# Patient Record
Sex: MALE | Race: BLACK OR AFRICAN AMERICAN | Employment: OTHER | URBAN - METROPOLITAN AREA
[De-identification: names, ages, dates, MRNs, and addresses within clinical notes are randomized per-mention and may not be internally consistent; named-entity substitution may affect disease eponyms.]

---

## 2020-04-27 ENCOUNTER — HOSPITAL ENCOUNTER (OUTPATIENT)
Age: 55
Setting detail: OBSERVATION
Discharge: HOME | End: 2020-05-01
Attending: EMERGENCY MEDICINE | Admitting: FAMILY MEDICINE
Payer: MEDICAID

## 2020-04-27 DIAGNOSIS — Z95.828 S/P INSERTION OF ILIAC ARTERY STENT: ICD-10-CM

## 2020-04-27 DIAGNOSIS — Z79.01 LONG TERM CURRENT USE OF ANTICOAGULANT: ICD-10-CM

## 2020-04-27 DIAGNOSIS — N18.3 ACUTE RENAL FAILURE SUPERIMPOSED ON STAGE 3 CHRONIC KIDNEY DISEASE, UNSPECIFIED ACUTE RENAL FAILURE TYPE: Primary | ICD-10-CM

## 2020-04-27 DIAGNOSIS — N17.9 ACUTE RENAL FAILURE SUPERIMPOSED ON STAGE 3 CHRONIC KIDNEY DISEASE, UNSPECIFIED ACUTE RENAL FAILURE TYPE: Primary | ICD-10-CM

## 2020-04-27 DIAGNOSIS — I10 ESSENTIAL HYPERTENSION: ICD-10-CM

## 2020-04-27 PROCEDURE — 85379 FIBRIN DEGRADATION QUANT: CPT

## 2020-04-27 PROCEDURE — 82550 ASSAY OF CK (CPK): CPT

## 2020-04-27 PROCEDURE — 93005 ELECTROCARDIOGRAM TRACING: CPT

## 2020-04-27 PROCEDURE — 80053 COMPREHEN METABOLIC PANEL: CPT

## 2020-04-27 PROCEDURE — 85025 COMPLETE CBC W/AUTO DIFF WBC: CPT

## 2020-04-27 PROCEDURE — 99284 EMERGENCY DEPT VISIT MOD MDM: CPT

## 2020-04-27 PROCEDURE — 85730 THROMBOPLASTIN TIME PARTIAL: CPT

## 2020-04-28 ENCOUNTER — APPOINTMENT (OUTPATIENT)
Dept: GENERAL RADIOLOGY | Age: 55
End: 2020-04-28
Attending: EMERGENCY MEDICINE
Payer: MEDICAID

## 2020-04-28 ENCOUNTER — APPOINTMENT (OUTPATIENT)
Dept: NUCLEAR MEDICINE | Age: 55
End: 2020-04-28
Attending: FAMILY MEDICINE
Payer: MEDICAID

## 2020-04-28 ENCOUNTER — APPOINTMENT (OUTPATIENT)
Dept: VASCULAR SURGERY | Age: 55
End: 2020-04-28
Attending: FAMILY MEDICINE
Payer: MEDICAID

## 2020-04-28 ENCOUNTER — APPOINTMENT (OUTPATIENT)
Dept: NON INVASIVE DIAGNOSTICS | Age: 55
End: 2020-04-28
Attending: FAMILY MEDICINE
Payer: MEDICAID

## 2020-04-28 PROBLEM — N18.9 ACUTE ON CHRONIC RENAL FAILURE (HCC): Status: ACTIVE | Noted: 2020-04-28

## 2020-04-28 PROBLEM — N17.9 ACUTE ON CHRONIC RENAL FAILURE (HCC): Status: ACTIVE | Noted: 2020-04-28

## 2020-04-28 LAB
ABDOMINAL PROX AORTA VEL: 77.8 CM/S
ALBUMIN SERPL-MCNC: 2.2 G/DL (ref 3.4–5)
ALBUMIN/GLOB SERPL: 0.5 {RATIO} (ref 0.8–1.7)
ALP SERPL-CCNC: 87 U/L (ref 45–117)
ALT SERPL-CCNC: 45 U/L (ref 16–61)
ANION GAP SERPL CALC-SCNC: 7 MMOL/L (ref 3–18)
ANION GAP SERPL CALC-SCNC: 8 MMOL/L (ref 3–18)
APPEARANCE UR: CLEAR
APTT PPP: 119 SEC (ref 23–36.4)
APTT PPP: 31.1 SEC (ref 23–36.4)
APTT PPP: 80.8 SEC (ref 23–36.4)
AST SERPL-CCNC: 32 U/L (ref 10–38)
ATRIAL RATE: 73 BPM
AV VELOCITY RATIO: 0.82
AV VTI RATIO: 0.9
BACTERIA URNS QL MICRO: ABNORMAL /HPF
BASOPHILS # BLD: 0 K/UL (ref 0–0.1)
BASOPHILS # BLD: 0 K/UL (ref 0–0.1)
BASOPHILS NFR BLD: 0 % (ref 0–2)
BASOPHILS NFR BLD: 1 % (ref 0–2)
BILIRUB SERPL-MCNC: 0.3 MG/DL (ref 0.2–1)
BILIRUB UR QL: NEGATIVE
BUN SERPL-MCNC: 56 MG/DL (ref 7–18)
BUN SERPL-MCNC: 56 MG/DL (ref 7–18)
BUN/CREAT SERPL: 13 (ref 12–20)
BUN/CREAT SERPL: 13 (ref 12–20)
CA-I SERPL-SCNC: 1.13 MMOL/L (ref 1.12–1.32)
CALCIUM SERPL-MCNC: 7.7 MG/DL (ref 8.5–10.1)
CALCIUM SERPL-MCNC: 8.2 MG/DL (ref 8.5–10.1)
CALCULATED P AXIS, ECG09: 68 DEGREES
CALCULATED R AXIS, ECG10: 49 DEGREES
CALCULATED T AXIS, ECG11: 104 DEGREES
CHLORIDE SERPL-SCNC: 112 MMOL/L (ref 100–111)
CHLORIDE SERPL-SCNC: 114 MMOL/L (ref 100–111)
CK MB CFR SERPL CALC: 0.7 % (ref 0–4)
CK MB CFR SERPL CALC: 0.9 % (ref 0–4)
CK MB CFR SERPL CALC: 1 % (ref 0–4)
CK MB CFR SERPL CALC: 1 % (ref 0–4)
CK MB SERPL-MCNC: 1.2 NG/ML (ref 5–25)
CK MB SERPL-MCNC: 1.3 NG/ML (ref 5–25)
CK MB SERPL-MCNC: 1.3 NG/ML (ref 5–25)
CK MB SERPL-MCNC: 1.5 NG/ML (ref 5–25)
CK SERPL-CCNC: 123 U/L (ref 39–308)
CK SERPL-CCNC: 150 U/L (ref 39–308)
CK SERPL-CCNC: 150 U/L (ref 39–308)
CK SERPL-CCNC: 177 U/L (ref 39–308)
CO2 SERPL-SCNC: 22 MMOL/L (ref 21–32)
CO2 SERPL-SCNC: 22 MMOL/L (ref 21–32)
COLOR UR: YELLOW
CREAT SERPL-MCNC: 4.16 MG/DL (ref 0.6–1.3)
CREAT SERPL-MCNC: 4.32 MG/DL (ref 0.6–1.3)
CREAT UR-MCNC: 49 MG/DL (ref 30–125)
CREAT UR-MCNC: 51 MG/DL (ref 30–125)
D DIMER PPP FEU-MCNC: 0.4 UG/ML(FEU)
DIAGNOSIS, 93000: NORMAL
DIFFERENTIAL METHOD BLD: ABNORMAL
DIFFERENTIAL METHOD BLD: ABNORMAL
ECHO AV AREA PEAK VELOCITY: 2.6 CM2
ECHO AV AREA VTI: 3 CM2
ECHO AV AREA/BSA PEAK VELOCITY: 1.4 CM2/M2
ECHO AV AREA/BSA VTI: 1.6 CM2/M2
ECHO AV MEAN GRADIENT: 2.7 MMHG
ECHO AV MEAN VELOCITY: 0.77 M/S
ECHO AV PEAK GRADIENT: 5.7 MMHG
ECHO AV PEAK VELOCITY: 119.86 CM/S
ECHO AV VTI: 24.1 CM
ECHO IVC PROX: 0.97 CM
ECHO LA AREA 2C: 14.96 CM2
ECHO LA AREA 4C: 17.7 CM2
ECHO LA MAJOR AXIS: 4.26 CM
ECHO LA VOL 2C: 37.88 ML (ref 18–58)
ECHO LA VOL 4C: 45.99 ML (ref 18–58)
ECHO LA VOL BP: 46.57 ML (ref 18–58)
ECHO LA VOLUME INDEX A2C: 20.78 ML/M2 (ref 16–28)
ECHO LA VOLUME INDEX A4C: 25.23 ML/M2 (ref 16–28)
ECHO LV E' LATERAL VELOCITY: 10 CM/S
ECHO LV E' SEPTAL VELOCITY: 9 CM/S
ECHO LV EDV A2C: 81 ML
ECHO LV EDV A4C: 118.4 ML
ECHO LV EDV BP: 98.5 ML (ref 67–155)
ECHO LV EDV INDEX A4C: 65 ML/M2
ECHO LV EDV INDEX BP: 54 ML/M2
ECHO LV EDV NDEX A2C: 44.4 ML/M2
ECHO LV EDV TEICHHOLZ: 0.67 ML
ECHO LV EJECTION FRACTION A2C: 54 %
ECHO LV EJECTION FRACTION A4C: 57 %
ECHO LV EJECTION FRACTION BIPLANE: 55 % (ref 55–100)
ECHO LV ESV A2C: 37.5 ML
ECHO LV ESV A4C: 51.3 ML
ECHO LV ESV BP: 44.5 ML (ref 22–58)
ECHO LV ESV INDEX A2C: 20.6 ML/M2
ECHO LV ESV INDEX A4C: 28.1 ML/M2
ECHO LV ESV INDEX BP: 24.4 ML/M2
ECHO LV ESV TEICHHOLZ: 0.33 ML
ECHO LV INTERNAL DIMENSION DIASTOLIC: 4.86 CM (ref 4.2–5.9)
ECHO LV INTERNAL DIMENSION SYSTOLIC: 3.58 CM
ECHO LV IVSD: 1.43 CM (ref 0.6–1)
ECHO LV MASS 2D: 241.6 G (ref 88–224)
ECHO LV MASS INDEX 2D: 132.5 G/M2 (ref 49–115)
ECHO LV POSTERIOR WALL DIASTOLIC: 0.82 CM (ref 0.6–1)
ECHO LV POSTERIOR WALL SYSTOLIC: 0 CM
ECHO LVOT DIAM: 2 CM
ECHO LVOT PEAK GRADIENT: 3.8 MMHG
ECHO LVOT PEAK VELOCITY: 97.96 CM/S
ECHO LVOT VTI: 22.71 CM
ECHO MV A VELOCITY: 68.23 CM/S
ECHO MV AREA PHT: 2.6 CM2
ECHO MV E DECELERATION TIME (DT): 286.8 MS
ECHO MV E VELOCITY: 97.34 CM/S
ECHO MV E/A RATIO: 1.43
ECHO MV E/E' LATERAL: 9.73
ECHO MV E/E' RATIO (AVERAGED): 10.27
ECHO MV E/E' SEPTAL: 10.82
ECHO MV PRESSURE HALF TIME (PHT): 83.2 MS
ECHO RA AREA 4C: 11.66 CM2
ECHO RA VOLUME: 24.9 ML
ECHO RV INTERNAL DIMENSION: 3.1 CM
ECHO TRICUSPID ANNULAR PEAK SYSTOLIC VELOCITY: 2.4 CM/S
EOSINOPHIL # BLD: 0.2 K/UL (ref 0–0.4)
EOSINOPHIL # BLD: 0.3 K/UL (ref 0–0.4)
EOSINOPHIL NFR BLD: 5 % (ref 0–5)
EOSINOPHIL NFR BLD: 5 % (ref 0–5)
EPITH CASTS URNS QL MICRO: ABNORMAL /LPF (ref 0–5)
ERYTHROCYTE [DISTWIDTH] IN BLOOD BY AUTOMATED COUNT: 14.7 % (ref 11.6–14.5)
ERYTHROCYTE [DISTWIDTH] IN BLOOD BY AUTOMATED COUNT: 14.7 % (ref 11.6–14.5)
GLOBULIN SER CALC-MCNC: 4.5 G/DL (ref 2–4)
GLUCOSE SERPL-MCNC: 110 MG/DL (ref 74–99)
GLUCOSE SERPL-MCNC: 93 MG/DL (ref 74–99)
GLUCOSE UR STRIP.AUTO-MCNC: NEGATIVE MG/DL
HCT VFR BLD AUTO: 35 % (ref 36–48)
HCT VFR BLD AUTO: 35.6 % (ref 36–48)
HGB BLD-MCNC: 11.2 G/DL (ref 13–16)
HGB BLD-MCNC: 11.6 G/DL (ref 13–16)
HGB UR QL STRIP: NEGATIVE
KETONES UR QL STRIP.AUTO: NEGATIVE MG/DL
LEFT CFA DIST SYS PSV: 135.2 CM/S
LEFT DIST ATA VELOCITY: 31 CM/S
LEFT DIST PTA PSV: 67.1 CM/S
LEFT KIDNEY LENGTH: 10.75 CM
LEFT KIDNEY WIDTH: 5.21 CM
LEFT PERONEAL MID SYS PSV: 28.5 CM/S
LEFT POP A PROX VEL RATIO: 0.91
LEFT POPLITEAL DIST SYS PSV: 56 CM/S
LEFT POPLITEAL PROX SYS PSV: 62.2 CM/S
LEFT PROX PFA A PSV: 76.9 CM/S
LEFT RENAL DIST DIAS: 13.5 CM/S
LEFT RENAL DIST RAR: 0.88
LEFT RENAL DIST RI: 0.8
LEFT RENAL DIST SYS: 68.4 CM/S
LEFT RENAL LOWER PARENCHYMA MAX: 18.3 CM/S
LEFT RENAL LOWER PARENCHYMA MIN: 4.1 CM/S
LEFT RENAL LOWER PARENCHYMA RI: 0.78
LEFT RENAL MID DIAS: 12.7 CM/S
LEFT RENAL MID RAR: 0.98
LEFT RENAL MID RI: 0.83
LEFT RENAL MID SYS: 76.5 CM/S
LEFT RENAL MIDDLE PARENCHYMA MAX: 20.2 CM/S
LEFT RENAL MIDDLE PARENCHYMA MIN: 4.1 CM/S
LEFT RENAL MIDDLE PARENCHYMA RI: 0.8
LEFT RENAL PROX DIAS: 15.2 CM/S
LEFT RENAL PROX RAR: 1.84
LEFT RENAL PROX RI: 0.89
LEFT RENAL PROX SYS: 142.9 CM/S
LEFT RENAL UPPER PARENCHYMA MAX: 19.7 CM/S
LEFT RENAL UPPER PARENCHYMA MIN: 5 CM/S
LEFT RENAL UPPER PARENCHYMA RI: 0.75
LEFT SFA DIST VEL RATIO: 0.85
LEFT SFA MID VEL RATIO: 0.94
LEFT SFA PROX VEL RATIO: 0.63
LEFT SUPER FEMORAL DIST SYS PSV: 68.3 CM/S
LEFT SUPER FEMORAL MID SYS PSV: 80.6 CM/S
LEFT SUPER FEMORAL PROX SYS PSV: 85.5 CM/S
LEUKOCYTE ESTERASE UR QL STRIP.AUTO: NEGATIVE
LVFS 2D: 26.31 %
LVOT MG: 2.06 MMHG
LVOT MV: 0.68 CM/S
LVSV (MOD BI): 29.13 ML
LVSV (MOD SINGLE 4C): 36.22 ML
LVSV (MOD SINGLE): 23.46 ML
LVSV (TEICH): 30.69 ML
LYMPHOCYTES # BLD: 2.1 K/UL (ref 0.9–3.6)
LYMPHOCYTES # BLD: 2.3 K/UL (ref 0.9–3.6)
LYMPHOCYTES NFR BLD: 38 % (ref 21–52)
LYMPHOCYTES NFR BLD: 40 % (ref 21–52)
MCH RBC QN AUTO: 23 PG (ref 24–34)
MCH RBC QN AUTO: 23.5 PG (ref 24–34)
MCHC RBC AUTO-ENTMCNC: 32 G/DL (ref 31–37)
MCHC RBC AUTO-ENTMCNC: 32.6 G/DL (ref 31–37)
MCV RBC AUTO: 71.9 FL (ref 74–97)
MCV RBC AUTO: 72.1 FL (ref 74–97)
MONOCYTES # BLD: 0.5 K/UL (ref 0.05–1.2)
MONOCYTES # BLD: 0.6 K/UL (ref 0.05–1.2)
MONOCYTES NFR BLD: 10 % (ref 3–10)
MONOCYTES NFR BLD: 11 % (ref 3–10)
MUCOUS THREADS URNS QL MICRO: ABNORMAL /LPF
MV DEC SLOPE: 3.39
NEUTS SEG # BLD: 2.2 K/UL (ref 1.8–8)
NEUTS SEG # BLD: 2.8 K/UL (ref 1.8–8)
NEUTS SEG NFR BLD: 43 % (ref 40–73)
NEUTS SEG NFR BLD: 47 % (ref 40–73)
NITRITE UR QL STRIP.AUTO: NEGATIVE
P-R INTERVAL, ECG05: 136 MS
PH UR STRIP: 6.5 [PH] (ref 5–8)
PLATELET # BLD AUTO: 175 K/UL (ref 135–420)
PLATELET # BLD AUTO: 222 K/UL (ref 135–420)
PMV BLD AUTO: 10.3 FL (ref 9.2–11.8)
PMV BLD AUTO: 10.9 FL (ref 9.2–11.8)
POTASSIUM SERPL-SCNC: 4.3 MMOL/L (ref 3.5–5.5)
POTASSIUM SERPL-SCNC: 4.8 MMOL/L (ref 3.5–5.5)
PROT SERPL-MCNC: 6.7 G/DL (ref 6.4–8.2)
PROT UR STRIP-MCNC: 300 MG/DL
PROT UR-MCNC: 244 MG/DL
PROT/CREAT UR-RTO: 5
Q-T INTERVAL, ECG07: 382 MS
QRS DURATION, ECG06: 82 MS
QTC CALCULATION (BEZET), ECG08: 420 MS
RBC # BLD AUTO: 4.87 M/UL (ref 4.7–5.5)
RBC # BLD AUTO: 4.94 M/UL (ref 4.7–5.5)
RBC #/AREA URNS HPF: ABNORMAL /HPF (ref 0–5)
RIGHT CFA DIST SYS PSV: 115.5 CM/S
RIGHT DIST PTA PSV: 63.3 CM/S
RIGHT KIDNEY LENGTH: 10.87 CM
RIGHT KIDNEY WIDTH: 5.29 CM
RIGHT PERONEAL DIST VELOCITY: 46.8 CM/S
RIGHT POP A PROX VEL RATIO: 0.99
RIGHT POPLITEAL DIST SYS PSV: 99.6 CM/S
RIGHT POPLITEAL PROX SYS PSV: 75.4 CM/S
RIGHT RENAL DIST DIAS: 11.3 CM/S
RIGHT RENAL DIST RAR: 0.87
RIGHT RENAL DIST RI: 0.83
RIGHT RENAL DIST SYS: 67.5 CM/S
RIGHT RENAL LOWER PARENCHYMA MAX: 22.4 CM/S
RIGHT RENAL LOWER PARENCHYMA MIN: 6.2 CM/S
RIGHT RENAL LOWER PARENCHYMA RI: 0.72
RIGHT RENAL MID DIAS: 10.4 CM/S
RIGHT RENAL MID RAR: 1.13
RIGHT RENAL MID RI: 0.88
RIGHT RENAL MID SYS: 87.9 CM/S
RIGHT RENAL MIDDLE PARENCHYMA MAX: 62.3 CM/S
RIGHT RENAL MIDDLE PARENCHYMA MIN: 11.2 CM/S
RIGHT RENAL MIDDLE PARENCHYMA RI: 0.82
RIGHT RENAL UPPER PARENCHYMA MAX: 32.6 CM/S
RIGHT RENAL UPPER PARENCHYMA MIN: 8.1 CM/S
RIGHT RENAL UPPER PARENCHYMA RI: 0.75
RIGHT SFA DIST VEL RATIO: 0.78
RIGHT SFA MID VEL RATIO: 0.7
RIGHT SFA PROX VEL RATIO: 1.3
RIGHT SUPER FEMORAL DIST SYS PSV: 76.5 CM/S
RIGHT SUPER FEMORAL MID SYS PSV: 98.2 CM/S
RIGHT SUPER FEMORAL PROX SYS PSV: 150.3 CM/S
SODIUM SERPL-SCNC: 141 MMOL/L (ref 136–145)
SODIUM SERPL-SCNC: 144 MMOL/L (ref 136–145)
SODIUM UR-SCNC: 92 MMOL/L (ref 20–110)
SP GR UR REFRACTOMETRY: 1.01 (ref 1–1.03)
STRESS BASELINE HR: 74 BPM
STRESS ESTIMATED WORKLOAD: 1 METS
STRESS EXERCISE DUR MIN: NORMAL
STRESS PEAK DIAS BP: 73 MMHG
STRESS PEAK SYS BP: 149 MMHG
STRESS PERCENT HR ACHIEVED: 56 %
STRESS POST PEAK HR: 93 BPM
STRESS RATE PRESSURE PRODUCT: NORMAL BPM*MMHG
STRESS ST DEPRESSION: 0 MM
STRESS ST ELEVATION: 0 MM
STRESS TARGET HR: 165 BPM
TROPONIN I SERPL-MCNC: <0.02 NG/ML (ref 0–0.04)
UROBILINOGEN UR QL STRIP.AUTO: 0.2 EU/DL (ref 0.2–1)
VENTRICULAR RATE, ECG03: 73 BPM
WBC # BLD AUTO: 5.1 K/UL (ref 4.6–13.2)
WBC # BLD AUTO: 6 K/UL (ref 4.6–13.2)
WBC URNS QL MICRO: ABNORMAL /HPF (ref 0–5)

## 2020-04-28 PROCEDURE — P9047 ALBUMIN (HUMAN), 25%, 50ML: HCPCS | Performed by: FAMILY MEDICINE

## 2020-04-28 PROCEDURE — 65660000000 HC RM CCU STEPDOWN

## 2020-04-28 PROCEDURE — 93925 LOWER EXTREMITY STUDY: CPT

## 2020-04-28 PROCEDURE — 82570 ASSAY OF URINE CREATININE: CPT

## 2020-04-28 PROCEDURE — 85730 THROMBOPLASTIN TIME PARTIAL: CPT

## 2020-04-28 PROCEDURE — 93970 EXTREMITY STUDY: CPT

## 2020-04-28 PROCEDURE — 74011250636 HC RX REV CODE- 250/636: Performed by: FAMILY MEDICINE

## 2020-04-28 PROCEDURE — 99218 HC RM OBSERVATION: CPT

## 2020-04-28 PROCEDURE — 74011250636 HC RX REV CODE- 250/636: Performed by: EMERGENCY MEDICINE

## 2020-04-28 PROCEDURE — 74011250637 HC RX REV CODE- 250/637: Performed by: FAMILY MEDICINE

## 2020-04-28 PROCEDURE — 96375 TX/PRO/DX INJ NEW DRUG ADDON: CPT

## 2020-04-28 PROCEDURE — A9500 TC99M SESTAMIBI: HCPCS

## 2020-04-28 PROCEDURE — 36415 COLL VENOUS BLD VENIPUNCTURE: CPT

## 2020-04-28 PROCEDURE — 93306 TTE W/DOPPLER COMPLETE: CPT

## 2020-04-28 PROCEDURE — 96374 THER/PROPH/DIAG INJ IV PUSH: CPT

## 2020-04-28 PROCEDURE — 93975 VASCULAR STUDY: CPT

## 2020-04-28 PROCEDURE — 81001 URINALYSIS AUTO W/SCOPE: CPT

## 2020-04-28 PROCEDURE — 84156 ASSAY OF PROTEIN URINE: CPT

## 2020-04-28 PROCEDURE — 84300 ASSAY OF URINE SODIUM: CPT

## 2020-04-28 PROCEDURE — 82330 ASSAY OF CALCIUM: CPT

## 2020-04-28 PROCEDURE — 93017 CV STRESS TEST TRACING ONLY: CPT

## 2020-04-28 PROCEDURE — 96376 TX/PRO/DX INJ SAME DRUG ADON: CPT

## 2020-04-28 PROCEDURE — 71045 X-RAY EXAM CHEST 1 VIEW: CPT

## 2020-04-28 RX ORDER — SODIUM CHLORIDE 0.9 % (FLUSH) 0.9 %
5-40 SYRINGE (ML) INJECTION AS NEEDED
Status: DISCONTINUED | OUTPATIENT
Start: 2020-04-28 | End: 2020-05-01 | Stop reason: HOSPADM

## 2020-04-28 RX ORDER — LOSARTAN POTASSIUM 50 MG/1
100 TABLET ORAL DAILY
Status: DISCONTINUED | OUTPATIENT
Start: 2020-04-28 | End: 2020-04-28

## 2020-04-28 RX ORDER — ONDANSETRON 2 MG/ML
4 INJECTION INTRAMUSCULAR; INTRAVENOUS
Status: DISCONTINUED | OUTPATIENT
Start: 2020-04-28 | End: 2020-05-01 | Stop reason: HOSPADM

## 2020-04-28 RX ORDER — ISOSORBIDE MONONITRATE 30 MG/1
30 TABLET, EXTENDED RELEASE ORAL DAILY
Status: DISCONTINUED | OUTPATIENT
Start: 2020-04-29 | End: 2020-05-01 | Stop reason: HOSPADM

## 2020-04-28 RX ORDER — HYDROCHLOROTHIAZIDE 25 MG/1
12.5 TABLET ORAL DAILY
Status: DISCONTINUED | OUTPATIENT
Start: 2020-04-28 | End: 2020-05-01 | Stop reason: HOSPADM

## 2020-04-28 RX ORDER — AMLODIPINE BESYLATE 5 MG/1
5 TABLET ORAL DAILY
Status: DISCONTINUED | OUTPATIENT
Start: 2020-04-28 | End: 2020-04-30

## 2020-04-28 RX ORDER — ISOSORBIDE MONONITRATE 30 MG/1
30 TABLET, EXTENDED RELEASE ORAL DAILY
Status: DISCONTINUED | OUTPATIENT
Start: 2020-04-29 | End: 2020-04-28

## 2020-04-28 RX ORDER — HEPARIN SODIUM 10000 [USP'U]/100ML
12-25 INJECTION, SOLUTION INTRAVENOUS
Status: DISCONTINUED | OUTPATIENT
Start: 2020-04-28 | End: 2020-04-29

## 2020-04-28 RX ORDER — LEVOTHYROXINE SODIUM 25 UG/1
25 TABLET ORAL
Status: DISCONTINUED | OUTPATIENT
Start: 2020-04-28 | End: 2020-05-01 | Stop reason: HOSPADM

## 2020-04-28 RX ORDER — ASPIRIN 325 MG
325 TABLET ORAL DAILY
Status: DISCONTINUED | OUTPATIENT
Start: 2020-04-28 | End: 2020-05-01 | Stop reason: HOSPADM

## 2020-04-28 RX ORDER — SODIUM CHLORIDE 9 MG/ML
50 INJECTION, SOLUTION INTRAVENOUS CONTINUOUS
Status: DISCONTINUED | OUTPATIENT
Start: 2020-04-28 | End: 2020-05-01

## 2020-04-28 RX ORDER — SODIUM CHLORIDE 0.9 % (FLUSH) 0.9 %
5-40 SYRINGE (ML) INJECTION AS NEEDED
Status: CANCELLED | OUTPATIENT
Start: 2020-04-28

## 2020-04-28 RX ORDER — SODIUM CHLORIDE 0.9 % (FLUSH) 0.9 %
5-40 SYRINGE (ML) INJECTION EVERY 8 HOURS
Status: CANCELLED | OUTPATIENT
Start: 2020-04-28

## 2020-04-28 RX ORDER — HEPARIN SODIUM 1000 [USP'U]/ML
4000 INJECTION, SOLUTION INTRAVENOUS; SUBCUTANEOUS ONCE
Status: COMPLETED | OUTPATIENT
Start: 2020-04-28 | End: 2020-04-28

## 2020-04-28 RX ORDER — CLOPIDOGREL BISULFATE 75 MG/1
75 TABLET ORAL DAILY
Status: DISCONTINUED | OUTPATIENT
Start: 2020-04-28 | End: 2020-05-01 | Stop reason: HOSPADM

## 2020-04-28 RX ORDER — ALBUMIN HUMAN 250 G/1000ML
25 SOLUTION INTRAVENOUS EVERY 6 HOURS
Status: DISCONTINUED | OUTPATIENT
Start: 2020-04-28 | End: 2020-04-30

## 2020-04-28 RX ORDER — CARVEDILOL 12.5 MG/1
25 TABLET ORAL 2 TIMES DAILY WITH MEALS
Status: DISCONTINUED | OUTPATIENT
Start: 2020-04-28 | End: 2020-05-01 | Stop reason: HOSPADM

## 2020-04-28 RX ORDER — SODIUM CHLORIDE 0.9 % (FLUSH) 0.9 %
5-40 SYRINGE (ML) INJECTION EVERY 8 HOURS
Status: DISCONTINUED | OUTPATIENT
Start: 2020-04-28 | End: 2020-05-01 | Stop reason: HOSPADM

## 2020-04-28 RX ORDER — PRAVASTATIN SODIUM 20 MG/1
20 TABLET ORAL
Status: DISCONTINUED | OUTPATIENT
Start: 2020-04-28 | End: 2020-05-01 | Stop reason: HOSPADM

## 2020-04-28 RX ADMIN — Medication 10 ML: at 06:57

## 2020-04-28 RX ADMIN — CARVEDILOL 25 MG: 12.5 TABLET, FILM COATED ORAL at 17:24

## 2020-04-28 RX ADMIN — CLOPIDOGREL BISULFATE 75 MG: 75 TABLET ORAL at 12:10

## 2020-04-28 RX ADMIN — HEPARIN SODIUM 4000 UNITS: 1000 INJECTION INTRAVENOUS; SUBCUTANEOUS at 02:27

## 2020-04-28 RX ADMIN — ALBUMIN (HUMAN) 25 G: 0.25 INJECTION, SOLUTION INTRAVENOUS at 17:24

## 2020-04-28 RX ADMIN — ALBUMIN (HUMAN) 25 G: 0.25 INJECTION, SOLUTION INTRAVENOUS at 06:11

## 2020-04-28 RX ADMIN — PRAVASTATIN SODIUM 20 MG: 20 TABLET ORAL at 21:55

## 2020-04-28 RX ADMIN — CARVEDILOL 25 MG: 12.5 TABLET, FILM COATED ORAL at 12:10

## 2020-04-28 RX ADMIN — HEPARIN SODIUM 12 UNITS/KG/HR: 10000 INJECTION, SOLUTION INTRAVENOUS at 02:26

## 2020-04-28 RX ADMIN — Medication 10 ML: at 14:00

## 2020-04-28 RX ADMIN — ALBUMIN (HUMAN) 25 G: 0.25 INJECTION, SOLUTION INTRAVENOUS at 12:12

## 2020-04-28 RX ADMIN — Medication 10 ML: at 21:56

## 2020-04-28 RX ADMIN — REGADENOSON 0.4 MG: 0.08 INJECTION, SOLUTION INTRAVENOUS at 09:45

## 2020-04-28 RX ADMIN — ASPIRIN 325 MG ORAL TABLET 325 MG: 325 PILL ORAL at 12:10

## 2020-04-28 RX ADMIN — HYDROCHLOROTHIAZIDE 12.5 MG: 25 TABLET ORAL at 12:11

## 2020-04-28 RX ADMIN — SODIUM CHLORIDE 100 ML/HR: 900 INJECTION, SOLUTION INTRAVENOUS at 03:12

## 2020-04-28 RX ADMIN — LOSARTAN POTASSIUM 100 MG: 50 TABLET, FILM COATED ORAL at 12:10

## 2020-04-28 RX ADMIN — Medication 10 ML: at 03:11

## 2020-04-28 RX ADMIN — AMLODIPINE BESYLATE 5 MG: 5 TABLET ORAL at 12:10

## 2020-04-28 NOTE — ED NOTES
RN in to assess pt. Repositioned for comfort. Additional needs assessed. No apparent distress. No additional needs expressed at this time. Resting comfortably, awaiting further orders/procedures. Pt aware PTT lab added on to blood already in the lab, Heparin to be initiated then pt to be transferred to floor. Understanding verbalized.

## 2020-04-28 NOTE — ED NOTES
TRANSFER - OUT REPORT:    Verbal report given to Heather Almendarez RN (name) on [address]  being transferred to Tele (unit) for routine progression of care       Report consisted of patients Situation, Background, Assessment and   Recommendations(SBAR). Information from the following report(s) SBAR, ED Summary and MAR was reviewed with the receiving nurse. Lines:   Peripheral IV 04/27/20 Right Antecubital (Active)   Site Assessment Clean, dry, & intact 4/27/2020 10:59 PM   Phlebitis Assessment 0 4/27/2020 10:59 PM   Infiltration Assessment 0 4/27/2020 10:59 PM   Dressing Status Clean, dry, & intact 4/27/2020 10:59 PM   Dressing Type Transparent 4/27/2020 10:59 PM   Hub Color/Line Status Pink 4/27/2020 10:59 PM        Opportunity for questions and clarification was provided. Patient transported with:   Monitor  Registered Nurse     Pt to be taken to floor after Heparin bolus given and infusion initiated at 12 units/kg/hr into right AC without difficulty. AOx4. Pain free.

## 2020-04-28 NOTE — PROGRESS NOTES
Cardiology Progress Note        Patient: Polo Palmer        Sex: male          DOA: 4/27/2020  YOB: 1965      Age:  53 y.o.        LOS:  LOS: 0 days   Assessment/Plan    patient was seen and examined. Complete consult note to follow.  THX    Signed By: Cristiane Woodward MD     April 28, 2020

## 2020-04-28 NOTE — CONSULTS
Nephrology Consult    Patient: Alyse Orourke  Requesting physician: Dr. Margarita Hardin  Reason for consult: Renal Failure        History of Present Illness:  Alyse Orourke is a 53 y.o. male with a past medical history significant for HTN, CAD, CVA, Chronic Hepatitis C, CKD who presented to the ED with c/o Palpitation, Dyspnea on exertion and Right Calf pain/swelling. Patient admitted and started on IV Heparin. Labs on admission revealed a S Cr 4.3 and repeat today 4. 16. Available labs from [place] records reveal S Cr of 2.7 on 2/11/20, 2.39 back on 8/13/19  Work up thus far:  No evidence of DVT on Duplex study of lower extremities. Arterial duplex study showed no evidence of significant peripheral arterial occlusive disease in both lower extremities. Nuclear Stress test Abnormal. ECHO EF 55%. Nephrology was consulted for further evaluation and management of Renal Failure. Patient denies use of NSAIDs or herbal supplements. No h/o Kidney stones, Prostatic disease or Kidney infections.      Past Medical History:  Patient Active Problem List   Diagnosis Code    CAD (coronary artery disease), native coronary artery I25.10    PVD (peripheral vascular disease) with claudication ([facility]) I73.9    HBP (high blood pressure) I10    Chest pain, unspecified R07.9    High cholesterol E78.00    H/O: CVA (cerebrovascular accident) Z80.78    H/O acute myocardial infarction I25.2    S/P angioplasty with stent Z95.820    S/P insertion of iliac artery stent Z95.828    Peripheral vascular disease ([facility] [address] I73.9    Hypertension I10    Acute on chronic renal failure (HCC) N17.9, N18.9         Social History:  Social History     Socioeconomic History    Marital status: SINGLE     Spouse name: Not on file    Number of children: Not on file    Years of education: Not on file    Highest education level: Not on file   Occupational History    Not on file   Social Needs    Financial resource strain: Not on file    Food insecurity Worry: Not on file     Inability: Not on file    Transportation needs     Medical: Not on file     Non-medical: Not on file   Tobacco Use    Smoking status: Former Smoker    Smokeless tobacco: Never Used   Substance and Sexual Activity    Alcohol use: Yes     Comment: daily    Drug use: Not on file    Sexual activity: Not on file   Lifestyle    Physical activity     Days per week: Not on file     Minutes per session: Not on file    Stress: Not on file   Relationships    Social connections     Talks on phone: Not on file     Gets together: Not on file     Attends Sikhism service: Not on file     Active member of club or organization: Not on file     Attends meetings of clubs or organizations: Not on file     Relationship status: Not on file    Intimate partner violence     Fear of current or ex partner: Not on file     Emotionally abused: Not on file     Physically abused: Not on file     Forced sexual activity: Not on file   Other Topics Concern    Not on file   Social History Narrative    Not on file       Family History:  Family History   Problem Relation Age of Onset    Heart Attack Father        Allergy:  No Known Allergies     Medication:  Home meds: reviewed    Inpatient meds:  Current Facility-Administered Medications   Medication Dose Route Frequency    amLODIPine (NORVASC) tablet 5 mg  5 mg Oral DAILY    aspirin tablet 325 mg  325 mg Oral DAILY    carvediloL (COREG) tablet 25 mg  25 mg Oral BID WITH MEALS    clopidogreL (PLAVIX) tablet 75 mg  75 mg Oral DAILY    hydroCHLOROthiazide (HYDRODIURIL) tablet 12.5 mg  12.5 mg Oral DAILY    levothyroxine (SYNTHROID) tablet 25 mcg  25 mcg Oral ACB    losartan (COZAAR) tablet 100 mg  100 mg Oral DAILY    pravastatin (PRAVACHOL) tablet 20 mg  20 mg Oral QHS    sodium chloride (NS) flush 5-40 mL  5-40 mL IntraVENous Q8H    sodium chloride (NS) flush 5-40 mL  5-40 mL IntraVENous PRN    ondansetron (ZOFRAN) injection 4 mg  4 mg IntraVENous Q4H PRN    heparin 25,000 units in D5W 250 ml infusion  12-25 Units/kg/hr IntraVENous TITRATE    albumin human 25% (BUMINATE) solution 25 g  25 g IntraVENous Q6H    0.9% sodium chloride infusion  100 mL/hr IntraVENous CONTINUOUS                        Review of Systems:  Gen: no fever, chills or fatigue  Head: No headache or trauma  Eyes: No change in vision  Throat/Neck: no sore throat or dysphagia  CV: No chest pain, + palpitation, + VENEGAS on admission  Pulm: no cough or hemoptysis  GI: no nausea, vomiting or diarrhea  : no dysuria urgency , hesitancy or hematuria  Skin: no new rash or lesions  Endocrine: no heatt or cold intolerance  Psych: no anxiety or depression    Vital signs:   Visit Vitals  /74 (BP 1 Location: Right arm, BP Patient Position: At rest;Supine)   Pulse 73   Temp 97.9 °F (36.6 °C)   Resp 14   Ht 5' 5\" (1.651 m)   Wt 74.8 kg (165 lb)   SpO2 100%   BMI 27.46 kg/m²         Intake/Output Summary (Last 24 hours) at 4/28/2020 1442  Last data filed at 4/28/2020 1212  Gross per 24 hour   Intake 240 ml   Output    Net 240 ml       Physical Exam:    General: No acute distress   HENT: Atraumatic and normocephalic   Eyes: Normal conjunctiva   Neck: Supple, NoJVD    Cardiovascular: Normal S1 & S2, no m/r/g   Pulmonary/Chest Wall: Clear to auscultation bilaterally   Abdominal: Soft and non-tender,No,palpable mass,  No bruit   Musculoskeletal: No  edema   Neurological: No focal deficits       Data Review:  Recent Labs     04/28/20  0553 04/27/20  2230    141   K 4.3 4.8   * 112*   CO2 22 22   BUN 56* 56*   CREA 4.16* 4.32*   GLU 93 110*   CA 8.2* 7.7*     Recent Labs     04/28/20  0553 04/27/20  2230   WBC 6.0 5.1   HGB 11.2* 11.6*   HCT 35.0* 35.6*    222     Recent Labs     04/28/20  0553   SGOT 32   AP 87   TP 6.7   ALB 2.2*   GLOB 4.5*     Recent Labs     04/28/20  0553 04/27/20 2230   APTT 119.0* 31.1      No results for input(s): IRON, FE, TIBC, IBCT, PSAT, FERR in the last 72 hours. Urinalysis  No results found for: UGLU       CXR:        The lungs and pleural spaces are clear. The mediastinum is unremarkable in  appearance. No significant osseous abnormalities are identified.       Kidney ultrasound:    Renal Artery Duplex study  No significant stenosis in thebilateral renal artery. Bilateral kidney appears to be echogenic. Elevated resistive index noted at the bilateral kidney poles and is greater than 0.70. Patent renal veins with normal hemodynamics.           Impression:     1. Acute Kidney Injury on CKD vs Progression of CKD. Possibly has some Prerenal component. Patient likely has underlying CKD sec to Hypertensive Nephrosclerosis. Possible Chronic glomerular disease sec to chronic Hep C.  2. HTN  3. CAD s/p PTCA  4. PAD  5. Anemia likely sec to CKD  6. Chronic Hep C  7. Hypoalbuminemia    Plan:     1. Continue IV Fluid  2. Hold ARB Cozaar for now. Continue Amlodipine and Carvedilol  3. Urinalysis and Urine Protein/Cr ratio  4. Monitor Serum chemistry  5. Monitor I/O's  6. Check Complements C3, C4, CH50,  LIANA, Rheumatoid Factor, SIFE  7. Check PTH, Phos  8. Recommend evaluation and treatment of Chronic Hep C  By Hepatologist  9. Recommend Nephrology Follow up after discharge. 10. Cardiac work up in progress. Thank you for the consultation.     Apple Jeong MD,  [facility]  [phone]

## 2020-04-28 NOTE — ED PROVIDER NOTES
EMERGENCY DEPARTMENT HISTORY AND PHYSICAL EXAM    Date: 4/27/2020  Patient Name: Amanda Mendez    History of Presenting Illness     Chief Complaint   Patient presents with    Palpitations    Leg Swelling         History Provided By: Patient    Additional History (Context):     11:50 PM    Amanda Mendez is a 53 y.o. male with pertinent PMHx of CAD, HTN, HLD, PVD, MI (stents), and stroke presenting ambulatory to the ED c/o 7/10 aching right calf pain and swelling x 1-2 weeks. Pt states that the swelling has been worsening. Pt notes associated symptoms of SOB and palpitations with ambulation/exertion; but denies CP, N/V, urinary sxs, or new cough. Pt is on ASA and Plavix. Pt notes a FHx of HTN, MI, and stroke (mother). Pt denies any personal FHx of CKD, despite record review showing such. PCP: Fouzia Chandler MD   Pt does not smoke tobacco (former, quit a few days ago), drink EtOH excessively, or do illicit drugs. There are no other complaints, changes, or physical findings at this time.      Current Facility-Administered Medications   Medication Dose Route Frequency Provider Last Rate Last Dose    amLODIPine (NORVASC) tablet 5 mg  5 mg Oral DAILY Noah Ash MD        aspirin tablet 325 mg  325 mg Oral DAILY Noah Ash MD        carvediloL (COREG) tablet 25 mg  25 mg Oral BID WITH MEALS Noah Ash MD        clopidogreL (PLAVIX) tablet 75 mg  75 mg Oral DAILY Noah Ash MD        hydroCHLOROthiazide (HYDRODIURIL) tablet 12.5 mg  12.5 mg Oral DAILY Noah Ash MD        levothyroxine (SYNTHROID) tablet 25 mcg  25 mcg Oral ACB Noah Ash MD        losartan (COZAAR) tablet 100 mg  100 mg Oral DAILY Noah Ash MD        pravastatin (PRAVACHOL) tablet 20 mg  20 mg Oral QHS Noah Ash MD        sodium chloride (NS) flush 5-40 mL  5-40 mL IntraVENous Q8H Noah Ash MD        sodium chloride (NS) flush 5-40 mL  5-40 mL IntraVENous PRN Viraj Diaz MD        ondansetron [facility]) injection 4 mg  4 mg IntraVENous Q4H PRN Viraj Diaz MD         Current Outpatient Medications   Medication Sig Dispense Refill    amLODIPine (NORVASC) 5 mg tablet Take 5 mg by mouth daily.  carvedilol (COREG) 25 mg tablet Take 25 mg by mouth two (2) times daily (with meals).  aspirin (ASPIRIN) 325 mg tablet Take 325 mg by mouth daily.  Hydrochlorothiazide (HYDRODIURIL) 12.5 mg tablet Take 12.5 mg by mouth daily.  pravastatin (PRAVACHOL) 20 mg tablet Take 20 mg by mouth nightly.  clopidogrel (PLAVIX) 75 mg tablet Take 1 Tab by mouth daily. 90 Tab 3    nitroglycerin (NITROSTAT) 0.4 mg SL tablet 1 Tab by SubLINGual route every five (5) minutes as needed for Chest Pain. 25 Tab 4    losartan (COZAAR) 100 mg tablet Take 100 mg by mouth daily.  NIFEdipine ER (ADALAT CC) 30 mg ER tablet Take 30 mg by mouth daily.  levothyroxine (SYNTHROID) 25 mcg tablet Take 25 mcg by mouth Daily (before breakfast).            Past History     Past Medical History:  Past Medical History:   Diagnosis Date    CAD (coronary artery disease)     Essential hypertension     Hypercholesterolemia     Hyperlipidemia     Hypertension     Myocardial infarct ([address]     Peripheral vascular disease (HCC)     s/p left iliac stenting x 3    Psychiatric disorder     depression    Stroke [facility])        Past Surgical History:  Past Surgical History:   Procedure Laterality Date    CARDIAC SURG PROCEDURE UNLIST      HX CORONARY STENT PLACEMENT      2 STENTS 2012    HX HEART CATHETERIZATION      HX PTCA         Family History:  Family History   Problem Relation Age of Onset    Heart Attack Father        Social History:  Social History     Tobacco Use    Smoking status: Former Smoker    Smokeless tobacco: Never Used   Substance Use Topics    Alcohol use: Yes     Comment: daily    Drug use: Not on file       Allergies:  No Known Allergies      Review of Systems   Review of Systems   Respiratory: Positive for shortness of breath. Negative for cough. Cardiovascular: Positive for palpitations and leg swelling (right leg). Negative for chest pain. Gastrointestinal: Negative for nausea and vomiting. Musculoskeletal: Positive for myalgias (right leg). All other systems reviewed and are negative. Physical Exam     Vitals:    04/27/20 2213 04/27/20 2300 04/28/20 0030 04/28/20 0031   BP: 148/84 117/78 115/72    Pulse: 73 71     Resp: 18 17     Temp: 97 °F (36.1 °C)      SpO2: 98% 100%  100%   Weight: 74.8 kg (165 lb)      Height: 5' 5\" (1.651 m)        Physical Exam  Vitals signs and nursing note reviewed. Constitutional:       General: He is not in acute distress. Appearance: He is well-developed. He is not diaphoretic. HENT:      Head: Normocephalic and atraumatic. Right Ear: External ear normal.      Left Ear: External ear normal.      Mouth/Throat:      Pharynx: No oropharyngeal exudate. Eyes:      General: No scleral icterus. Conjunctiva/sclera: Conjunctivae normal.      Pupils: Pupils are equal, round, and reactive to light. Comments: No pallor   Neck:      Musculoskeletal: Normal range of motion and neck supple. Thyroid: No thyromegaly. Vascular: No JVD. Trachea: No tracheal deviation. Cardiovascular:      Rate and Rhythm: Normal rate and regular rhythm. Pulses: Normal pulses. Heart sounds: Normal heart sounds. No murmur. Comments: Distal pulses of bilateral feet 2+ to both DP and PT pulses  Pulmonary:      Effort: Pulmonary effort is normal. No respiratory distress. Breath sounds: Normal breath sounds. No stridor. Abdominal:      General: Bowel sounds are normal. There is no distension. Palpations: Abdomen is soft. Tenderness: There is no abdominal tenderness. There is no guarding or rebound. Musculoskeletal: Normal range of motion. General: No tenderness. Right lower leg: Edema present. Left lower leg: Edema present. Comments: No edema to upper extremities  There is edema to bilateral lower extremities. With asymmetry of right> left; positive pitting   Lymphadenopathy:      Cervical: No cervical adenopathy. Skin:     General: Skin is warm and dry. Findings: No erythema or rash. Neurological:      Mental Status: He is alert and oriented to person, place, and time. Cranial Nerves: No cranial nerve deficit. Coordination: Coordination normal.      Deep Tendon Reflexes: Reflexes are normal and symmetric. Reflexes normal.   Psychiatric:         Behavior: Behavior normal.         Thought Content:  Thought content normal.         Judgment: Judgment normal.         Diagnostic Study Results     Labs -     Recent Results (from the past 12 hour(s))   EKG, 12 LEAD, INITIAL    Collection Time: 04/27/20 10:24 PM   Result Value Ref Range    Ventricular Rate 73 BPM    Atrial Rate 73 BPM    P-R Interval 136 ms    QRS Duration 82 ms    Q-T Interval 382 ms    QTC Calculation (Bezet) 420 ms    Calculated P Axis 68 degrees    Calculated R Axis 49 degrees    Calculated T Axis 104 degrees    Diagnosis       Normal sinus rhythm  Nonspecific T wave abnormality  Abnormal ECG  When compared with ECG of 25-FEB-2013 09:34,  Minimal criteria for Inferior infarct are no longer present  Nonspecific T wave abnormality has replaced inverted T waves in Inferior   leads     CBC WITH AUTOMATED DIFF    Collection Time: 04/27/20 10:30 PM   Result Value Ref Range    WBC 5.1 4.6 - 13.2 K/uL    RBC 4.94 4.70 - 5.50 M/uL    HGB 11.6 (L) 13.0 - 16.0 g/dL    HCT 35.6 (L) 36.0 - 48.0 %    MCV 72.1 (L) 74.0 - 97.0 FL    MCH 23.5 (L) 24.0 - 34.0 PG    MCHC 32.6 31.0 - 37.0 g/dL    RDW 14.7 (H) 11.6 - 14.5 %    PLATELET 056 706 - 665 K/uL    MPV 10.9 9.2 - 11.8 FL    NEUTROPHILS 43 40 - 73 %    LYMPHOCYTES 40 21 - 52 %    MONOCYTES 11 (H) 3 - 10 % EOSINOPHILS 5 0 - 5 %    BASOPHILS 1 0 - 2 %    ABS. NEUTROPHILS 2.2 1.8 - 8.0 K/UL    ABS. LYMPHOCYTES 2.1 0.9 - 3.6 K/UL    ABS. MONOCYTES 0.5 0.05 - 1.2 K/UL    ABS. EOSINOPHILS 0.2 0.0 - 0.4 K/UL    ABS. BASOPHILS 0.0 0.0 - 0.1 K/UL    DF AUTOMATED     METABOLIC PANEL, BASIC    Collection Time: 04/27/20 10:30 PM   Result Value Ref Range    Sodium 141 136 - 145 mmol/L    Potassium 4.8 3.5 - 5.5 mmol/L    Chloride 112 (H) 100 - 111 mmol/L    CO2 22 21 - 32 mmol/L    Anion gap 7 3.0 - 18 mmol/L    Glucose 110 (H) 74 - 99 mg/dL    BUN 56 (H) 7.0 - 18 MG/DL    Creatinine 4.32 (H) 0.6 - 1.3 MG/DL    BUN/Creatinine ratio 13 12 - 20      GFR est AA 17 (L) >60 ml/min/1.73m2    GFR est non-AA 14 (L) >60 ml/min/1.73m2    Calcium 7.7 (L) 8.5 - 10.1 MG/DL   D DIMER    Collection Time: 04/27/20 10:30 PM   Result Value Ref Range    D DIMER 0.40 <0.46 ug/ml(FEU)   CARDIAC PANEL,(CK, CKMB & TROPONIN)    Collection Time: 04/27/20 11:30 PM   Result Value Ref Range     39 - 308 U/L    CK - MB 1.3 <3.6 ng/ml    CK-MB Index 0.7 0.0 - 4.0 %    Troponin-I, QT <0.02 0.0 - 0.045 NG/ML       Radiologic Studies -   XR CHEST PORT   Final Result   Impression:   --------------      No active cardiopulmonary disease. CT Results  (Last 48 hours)    None        CXR Results  (Last 48 hours)               04/28/20 0019  XR CHEST PORT Final result    Impression:  Impression:   --------------       No active cardiopulmonary disease. Narrative:  ---------------------------------------------------------------------------   <<<<<<<<<           [facility]           >>>>>>>>>    ---------------------------------------------------------------------------       CLINICAL HISTORY:  Shortness of breath. COMPARISON EXAMINATIONS:  May 14, 2012. ---  SINGLE FRONTAL VIEW OF THE CHEST  ---       The lungs and pleural spaces are clear. The mediastinum is unremarkable in   appearance.   No significant osseous abnormalities are identified.             --------------               Medical Decision Making   I am the first provider for this patient. I reviewed the vital signs, available nursing notes, past medical history, past surgical history, family history and social history. Vital Signs-Reviewed the patient's vital signs. Pulse Oximetry Analysis - 100% on RA     EKG interpretation: (Preliminary)  NSR at 73 bpm. T-wave inversion in I, II, aVL, V4, V5, and V6. Normal ST segments. EKG read by Lucia Mead. Naty Millan MD at [phone]. Records Reviewed: Nursing Notes, Old Medical Records and Previous Laboratory Studies    Provider Notes (Medical Decision Making): Edema, more likely CHF or renal. Possible DVTs. Will run tests, D-dimer to assess for clot, cardiac panels for ACS, DVT, or PE. Perhaps PVLs if feasible and disposition pending results. Old records show CKD3. PROCEDURES:  Procedures    MEDICATIONS GIVEN IN THE ED:  Medications   amLODIPine (NORVASC) tablet 5 mg (has no administration in time range)   aspirin tablet 325 mg (has no administration in time range)   carvediloL (COREG) tablet 25 mg (has no administration in time range)   clopidogreL (PLAVIX) tablet 75 mg (has no administration in time range)   hydroCHLOROthiazide (HYDRODIURIL) tablet 12.5 mg (has no administration in time range)   levothyroxine (SYNTHROID) tablet 25 mcg (has no administration in time range)   losartan (COZAAR) tablet 100 mg (has no administration in time range)   pravastatin (PRAVACHOL) tablet 20 mg (has no administration in time range)   sodium chloride (NS) flush 5-40 mL (has no administration in time range)   sodium chloride (NS) flush 5-40 mL (has no administration in time range)   ondansetron (ZOFRAN) injection 4 mg (has no administration in time range)        ED COURSE:   11:50 PM   Initial assessment performed. PROGRESS NOTE:  12:49 AM  Pt and/or family have been updated on their results.  Pt and/or pt's family are aware of the plan of care and are in agreement. Written by Car Bean, ED Scribe, as dictated by Salvador Capellan MD.      CONSULT NOTE:   1:03 AM  Jimena Capellan MD spoke with Raphael Magaña MD,  Specialty: Hospitalist  Discussed pt's hx, disposition, and available diagnostic and imaging results. Reviewed care plans. Consultant will admit the pt to [address] [place] [address]. Written by Nick Lazaro, ED Scribe, as dictated by Salvador Capellan MD.      Diagnosis and Disposition     Critical Care Time: 1:09 AM  I have spent 60 minutes of critical care time involved in lab review, consultations with specialist, family decision-making, and documentation. During this entire length of time I was immediately available to the patient. Critical Care: The reason for providing this level of medical care for this critically ill patient was due a critical illness that impaired one or more vital organ systems such that there was a high probability of imminent or life threatening deterioration in the patients condition. This care involved high complexity decision making to assess, manipulate, and support vital system functions, to treat this degree vital organ system failure and to prevent further life threatening deterioration of the patients condition. Core Measures:  For Hospitalized Patients:    1. Hospitalization Decision Time:  The decision to hospitalize the patient was made by Jimena Capellan MD at 1:09 AM on 4/27/2020.    2. Aspirin: Aspirin was not given because the patient did not present with a stroke at the time of their Emergency Department evaluation     1:06 AM  Patient is being admitted to the hospital by Raphael Magaña MD. The results of their tests and reasons for their admission have been discussed with them and/or available family. They convey agreement and understanding for the need to be admitted and for their admission diagnosis.       CONDITIONS ON ADMISSION:  Sepsis is not present at the time of admission. Deep Vein Thrombosis is not present at the time of admission. Thrombosis is not present at the time of admission. Urinary Tract Infection is not present at the time of admission. Pneumonia is not present at the time of admission. MRSA is not present at the time of admission. Wound infection is not present at the time of admission. Pressure Ulcer is not present at the time of admission. CLINICAL IMPRESSION:  1. Acute renal failure superimposed on stage 3 chronic kidney disease, unspecified acute renal failure type ([address]    2. Essential hypertension    3. Long term current use of anticoagulant    4. S/P insertion of iliac artery stent        PLAN:  1. ADMIT TO TELE  _______________________________    Attestations: This note is prepared by Francesca Ribeiro, acting as Scribe for Pritesh. Isa Lemons MD.    Pritesh. Isa Lemons MD:  The scribe's documentation has been prepared under my direction and personally reviewed by me in its entirety.  I confirm that the note above accurately reflects all work, treatment, procedures, and medical decision making performed by me.  _______________________________

## 2020-04-28 NOTE — ED NOTES
Hospitalist in department to admit pt    RN in to assess pt. Repositioned for comfort. Additional needs assessed. No apparent distress. No additional needs expressed at this time. Resting comfortably, awaiting further orders/procedures.

## 2020-04-28 NOTE — ROUTINE PROCESS
Bedside shift change report given to [address] [place] [address] (oncoming nurse) by Emeli Taylor RN (offgoing nurse). Report included the following information SBAR, Kardex, Intake/Output and MAR.

## 2020-04-28 NOTE — ROUTINE PROCESS
0700: Assumed are of pt from [address]. 
0830: pt off floor to nuc med. 1032: Pt came back to floor. Seen by . 1210: Pt was assessed and given medication lunch ordered. 1400: pt resting no sign of distress. Call light within reach. 1615: Asia Christianson RN collected pt urine and took specimen down to lab. Pt resting no sign of distress. Call light within reach.

## 2020-04-28 NOTE — H&P
History & Physical    Patient: Emily Ritter MRN: [id number]  CSN: [id number]    YOB: 1965  Age: 53 y.o. Sex: male      DOA: 4/27/2020  Primary Care Provider:  Sharon Oliveira MD      Assessment/Plan     Patient Active Problem List   Diagnosis Code    CAD (coronary artery disease), native coronary artery I25.10    PVD (peripheral vascular disease) with claudication ([facility]) I73.9    HBP (high blood pressure) I10    Chest pain, unspecified R07.9    High cholesterol E78.00    H/O: CVA (cerebrovascular accident) Z80.78    H/O acute myocardial infarction I25.2    S/P angioplasty with stent Z95.820    S/P insertion of iliac artery stent Z95.828    Peripheral vascular disease ([facility] [address] I73.9    Hypertension I10    Acute on chronic renal failure (HCC) N17.9, N18.9     54 y/o male with hx of CAD and PAD s/p angioplasty admitted for b/l lower extremity swelling and dyspnea on exertion. He also has BRIANNA on CKD. Given his vascular history, I will get a renal U/S STAT to rule out a clot and ensure blood flow (this could explain his swelling and dyspnea). He also needs an echo to potentially evaluate for CHF.     Dyspnea on exertion  -telemetry  -trend troponins  -echo in AM  -cardiology consult  -nuc med stress test if troponins neg    Lower extremity swelling  -b/l duplex venous and arterial ultrasounds in AM  -vascular surg consult  -heparin gtt in case of thrombus     BRIANNA  -renal U/S  -nephrology consult  -albumin q6 hrs    Full code    Prophylaxis: SCDs, protonix IV, heparin SQ    Estimated length of stay : 3 nights    Miguel Regalado MD  Nocturnist  ------------------------------------------------------------------------------------------------------------------------------------------------------------------------------------------------------------------------------------------------------  CC: dyspnea, leg swelling       HPI:     Emily Ritter is a 53 y.o. male who has a hx of CAD and PAD s/p angioplasty and left iliac stenting who presents with 1.5 week history of dyspnea on exertion and b/l leg swelling R>L. Does not have any dyspnea at rest but feels like his discomfort with walking is similar to previous angina pain. Past Medical History:   Diagnosis Date    CAD (coronary artery disease)     Essential hypertension     Hypercholesterolemia     Hyperlipidemia     Hypertension     Myocardial infarct ([facility] [address]     Peripheral vascular disease (HCC)     s/p left iliac stenting x 3    Psychiatric disorder     depression    Stroke [facility])        Past Surgical History:   Procedure Laterality Date    CARDIAC SURG PROCEDURE UNLIST      HX CORONARY STENT PLACEMENT      2 STENTS 2012    HX HEART CATHETERIZATION      HX PTCA         Family History   Problem Relation Age of Onset    Heart Attack Father        Social History     Socioeconomic History    Marital status: SINGLE     Spouse name: Not on file    Number of children: Not on file    Years of education: Not on file    Highest education level: Not on file   Tobacco Use    Smoking status: Former Smoker    Smokeless tobacco: Never Used   Substance and Sexual Activity    Alcohol use: Yes     Comment: daily       Prior to Admission medications    Medication Sig Start Date End Date Taking? Authorizing Provider   amLODIPine (NORVASC) 5 mg tablet Take 5 mg by mouth daily. Provider, Historical   carvedilol (COREG) 25 mg tablet Take 25 mg by mouth two (2) times daily (with meals). Provider, Historical   aspirin (ASPIRIN) 325 mg tablet Take 325 mg by mouth daily. Provider, Historical   Hydrochlorothiazide (HYDRODIURIL) 12.5 mg tablet Take 12.5 mg by mouth daily. Provider, Historical   pravastatin (PRAVACHOL) 20 mg tablet Take 20 mg by mouth nightly. Provider, Historical   clopidogrel (PLAVIX) 75 mg tablet Take 1 Tab by mouth daily.  3/24/15   Mary Sharma MD   nitroglycerin (NITROSTAT) 0.4 mg SL tablet 1 Tab by SubLINGual route every five (5) minutes as needed for Chest Pain. 7/15/14   Miley Torres MD   losartan (COZAAR) 100 mg tablet Take 100 mg by mouth daily. Provider, Historical   NIFEdipine ER (ADALAT CC) 30 mg ER tablet Take 30 mg by mouth daily. Provider, Historical   levothyroxine (SYNTHROID) 25 mcg tablet Take 25 mcg by mouth Daily (before breakfast). Provider, Historical       No Known Allergies    Review of Systems  Gen: No fever, chills, malaise, weight loss/gain. Heent: No headache, rhinorrhea, epistaxis, ear pain, hearing loss, sinus pain, neck pain/stiffness, sore throat. Heart: No chest pain, palpitations, +VENEGAS, no pnd, or orthopnea. Resp: No cough, hemoptysis, wheezing and shortness of breath. GI: No nausea, vomiting, diarrhea, constipation, melena or hematochezia. : No urinary obstruction, dysuria or hematuria. Derm: No rash, new skin lesion or pruritis. Musc/skeletal: no bone or joint complaints. Vasc: +edema, no cyanosis or claudication. Endo: No heat/cold intolerance, no polyuria,polydipsia or polyphagia. Neuro: No unilateral weakness, numbness, tingling. No seizures. Heme: No easy bruising or bleeding. Physical Exam:     Physical Exam:  Visit Vitals  /78 (BP 1 Location: Right arm, BP Patient Position: At rest)   Pulse 71   Temp 97.8 °F (36.6 °C)   Resp 14   Ht 5' 5\" (1.651 m)   Wt 74.8 kg (165 lb)   SpO2 100%   BMI 27.46 kg/m²      O2 Device: Room air    Temp (24hrs), Av.4 °F (36.3 °C), Min:97 °F (36.1 °C), Max:97.8 °F (36.6 °C)    No intake/output data recorded. No intake/output data recorded. General:  Awake, cooperative, no distress. Head:  Normocephalic, without obvious abnormality, atraumatic. Eyes:  Conjunctivae/corneas clear, sclera anicteric, PERRL, EOMs intact. Neck: Supple, symmetrical, trachea midline, no adenopathy. Lungs:   Clear to auscultation bilaterally.    Heart:  Regular rate and rhythm, S1, S2 normal, no murmur, click, rub or gallop. Abdomen: Soft, non-tender. Bowel sounds normal. No masses,  No organomegaly. Extremities: Extremities normal, atraumatic, no cyanosis. 2+ pitting edema to the knee R>L. Capillary refill normal.   Pulses: 2+ and symmetric all extremities. Skin: Skin color pink, turgor normal. No rashes or lesions   Neurologic: No focal motor or sensory deficit. Labs Reviewed: All lab results for the last 24 hours reviewed. Recent Results (from the past 24 hour(s))   EKG, 12 LEAD, INITIAL    Collection Time: 04/27/20 10:24 PM   Result Value Ref Range    Ventricular Rate 73 BPM    Atrial Rate 73 BPM    P-R Interval 136 ms    QRS Duration 82 ms    Q-T Interval 382 ms    QTC Calculation (Bezet) 420 ms    Calculated P Axis 68 degrees    Calculated R Axis 49 degrees    Calculated T Axis 104 degrees    Diagnosis       Normal sinus rhythm  Nonspecific T wave abnormality  Abnormal ECG  When compared with ECG of 25-FEB-2013 09:34,  Minimal criteria for Inferior infarct are no longer present  Nonspecific T wave abnormality has replaced inverted T waves in Inferior   leads     CBC WITH AUTOMATED DIFF    Collection Time: 04/27/20 10:30 PM   Result Value Ref Range    WBC 5.1 4.6 - 13.2 K/uL    RBC 4.94 4.70 - 5.50 M/uL    HGB 11.6 (L) 13.0 - 16.0 g/dL    HCT 35.6 (L) 36.0 - 48.0 %    MCV 72.1 (L) 74.0 - 97.0 FL    MCH 23.5 (L) 24.0 - 34.0 PG    MCHC 32.6 31.0 - 37.0 g/dL    RDW 14.7 (H) 11.6 - 14.5 %    PLATELET 881 015 - 704 K/uL    MPV 10.9 9.2 - 11.8 FL    NEUTROPHILS 43 40 - 73 %    LYMPHOCYTES 40 21 - 52 %    MONOCYTES 11 (H) 3 - 10 %    EOSINOPHILS 5 0 - 5 %    BASOPHILS 1 0 - 2 %    ABS. NEUTROPHILS 2.2 1.8 - 8.0 K/UL    ABS. LYMPHOCYTES 2.1 0.9 - 3.6 K/UL    ABS. MONOCYTES 0.5 0.05 - 1.2 K/UL    ABS. EOSINOPHILS 0.2 0.0 - 0.4 K/UL    ABS.  BASOPHILS 0.0 0.0 - 0.1 K/UL    DF AUTOMATED     METABOLIC PANEL, BASIC    Collection Time: 04/27/20 10:30 PM   Result Value Ref Range    Sodium 141 136 - 145 mmol/L    Potassium 4.8 3.5 - 5.5 mmol/L    Chloride 112 (H) 100 - 111 mmol/L    CO2 22 21 - 32 mmol/L    Anion gap 7 3.0 - 18 mmol/L    Glucose 110 (H) 74 - 99 mg/dL    BUN 56 (H) 7.0 - 18 MG/DL    Creatinine 4.32 (H) 0.6 - 1.3 MG/DL    BUN/Creatinine ratio 13 12 - 20      GFR est AA 17 (L) >60 ml/min/1.73m2    GFR est non-AA 14 (L) >60 ml/min/1.73m2    Calcium 7.7 (L) 8.5 - 10.1 MG/DL   D DIMER    Collection Time: 04/27/20 10:30 PM   Result Value Ref Range    D DIMER 0.40 <0.46 ug/ml(FEU)   PTT    Collection Time: 04/27/20 10:30 PM   Result Value Ref Range    aPTT 31.1 23.0 - 36.4 SEC   CARDIAC PANEL,(CK, CKMB & TROPONIN)    Collection Time: 04/27/20 11:30 PM   Result Value Ref Range     39 - 308 U/L    CK - MB 1.3 <3.6 ng/ml    CK-MB Index 0.7 0.0 - 4.0 %    Troponin-I, QT <0.02 0.0 - 0.045 NG/ML       Results   XR CHEST PORT (Accession [id number]) (Order [id number])   Allergies      No Known Allergies   Exam Information     Status Exam Begun  Exam Ended    Final [99] 4/28/2020 00:07 4/28/2020 00:19   Result Information     Status: Final result (Exam End: 4/28/2020 00:19) Provider Status: Open   Study Result     ---------------------------------------------------------------------------  <<<<<<<<<           [facility]           >>>>>>>>>   ---------------------------------------------------------------------------     CLINICAL HISTORY:  Shortness of breath.     COMPARISON EXAMINATIONS:  May 14, 2012.        ---  SINGLE FRONTAL VIEW OF THE CHEST  ---     The lungs and pleural spaces are clear. The mediastinum is unremarkable in  appearance. No significant osseous abnormalities are identified.          --------------  IMPRESSION  Impression:  --------------     No active cardiopulmonary disease.

## 2020-04-28 NOTE — PROGRESS NOTES
0203:  Received telephone report from Linda Carrera RN in ED. Awaiting patient arrival to unit. 6695:  Patient arrived to unit via bed from ED with Linda Carrera RN. Bedside skin assessment performed, no abnormalities noted. Patient oriented to room, bed, telephone, television, bathroom, and call bell. Patient has no complaints of pain or discomfort at the time. Whiteboard updated, bed at the lowest position with call bell within reach.

## 2020-04-28 NOTE — ED TRIAGE NOTES
Pt presents to ED through triage wth c/o \"racing heart beat\" and right calf pain pt states \"right calf is swollen\" pt denies chest pain, back pain or cough at this time. Pt reports stroke 10 years ago and history of DVT pt reports takes blood thinners pt unable to remember name of meds.

## 2020-04-28 NOTE — PROGRESS NOTES
Reason for Admission:   Kalee Ho is a 53 y.o. male with pertinent PMHx of CAD, HTN, HLD, PVD, MI (stents), and stroke presenting ambulatory to the ED c/o 7/10 aching right calf pain and swelling x 1-2 weeks. Pt states that the swelling has been worsening. Pt notes associated symptoms of SOB and palpitations with ambulation/exertion; but denies CP, N/V, urinary sxs, or new cough. Pt is on ASA and Plavix. Pt notes a FHx of HTN, MI, and stroke (mother). Pt denies any personal FHx of CKD, despite record review showing such.     PCP: Zaid Henson MD                    RUR Score:       14               Plan for utilizing home health:          PCP: First and Last name:     Name of Practice:    Are you a current patient: Yes/No:    Approximate date of last visit:                     Current Advanced Directive/Advance Care Plan:                          Transition of Care Plan:  CM did not enter room due to covid restrictions. Cm has attempted to call room several times but no answer     Telephone call with RACHELLE Julien states patient is off unit. Chart reviewed. patient has Dr. Luis Zimmer for pcp and [employer].   Cm will continue to follow

## 2020-04-28 NOTE — PROGRESS NOTES
Pt has no chest pain/no palpitation  No dvt noted   · No evidence of any arterial occlusive disease in vessels of right side. Triphasic signal noted throughout. · Left common femoral artery shows absence of reverse flow. No evidence of significant peripheral arterial occlusive disease in the left leg  · No evidence of deep vein thrombosis in the right lower extremity. No evidence of deep vein thrombosis in the left lower extremity.     Completed stress test/echo- results still pending

## 2020-04-28 NOTE — CONSULTS
[facility] Consult Note      Patient: Amanda Mendez MRN: [id number]  SSN: [id number]    YOB: 1965  Age: 53 y.o. Sex: male    Date of Consultation: 04/28/2020  Referring Physician: Dr. Anisa Jauregui  Reason for Consultation: Glenn López and CAD    HPI:  I was asked by Dr. Dr. Anisa Jauregui to see this pleasant patient for VENEGAS, leg edema chest discomfort. Amanda Mendez is very pleasant gentleman with PMH of CAD, HTN, Hyperlipidemia, Stroke, PAD and chronic renal insufficiency. Patient admitted in the hospital with progressive dyspnea on exertion, associated with some chest discomfort and palpitation. Patient also have symptom of bilateral lower extremity swelling right more than left. Patient have known history of CAD including PCI to left circumflex artery and iliac artery in 2012. patient denied symptoms of orthopnea or PND. Patient denied fever, cough or productive sputum. No history of DVT or pulmonary embolism. Patient also had left-sided weakness and mild expressive aphasia.     Past Medical History:   Diagnosis Date    CAD (coronary artery disease)     Essential hypertension     Hypercholesterolemia     Hyperlipidemia     Hypertension     Myocardial infarct ([address]     Peripheral vascular disease (HCC)     s/p left iliac stenting x 3    Psychiatric disorder     depression    Stroke [facility])      Past Surgical History:   Procedure Laterality Date    CARDIAC SURG PROCEDURE UNLIST      HX CORONARY STENT PLACEMENT      2 STENTS 2012    HX HEART CATHETERIZATION      HX PTCA       Current Facility-Administered Medications   Medication Dose Route Frequency    amLODIPine (NORVASC) tablet 5 mg  5 mg Oral DAILY    aspirin tablet 325 mg  325 mg Oral DAILY    carvediloL (COREG) tablet 25 mg  25 mg Oral BID WITH MEALS    clopidogreL (PLAVIX) tablet 75 mg  75 mg Oral DAILY    hydroCHLOROthiazide (HYDRODIURIL) tablet 12.5 mg  12.5 mg Oral DAILY    levothyroxine (SYNTHROID) tablet 25 mcg  25 mcg Oral ACB    losartan (COZAAR) tablet 100 mg  100 mg Oral DAILY    pravastatin (PRAVACHOL) tablet 20 mg  20 mg Oral QHS    sodium chloride (NS) flush 5-40 mL  5-40 mL IntraVENous Q8H    sodium chloride (NS) flush 5-40 mL  5-40 mL IntraVENous PRN    ondansetron (ZOFRAN) injection 4 mg  4 mg IntraVENous Q4H PRN    heparin 25,000 units in D5W 250 ml infusion  12-25 Units/kg/hr IntraVENous TITRATE    albumin human 25% (BUMINATE) solution 25 g  25 g IntraVENous Q6H    0.9% sodium chloride infusion  100 mL/hr IntraVENous CONTINUOUS       Allergies and Intolerances:   No Known Allergies    Family History:   Family History   Problem Relation Age of Onset    Heart Attack Father        Social History:   He  reports that he has quit smoking. He has never used smokeless tobacco.  He  reports current alcohol use. Review of Systems:     Review of Systems  Gen: No fever, chills, malaise, weight loss/gain. Heent: No headache, rhinorrhea, epistaxis, ear pain, hearing loss, sinus pain, neck pain/stiffness, sore throat. Heart: + chest pain, palpitations, +VENEGAS, pnd, or orthopnea. Resp: No cough, hemoptysis, wheezing and shortness of breath. GI: No nausea, vomiting, diarrhea, constipation, melena or hematochezia. : No urinary obstruction, dysuria or hematuria. Derm: No rash, new skin lesion or pruritis. Musc/skeletal: no bone or joint complains. Vasc: + edema, cyanosis or claudication. Endo: No heat/cold intolerance, no polyuria,polydipsia or polyphagia. Neuro: No unilateral weakness, numbness, tingling. No seizures. Heme: No easy bruising or bleeding. Physical:   Patient Vitals for the past 6 hrs:   Temp Pulse Resp BP SpO2   04/28/20 1104 97.9 °F (36.6 °C) 73 14 131/74 100 %   04/28/20 0946    124/84          Exam:   General Appearance: Comfortable, not using accessory muscles of respiration. HEENT: BIJU. HEAD: Atraumatic  NECK: No JVD, no thyroidomeglay. LUNGS: Clear bilaterally.    HEART: S1+S2 ABD: Non-tender, BS Audible    EXT: trace edema, and no cysnosis. VASCULAR EXAM: Pulses are intact. PSYCHIATRIC EXAM: Mood is appropriate. MUSCULOSKELETAL: Grossly no joint deformity. NEUROLOGICAL: Motor and sensory sytem intact and Cranial nerves II-XII intact.     Review of Data:   LABS:   Lab Results   Component Value Date/Time    WBC 6.0 04/28/2020 05:53 AM    HGB 11.2 (L) 04/28/2020 05:53 AM    HCT 35.0 (L) 04/28/2020 05:53 AM    PLATELET 024 47/30/0090 05:53 AM     Lab Results   Component Value Date/Time    Sodium 144 04/28/2020 05:53 AM    Potassium 4.3 04/28/2020 05:53 AM    Chloride 114 (H) 04/28/2020 05:53 AM    CO2 22 04/28/2020 05:53 AM    Glucose 93 04/28/2020 05:53 AM    BUN 56 (H) 04/28/2020 05:53 AM    Creatinine 4.16 (H) 04/28/2020 05:53 AM     Lab Results   Component Value Date/Time    Cholesterol, total 142 02/25/2013 09:38 AM    HDL Cholesterol 66 (H) 02/25/2013 09:38 AM    LDL,Direct 124 (H) 06/22/2011 02:05 AM    LDL, calculated 60.2 02/25/2013 09:38 AM    Triglyceride 79 02/25/2013 09:38 AM     No results found for: GPT  No results found for: HBA1C, HGBE8, OJP9TGUY, YPD0VYCG      Cardiology Procedures:   Results for orders placed or performed during the hospital encounter of 04/27/20   EKG, 12 LEAD, INITIAL   Result Value Ref Range    Ventricular Rate 73 BPM    Atrial Rate 73 BPM    P-R Interval 136 ms    QRS Duration 82 ms    Q-T Interval 382 ms    QTC Calculation (Bezet) 420 ms    Calculated P Axis 68 degrees    Calculated R Axis 49 degrees    Calculated T Axis 104 degrees    Diagnosis       Normal sinus rhythm  Nonspecific T wave abnormality  Abnormal ECG  When compared with ECG of 25-FEB-2013 09:34,  Minimal criteria for Inferior infarct are no longer present  Nonspecific T wave abnormality has replaced inverted T waves in Inferior   leads             Impression / Plan:    Patient Active Problem List   Diagnosis Code    CAD (coronary artery disease), native coronary artery I25.10  PVD (peripheral vascular disease) with claudication ([facility] I73.9    HBP (high blood pressure) I10    Chest pain, unspecified R07.9    High cholesterol E78.00    H/O: CVA (cerebrovascular accident) Z80.78    H/O acute myocardial infarction I25.2    S/P angioplasty with stent Z95.820    S/P insertion of iliac artery stent Z95.828    Peripheral vascular disease (HCC) I73.9    Hypertension I10    Acute on chronic renal failure (HCC) N17.9, N18.9   Stress test done today. Nuclear Cardiac Spect Rest then Gated Stress study. Lynn Buck was used as the stressing method and agent. One day myocardial perfusion study. Date: 4/28/2020. Positive myocardial perfusion imaging. Myocardial perfusion imaging supports an intermediate risk stress test. This is a abnormal  Pharmacological Lexiscan nuclear stress test.  Mild basal ischemia in inferior wall from mid to base on non attenuated corrected images. Non diagnostic EKG changes. EF 69%. Assessment and plan    Dyspnea on exertion with chest discomfort and palpitation. CAD space history of PCI to left circumflex artery in 2012  PAD Status post angioplasty and stenting to the left iliac artery. Acute on chronic renal failure  Hypertension  History of stroke. Plan     patient's symptoms are concerning for angina. Patient also have abnormal stress test.  Patient has significant renal disease. I would maximize medical  Anti ischemic treatment and will consider cardiac catheterization if cleared by Nephrology in the setting of elevated risk of contrast induced nephropathy. Discussed with the patient. I will add isosorbide mononitrate 30 mg once a day.     Continue with aspirin, Plavix, amlodipine, carvedilol and pravastatin patient      Signed By: Kika Odonnell MD     April 28, 2020

## 2020-04-29 PROBLEM — E88.09 HYPOALBUMINEMIA: Status: ACTIVE | Noted: 2020-04-29

## 2020-04-29 LAB
ALBUMIN SERPL-MCNC: 3.3 G/DL (ref 3.4–5)
ALBUMIN SERPL-MCNC: 3.5 G/DL (ref 3.4–5)
ALBUMIN/GLOB SERPL: 1.2 {RATIO} (ref 0.8–1.7)
ALP SERPL-CCNC: 66 U/L (ref 45–117)
ALT SERPL-CCNC: 35 U/L (ref 16–61)
ANION GAP SERPL CALC-SCNC: 7 MMOL/L (ref 3–18)
ANION GAP SERPL CALC-SCNC: 8 MMOL/L (ref 3–18)
APTT PPP: 102.9 SEC (ref 23–36.4)
AST SERPL-CCNC: 22 U/L (ref 10–38)
BASOPHILS # BLD: 0 K/UL (ref 0–0.1)
BASOPHILS # BLD: 0 K/UL (ref 0–0.1)
BASOPHILS NFR BLD: 0 % (ref 0–2)
BASOPHILS NFR BLD: 1 % (ref 0–2)
BILIRUB SERPL-MCNC: 0.3 MG/DL (ref 0.2–1)
BUN SERPL-MCNC: 38 MG/DL (ref 7–18)
BUN SERPL-MCNC: 41 MG/DL (ref 7–18)
BUN/CREAT SERPL: 11 (ref 12–20)
BUN/CREAT SERPL: 12 (ref 12–20)
CALCIUM SERPL-MCNC: 8.1 MG/DL (ref 8.5–10.1)
CALCIUM SERPL-MCNC: 8.3 MG/DL (ref 8.5–10.1)
CALCIUM SERPL-MCNC: 8.3 MG/DL (ref 8.5–10.1)
CHLORIDE SERPL-SCNC: 114 MMOL/L (ref 100–111)
CHLORIDE SERPL-SCNC: 117 MMOL/L (ref 100–111)
CO2 SERPL-SCNC: 20 MMOL/L (ref 21–32)
CO2 SERPL-SCNC: 23 MMOL/L (ref 21–32)
CREAT SERPL-MCNC: 3.34 MG/DL (ref 0.6–1.3)
CREAT SERPL-MCNC: 3.38 MG/DL (ref 0.6–1.3)
DIFFERENTIAL METHOD BLD: ABNORMAL
DIFFERENTIAL METHOD BLD: ABNORMAL
EOSINOPHIL # BLD: 0.2 K/UL (ref 0–0.4)
EOSINOPHIL # BLD: 0.2 K/UL (ref 0–0.4)
EOSINOPHIL NFR BLD: 4 % (ref 0–5)
EOSINOPHIL NFR BLD: 5 % (ref 0–5)
ERYTHROCYTE [DISTWIDTH] IN BLOOD BY AUTOMATED COUNT: 14.8 % (ref 11.6–14.5)
ERYTHROCYTE [DISTWIDTH] IN BLOOD BY AUTOMATED COUNT: 14.9 % (ref 11.6–14.5)
GLOBULIN SER CALC-MCNC: 2.9 G/DL (ref 2–4)
GLUCOSE SERPL-MCNC: 106 MG/DL (ref 74–99)
GLUCOSE SERPL-MCNC: 74 MG/DL (ref 74–99)
HCT VFR BLD AUTO: 30.5 % (ref 36–48)
HCT VFR BLD AUTO: 31 % (ref 36–48)
HGB BLD-MCNC: 10 G/DL (ref 13–16)
HGB BLD-MCNC: 9.7 G/DL (ref 13–16)
LYMPHOCYTES # BLD: 1.3 K/UL (ref 0.9–3.6)
LYMPHOCYTES # BLD: 2.3 K/UL (ref 0.9–3.6)
LYMPHOCYTES NFR BLD: 33 % (ref 21–52)
LYMPHOCYTES NFR BLD: 46 % (ref 21–52)
MCH RBC QN AUTO: 23 PG (ref 24–34)
MCH RBC QN AUTO: 23.3 PG (ref 24–34)
MCHC RBC AUTO-ENTMCNC: 31.8 G/DL (ref 31–37)
MCHC RBC AUTO-ENTMCNC: 32.3 G/DL (ref 31–37)
MCV RBC AUTO: 72.3 FL (ref 74–97)
MCV RBC AUTO: 72.4 FL (ref 74–97)
MONOCYTES # BLD: 0.4 K/UL (ref 0.05–1.2)
MONOCYTES # BLD: 0.5 K/UL (ref 0.05–1.2)
MONOCYTES NFR BLD: 13 % (ref 3–10)
MONOCYTES NFR BLD: 9 % (ref 3–10)
NEUTS SEG # BLD: 2 K/UL (ref 1.8–8)
NEUTS SEG # BLD: 2 K/UL (ref 1.8–8)
NEUTS SEG NFR BLD: 40 % (ref 40–73)
NEUTS SEG NFR BLD: 49 % (ref 40–73)
PHOSPHATE SERPL-MCNC: 2.8 MG/DL (ref 2.5–4.9)
PHOSPHATE SERPL-MCNC: 3.7 MG/DL (ref 2.5–4.9)
PLATELET # BLD AUTO: 155 K/UL (ref 135–420)
PLATELET # BLD AUTO: 159 K/UL (ref 135–420)
PLATELET COMMENTS,PCOM: ABNORMAL
PMV BLD AUTO: 10.1 FL (ref 9.2–11.8)
PMV BLD AUTO: 10.5 FL (ref 9.2–11.8)
POTASSIUM SERPL-SCNC: 3.9 MMOL/L (ref 3.5–5.5)
POTASSIUM SERPL-SCNC: 4.4 MMOL/L (ref 3.5–5.5)
PROT SERPL-MCNC: 6.4 G/DL (ref 6.4–8.2)
PTH-INTACT SERPL-MCNC: 215.1 PG/ML (ref 18.4–88)
RBC # BLD AUTO: 4.21 M/UL (ref 4.7–5.5)
RBC # BLD AUTO: 4.29 M/UL (ref 4.7–5.5)
RBC MORPH BLD: ABNORMAL
RHEUMATOID FACT SERPL-ACNC: <10 IU/ML
SODIUM SERPL-SCNC: 144 MMOL/L (ref 136–145)
SODIUM SERPL-SCNC: 145 MMOL/L (ref 136–145)
WBC # BLD AUTO: 4 K/UL (ref 4.6–13.2)
WBC # BLD AUTO: 4.9 K/UL (ref 4.6–13.2)

## 2020-04-29 PROCEDURE — 80053 COMPREHEN METABOLIC PANEL: CPT

## 2020-04-29 PROCEDURE — 80069 RENAL FUNCTION PANEL: CPT

## 2020-04-29 PROCEDURE — 74011250637 HC RX REV CODE- 250/637: Performed by: INTERNAL MEDICINE

## 2020-04-29 PROCEDURE — 74011250636 HC RX REV CODE- 250/636: Performed by: FAMILY MEDICINE

## 2020-04-29 PROCEDURE — P9047 ALBUMIN (HUMAN), 25%, 50ML: HCPCS | Performed by: FAMILY MEDICINE

## 2020-04-29 PROCEDURE — 83970 ASSAY OF PARATHORMONE: CPT

## 2020-04-29 PROCEDURE — 96376 TX/PRO/DX INJ SAME DRUG ADON: CPT

## 2020-04-29 PROCEDURE — 96372 THER/PROPH/DIAG INJ SC/IM: CPT

## 2020-04-29 PROCEDURE — 36415 COLL VENOUS BLD VENIPUNCTURE: CPT

## 2020-04-29 PROCEDURE — 74011250637 HC RX REV CODE- 250/637: Performed by: FAMILY MEDICINE

## 2020-04-29 PROCEDURE — 99218 HC RM OBSERVATION: CPT

## 2020-04-29 PROCEDURE — 85025 COMPLETE CBC W/AUTO DIFF WBC: CPT

## 2020-04-29 PROCEDURE — 84100 ASSAY OF PHOSPHORUS: CPT

## 2020-04-29 PROCEDURE — 82784 ASSAY IGA/IGD/IGG/IGM EACH: CPT

## 2020-04-29 PROCEDURE — 85730 THROMBOPLASTIN TIME PARTIAL: CPT

## 2020-04-29 PROCEDURE — 65660000000 HC RM CCU STEPDOWN

## 2020-04-29 PROCEDURE — 86431 RHEUMATOID FACTOR QUANT: CPT

## 2020-04-29 PROCEDURE — 74011250636 HC RX REV CODE- 250/636: Performed by: INTERNAL MEDICINE

## 2020-04-29 PROCEDURE — 74011250636 HC RX REV CODE- 250/636: Performed by: EMERGENCY MEDICINE

## 2020-04-29 PROCEDURE — 86038 ANTINUCLEAR ANTIBODIES: CPT

## 2020-04-29 PROCEDURE — 86160 COMPLEMENT ANTIGEN: CPT

## 2020-04-29 RX ORDER — HEPARIN SODIUM 5000 [USP'U]/ML
5000 INJECTION, SOLUTION INTRAVENOUS; SUBCUTANEOUS EVERY 8 HOURS
Status: DISCONTINUED | OUTPATIENT
Start: 2020-04-29 | End: 2020-05-01 | Stop reason: HOSPADM

## 2020-04-29 RX ADMIN — Medication 10 ML: at 22:05

## 2020-04-29 RX ADMIN — CARVEDILOL 25 MG: 12.5 TABLET, FILM COATED ORAL at 07:14

## 2020-04-29 RX ADMIN — HYDROCHLOROTHIAZIDE 12.5 MG: 25 TABLET ORAL at 08:34

## 2020-04-29 RX ADMIN — ALBUMIN (HUMAN) 25 G: 0.25 INJECTION, SOLUTION INTRAVENOUS at 13:08

## 2020-04-29 RX ADMIN — Medication 10 ML: at 13:08

## 2020-04-29 RX ADMIN — ALBUMIN (HUMAN) 25 G: 0.25 INJECTION, SOLUTION INTRAVENOUS at 17:28

## 2020-04-29 RX ADMIN — AMLODIPINE BESYLATE 5 MG: 5 TABLET ORAL at 08:35

## 2020-04-29 RX ADMIN — LEVOTHYROXINE SODIUM 25 MCG: 0.03 TABLET ORAL at 07:14

## 2020-04-29 RX ADMIN — ISOSORBIDE MONONITRATE 30 MG: 30 TABLET, EXTENDED RELEASE ORAL at 08:34

## 2020-04-29 RX ADMIN — ALBUMIN (HUMAN) 25 G: 0.25 INJECTION, SOLUTION INTRAVENOUS at 23:05

## 2020-04-29 RX ADMIN — HEPARIN SODIUM 11 UNITS/KG/HR: 10000 INJECTION, SOLUTION INTRAVENOUS at 07:13

## 2020-04-29 RX ADMIN — ALBUMIN (HUMAN) 25 G: 0.25 INJECTION, SOLUTION INTRAVENOUS at 00:56

## 2020-04-29 RX ADMIN — ALBUMIN (HUMAN) 25 G: 0.25 INJECTION, SOLUTION INTRAVENOUS at 05:35

## 2020-04-29 RX ADMIN — CLOPIDOGREL BISULFATE 75 MG: 75 TABLET ORAL at 08:34

## 2020-04-29 RX ADMIN — ASPIRIN 325 MG ORAL TABLET 325 MG: 325 PILL ORAL at 08:34

## 2020-04-29 RX ADMIN — PRAVASTATIN SODIUM 20 MG: 20 TABLET ORAL at 22:05

## 2020-04-29 RX ADMIN — CARVEDILOL 25 MG: 12.5 TABLET, FILM COATED ORAL at 17:28

## 2020-04-29 RX ADMIN — HEPARIN SODIUM 5000 UNITS: 5000 INJECTION INTRAVENOUS; SUBCUTANEOUS at 17:28

## 2020-04-29 RX ADMIN — Medication 10 ML: at 05:35

## 2020-04-29 NOTE — PROGRESS NOTES
Transition of care: anticipate home when medically cleared. Did not enter room due to covid restrictions. Telephone call with patient to his room  Patient reports he lives alone but ha 3 sisters that assist him as needed. Patient has [employer] medicaid for insurance. Patient has  Dr. Sandra Suero for pcp. Patient reports he is IADL's. Patient reports his sister will drive him home  He states he would like to talk to pastoral care about poa and make hiis sister poa. Cm left message for pastoral care to address acp  Pt recommendations for safe transition. Care Management Interventions  PCP Verified by CM:  Yes  Transition of Care Consult (CM Consult): Discharge Planning  Current Support Network: Relative's Home  Confirm Follow Up Transport: Family  The Plan for Transition of Care is Related to the Following Treatment Goals : d/c home family when medically cleared  The Patient and/or Patient Representative was Provided with a Choice of Provider and Agrees with the Discharge Plan?: Yes  Freedom of Choice List was Provided with Basic Dialogue that Supports the Patient's Individualized Plan of Care/Goals, Treatment Preferences and Shares the Quality Data Associated with the Providers?: Yes  Discharge Location  Discharge Placement: Home with family assistance

## 2020-04-29 NOTE — PROGRESS NOTES
Nephrology Progress note    Subjective:     Melissa Harkins is a 53 y.o. male with a past medical history significant for HTN, CAD, CVA, Chronic Hepatitis C, CKD who presented to the ED with c/o Palpitation, Dyspnea on exertion and Right Calf pain/swelling. Patient admitted and started on IV Heparin. Labs on admission revealed a S Cr 4.3 and repeat today 4. 16. Available labs from [facility] records reveal S Cr of 2.7 on 2/11/20, 2.39 back on 8/13/19  Work up thus far:  No evidence of DVT on Duplex study of lower extremities. Arterial duplex study showed no evidence of significant peripheral arterial occlusive disease in both lower extremities. Nuclear Stress test Abnormal. ECHO EF 55%. Nephrology was consulted for further evaluation and management of Renal Failure. Patient denies use of NSAIDs or herbal supplements. No h/o Kidney stones, Prostatic disease or Kidney infections. Denies CP/SOB today.      Admit Date: 4/27/2020  Principal Problem:    Acute on chronic renal failure (HCC) (4/28/2020)    Active Problems:    CAD (coronary artery disease), native coronary artery (5/12/2012)      Chest pain, unspecified (10/15/2013)      Peripheral vascular disease ([facility] [address] (3/24/2015)      Current Facility-Administered Medications   Medication Dose Route Frequency    heparin (porcine) injection 5,000 Units  5,000 Units SubCUTAneous Q8H    amLODIPine (NORVASC) tablet 5 mg  5 mg Oral DAILY    aspirin tablet 325 mg  325 mg Oral DAILY    carvediloL (COREG) tablet 25 mg  25 mg Oral BID WITH MEALS    clopidogreL (PLAVIX) tablet 75 mg  75 mg Oral DAILY    hydroCHLOROthiazide (HYDRODIURIL) tablet 12.5 mg  12.5 mg Oral DAILY    levothyroxine (SYNTHROID) tablet 25 mcg  25 mcg Oral ACB    pravastatin (PRAVACHOL) tablet 20 mg  20 mg Oral QHS    sodium chloride (NS) flush 5-40 mL  5-40 mL IntraVENous Q8H    sodium chloride (NS) flush 5-40 mL  5-40 mL IntraVENous PRN    ondansetron (ZOFRAN) injection 4 mg  4 mg IntraVENous Q4H PRN    albumin human 25% (BUMINATE) solution 25 g  25 g IntraVENous Q6H    0.9% sodium chloride infusion  100 mL/hr IntraVENous CONTINUOUS    isosorbide mononitrate ER (IMDUR) tablet 30 mg  30 mg Oral DAILY         Allergy:   No Known Allergies     Objective:     Visit Vitals  /73 (BP 1 Location: Right arm, BP Patient Position: At rest)   Pulse 68   Temp 97.8 °F (36.6 °C)   Resp 16   Ht 5' 5\" (1.651 m)   Wt 76.4 kg (168 lb 8 oz)   SpO2 100%   BMI 28.04 kg/m²         Intake/Output Summary (Last 24 hours) at 4/29/2020 1613  Last data filed at 4/29/2020 1159  Gross per 24 hour   Intake 360 ml   Output 650 ml   Net -290 ml       Physical Exam:       General: No acute distress   HENT: Atraumatic and normocephalic   Eyes: Normal conjunctiva   Neck: Supple    Cardiovascular: Normal S1 & S2, no m/r/g   Pulmonary/Chest Wall: Clear to auscultation bilaterally   Abdominal: Soft and non-tender   Musculoskeletal: No edema   Neurological: No focal deficits     Data Review:  Lab Results   Component Value Date/Time    Sodium 145 04/29/2020 03:15 AM    Potassium 3.9 04/29/2020 03:15 AM    Chloride 114 (H) 04/29/2020 03:15 AM    CO2 23 04/29/2020 03:15 AM    Anion gap 8 04/29/2020 03:15 AM    Glucose 74 04/29/2020 03:15 AM    BUN 41 (H) 04/29/2020 03:15 AM    Creatinine 3.38 (H) 04/29/2020 03:15 AM    BUN/Creatinine ratio 12 04/29/2020 03:15 AM    GFR est AA 23 (L) 04/29/2020 03:15 AM    GFR est non-AA 19 (L) 04/29/2020 03:15 AM    Calcium 8.3 (L) 04/29/2020 03:15 AM    Calcium 8.3 (L) 04/29/2020 03:15 AM     Lab Results   Component Value Date/Time    WBC 4.0 (L) 04/29/2020 11:56 AM    HGB 9.7 (L) 04/29/2020 11:56 AM    HCT 30.5 (L) 04/29/2020 11:56 AM    PLATELET 700 20/33/0738 11:56 AM    MCV 72.4 (L) 04/29/2020 11:56 AM     Lab Results   Component Value Date/Time    Calcium 8.3 (L) 04/29/2020 03:15 AM    Calcium 8.3 (L) 04/29/2020 03:15 AM    Phosphorus 3.7 04/29/2020 03:15 AM     No results found for: IRON, FE, TIBC, IBCT, PSAT, FERR  No results found for: FERR       Impression:      1. Acute Kidney Injury on CKD vs Progression of CKD. Possibly has some Prerenal component. Patient likely has underlying CKD sec to Hypertensive Nephrosclerosis. Possible Chronic glomerular disease sec to chronic Hep C.  S Cr improving with IV Hydration, S Cr 4.3=> 3.38 today. 2. HTN  3. Proteinuria, Prot/Cr ratio 5.0, Nephrotic range possibly sec to hypertensive Nephrosclerosis, possible Hep C related glomerulopathy, possible  FSGS. Rh Factor neg. 4. CAD s/p PTCA  5. PAD  6. Anemia likely sec to CKD  7. Chronic Hep C  8. Hypoalbuminemia     Plan:      1. Continue IV Fluid, decrease rate to 75ml/hr  2. Hold ARB Cozaar for now. Continue Amlodipine and Carvedilol  3. Monitor Serum chemistry  4. Monitor I/O's  5. F/u on  Complements C3, C4, CH50,  LIANA, SIFE. 6. Will check Cryoglobulins  7. F/u on  PTH  8. Recommend evaluation and treatment of Chronic Hep C  By Hepatologist, discussed with patient. 9. Recommend Nephrology Follow up after discharge. 10. Cardiac work up in progress. Would avoid IV contrast dye for now due to BRIANNA. Would like to see S Cr return to recent baseline if possible before IV contrast exposure.      Mecca Guerrero MD, MPH [facility]  [phone]

## 2020-04-29 NOTE — PROGRESS NOTES
Cardiology Progress Note        Patient: Fayrene Seip        Sex: male          DOA: 4/27/2020  YOB: 1965      Age:  53 y.o.        LOS:  LOS: 1 day   Assessment/Plan     Principal Problem:    Acute on chronic renal failure ([facility] [address] (4/28/2020)    Active Problems:    CAD (coronary artery disease), native coronary artery (5/12/2012)      Chest pain, unspecified (10/15/2013)      Peripheral vascular disease ([facility] [address] (3/24/2015)        Plan:  SOB stable EF normal.    CAD PCI in past- stress test is abnormal stable and on appropriate med and anti ischemic treatment, isosorbide mononitrate was started yesterday, will consider invasive testing /treatment when permissive from renal stand point in future. No evidence of ACS/NSTEMI. Discussed with patient  PAD stable duplex  No DVT  DC heparin and start DVT prophylaxis heparin. Acute on chronic renal failure treatment as per nephrology. Subjective:    cc:  SOB  CAD  PAD        REVIEW OF SYSTEMS:     General: No fevers or chills. Cardiovascular: No chest pain or pressure. No palpitations. No ankle swelling  Pulmonary: No SOB, orthopnea, PND  Gastrointestinal: No nausea, vomiting or diarrhea      Objective:      Visit Vitals  /73 (BP 1 Location: Right arm, BP Patient Position: At rest)   Pulse 68   Temp 97.8 °F (36.6 °C)   Resp 16   Ht 5' 5\" (1.651 m)   Wt 76.4 kg (168 lb 8 oz)   SpO2 100%   BMI 28.04 kg/m²     Body mass index is 28.04 kg/m². Physical Exam:  General Appearance: Comfortable, not using accessory muscles of respiration. NECK: No JVD, no thyroidomeglay. LUNGS: Clear bilaterally. HEART: S1+S2 audible,    ABD: Non-tender, BS Audible    EXT: No edema, and no cysnosis. VASCULAR EXAM: Pulses are intact. PSYCHIATRIC EXAM: Mood is appropriate.     Medication:  Current Facility-Administered Medications   Medication Dose Route Frequency    heparin (porcine) injection 5,000 Units  5,000 Units SubCUTAneous Q8H    amLODIPine (NORVASC) tablet 5 mg  5 mg Oral DAILY    aspirin tablet 325 mg  325 mg Oral DAILY    carvediloL (COREG) tablet 25 mg  25 mg Oral BID WITH MEALS    clopidogreL (PLAVIX) tablet 75 mg  75 mg Oral DAILY    hydroCHLOROthiazide (HYDRODIURIL) tablet 12.5 mg  12.5 mg Oral DAILY    levothyroxine (SYNTHROID) tablet 25 mcg  25 mcg Oral ACB    pravastatin (PRAVACHOL) tablet 20 mg  20 mg Oral QHS    sodium chloride (NS) flush 5-40 mL  5-40 mL IntraVENous Q8H    sodium chloride (NS) flush 5-40 mL  5-40 mL IntraVENous PRN    ondansetron (ZOFRAN) injection 4 mg  4 mg IntraVENous Q4H PRN    albumin human 25% (BUMINATE) solution 25 g  25 g IntraVENous Q6H    0.9% sodium chloride infusion  100 mL/hr IntraVENous CONTINUOUS    isosorbide mononitrate ER (IMDUR) tablet 30 mg  30 mg Oral DAILY               Lab/Data Reviewed:  Procedures/imaging: see electronic medical records for all procedures/Xrays   and details which were not copied into this note but were reviewed prior to creation of Plan       All lab results for the last 24 hours reviewed.      Recent Labs     04/29/20  1156 04/29/20  0315 04/28/20  0553   WBC 4.0* 4.9 6.0   HGB 9.7* 10.0* 11.2*   HCT 30.5* 31.0* 35.0*    159 175     Recent Labs     04/29/20  0315 04/28/20  0553 04/27/20  2230    144 141   K 3.9 4.3 4.8   * 114* 112*   CO2 23 22 22   GLU 74 93 110*   BUN 41* 56* 56*   CREA 3.38* 4.16* 4.32*   CA 8.3*  8.3* 8.2* 7.7*       Signed By: Kika Odonnell MD     April 29, 2020

## 2020-04-29 NOTE — PROGRESS NOTES
NUTRITION UPDATE    -provided diet education in d/c packet; appreciate consult    Kalyan Gray RD  PAGER:  [phone]

## 2020-04-29 NOTE — PROGRESS NOTES
1905:  Assumed care for patient, received bedside report from Joey Leone RN. Patient quietly lying in bed watching television with no complaints of pain or discomfort at the time. Heparin drip verified to be running at 11 units/kg/hr, next PTT tomorrow morning. Whiteboard updated, bed at the lowest position with call bell within reach.

## 2020-04-29 NOTE — PROGRESS NOTES
0710 Assumed care of the patient from Alfredo Field RN (offgoing Nurse). Heparin drip verified with offgoing nurse. Patient is alert and oriented. Pt denies any pain or discomfort at this moment. bed in low position, call bell within reach. 1900 Patient had an uneventful shift and remained stable. Purposeful hourly rounding completed throughout the shift, NAD noted at this time, and patient is resting quietly in bed. No concerns or requests voiced    1920 Bedside and Verbal shift change report given to Fide BUHS (oncoming nurse) by Fabi Meza RN (offgoing nurse). Report included the following information SBAR, Kardex, MAR, Recent Results and Cardiac Rhythm .

## 2020-04-29 NOTE — PROGRESS NOTES
Hospitalist Progress Note-critical care note     Patient: Kendra Deal MRN: [id number]  CSN: [id number]    YOB: 1965  Age: 53 y.o. Sex: male    DOA: 4/27/2020 LOS:  LOS: 1 day            Chief complaint: hypoalbuminemia , pvd. Htn, chest pain, cad   Assessment/Plan         Hospital Problems  Date Reviewed: 4/28/2020          Codes Class Noted POA    Hypoalbuminemia ICD-10-CM: E88.09  ICD-9-CM: 273.8  4/29/2020 Unknown        * (Principal) Acute on chronic renal failure ([facility] ICD-10-CM: N17.9, N18.9  ICD-9-CM: 584.9, 585.9  4/28/2020 Yes        Peripheral vascular disease ([facility] ICD-10-CM: I73.9  ICD-9-CM: 443.9  3/24/2015 Yes        Hypertension ICD-10-CM: I10  ICD-9-CM: 401.9  3/24/2015 Yes        Chest pain, unspecified ICD-10-CM: R07.9  ICD-9-CM: 786.50  10/15/2013 Yes        CAD (coronary artery disease), native coronary artery ICD-10-CM: I25.10  ICD-9-CM: 414.01  5/12/2012 Yes            conrado on ckd3  Got improving, renal on board   Continue iv hydration   Renal dose meds and avoid nsaids     Chest pain with cad3  No chest pain now   d/c heparin gtt   Stress test abnormal -medical treatment per cardiologist     Pad   Stable     Hypoalbuminemia   Will continue give albumin for one more day     Subjective: no chest pain now, no leg swelling, feel better     Rn: no acute issue       Disposition :2-3 days   Review of systems:    General: No fevers or chills. Cardiovascular: No chest pain or pressure. No palpitations. Pulmonary: No shortness of breath. Gastrointestinal: No nausea, vomiting.      Vital signs/Intake and Output:  Visit Vitals  /73 (BP 1 Location: Right arm, BP Patient Position: At rest)   Pulse 68   Temp 97.8 °F (36.6 °C)   Resp 16   Ht 5' 5\" (1.651 m)   Wt 76.4 kg (168 lb 8 oz)   SpO2 100%   BMI 28.04 kg/m²     Current Shift:  04/29 0701 - 04/29 1900  In: 240 [P.O.:240]  Out: 400 [Urine:400]  Last three shifts:  04/27 1901 - 04/29 0700  In: 460 [P.O.:360; I.V.:100]  Out: 250 [Urine:250]    Physical Exam:  General: WD, WN. Alert, cooperative, no acute distress    HEENT: NC, Atraumatic. PERRLA, anicteric sclerae. Lungs: CTA Bilaterally. No Wheezing/Rhonchi/Rales. Heart:  Regular  rhythm,  No murmur, No Rubs, No Gallops  Abdomen: Soft, Non distended, Non tender. +Bowel sounds,   Extremities: No c/c/e  Psych:   Not anxious or agitated. Neurologic:  No acute neurological deficit. Labs: Results:       Chemistry Recent Labs     04/29/20 0315 04/28/20  0553 04/27/20  2230   GLU 74 93 110*    144 141   K 3.9 4.3 4.8   * 114* 112*   CO2 23 22 22   BUN 41* 56* 56*   CREA 3.38* 4.16* 4.32*   CA 8.3*  8.3* 8.2* 7.7*   AGAP 8 8 7   BUCR 12 13 13   AP 66 87  --    TP 6.4 6.7  --    ALB 3.5 2.2*  --    GLOB 2.9 4.5*  --    AGRAT 1.2 0.5*  --       CBC w/Diff Recent Labs     04/29/20  1156 04/29/20 0315 04/28/20  0553   WBC 4.0* 4.9 6.0   RBC 4.21* 4.29* 4.87   HGB 9.7* 10.0* 11.2*   HCT 30.5* 31.0* 35.0*    159 175   GRANS 49 40 47   LYMPH 33 46 38   EOS 4 5 5      Cardiac Enzymes Recent Labs     04/28/20  1748 04/28/20  1118    150   CKND1 1.0 1.0      Coagulation Recent Labs     04/29/20  0315 04/28/20  1410   APTT 102.9* 80.8*       Lipid Panel Lab Results   Component Value Date/Time    Cholesterol, total 142 02/25/2013 09:38 AM    HDL Cholesterol 66 (H) 02/25/2013 09:38 AM    LDL,Direct 124 (H) 06/22/2011 02:05 AM    LDL, calculated 60.2 02/25/2013 09:38 AM    VLDL, calculated 15.8 02/25/2013 09:38 AM    Triglyceride 79 02/25/2013 09:38 AM    CHOL/HDL Ratio 2.2 02/25/2013 09:38 AM      BNP No results for input(s): BNPP in the last 72 hours.    Liver Enzymes Recent Labs     04/29/20  0315   TP 6.4   ALB 3.5   AP 66   SGOT 22      Thyroid Studies No results found for: T4, T3U, TSH, TSHEXT, TSHEXT     Procedures/imaging: see electronic medical records for all procedures/Xrays and details which were not copied into this note but were reviewed prior to creation of Plan    Xr Chest Port    Result Date: 4/28/2020  --------------------------------------------------------------------------- <<<<<<<<<           [facility]           >>>>>>>>> --------------------------------------------------------------------------- CLINICAL HISTORY:  Shortness of breath. COMPARISON EXAMINATIONS:  May 14, 2012. ---  SINGLE FRONTAL VIEW OF THE CHEST  --- The lungs and pleural spaces are clear. The mediastinum is unremarkable in appearance. No significant osseous abnormalities are identified.  --------------    Impression: -------------- No active cardiopulmonary disease.       Ashley Lancaster MD

## 2020-04-29 NOTE — PROGRESS NOTES
Problem: Falls - Risk of  Goal: *Absence of Falls  Description: Document Alton Aquino Fall Risk and appropriate interventions in the flowsheet.   Outcome: Progressing Towards Goal  Note: Fall Risk Interventions:  Mobility Interventions: Assess mobility with egress test         Medication Interventions: Teach patient to arise slowly                   Problem: Patient Education: Go to Patient Education Activity  Goal: Patient/Family Education  Outcome: Progressing Towards Goal     Problem: Patient Education: Go to Patient Education Activity  Goal: Patient/Family Education  Outcome: Progressing Towards Goal     Problem: Acute Renal Failure: Day 1  Goal: Off Pathway (Use only if patient is Off Pathway)  Outcome: Progressing Towards Goal  Goal: Activity/Safety  Outcome: Progressing Towards Goal  Goal: Consults, if ordered  Outcome: Progressing Towards Goal  Goal: Diagnostic Test/Procedures  Outcome: Progressing Towards Goal  Goal: Nutrition/Diet  Outcome: Progressing Towards Goal  Goal: Discharge Planning  Outcome: Progressing Towards Goal  Goal: Medications  Outcome: Progressing Towards Goal  Goal: Respiratory  Outcome: Progressing Towards Goal  Goal: Treatments/Interventions/Procedures  Outcome: Progressing Towards Goal  Goal: Psychosocial  Outcome: Progressing Towards Goal  Goal: *Optimal pain control at patient's stated goal  Outcome: Progressing Towards Goal  Goal: *Urinary output within identified parameters  Outcome: Progressing Towards Goal  Goal: *Hemodynamically stable  Outcome: Progressing Towards Goal  Goal: *Tolerating diet  Outcome: Progressing Towards Goal

## 2020-04-30 LAB
ALBUMIN SERPL-MCNC: 4 G/DL (ref 3.4–5)
ALBUMIN/GLOB SERPL: 1.5 {RATIO} (ref 0.8–1.7)
ALP SERPL-CCNC: 53 U/L (ref 45–117)
ALT SERPL-CCNC: 32 U/L (ref 16–61)
ANA TITR SER IF: NEGATIVE {TITER}
ANION GAP SERPL CALC-SCNC: 7 MMOL/L (ref 3–18)
APTT PPP: 49.9 SEC (ref 23–36.4)
AST SERPL-CCNC: 24 U/L (ref 10–38)
BASOPHILS # BLD: 0 K/UL (ref 0–0.1)
BASOPHILS NFR BLD: 0 % (ref 0–2)
BILIRUB SERPL-MCNC: 0.4 MG/DL (ref 0.2–1)
BUN SERPL-MCNC: 39 MG/DL (ref 7–18)
BUN/CREAT SERPL: 12 (ref 12–20)
C3 SERPL-MCNC: 104 MG/DL (ref 82–167)
C4 SERPL-MCNC: 32 MG/DL (ref 14–44)
CALCIUM SERPL-MCNC: 8.2 MG/DL (ref 8.5–10.1)
CHLORIDE SERPL-SCNC: 117 MMOL/L (ref 100–111)
CO2 SERPL-SCNC: 21 MMOL/L (ref 21–32)
CREAT SERPL-MCNC: 3.21 MG/DL (ref 0.6–1.3)
DIFFERENTIAL METHOD BLD: ABNORMAL
EOSINOPHIL # BLD: 0.2 K/UL (ref 0–0.4)
EOSINOPHIL NFR BLD: 4 % (ref 0–5)
ERYTHROCYTE [DISTWIDTH] IN BLOOD BY AUTOMATED COUNT: 14.8 % (ref 11.6–14.5)
ERYTHROCYTE [DISTWIDTH] IN BLOOD BY AUTOMATED COUNT: 14.8 % (ref 11.6–14.5)
GLOBULIN SER CALC-MCNC: 2.7 G/DL (ref 2–4)
GLUCOSE SERPL-MCNC: 79 MG/DL (ref 74–99)
HCT VFR BLD AUTO: 28.3 % (ref 36–48)
HCT VFR BLD AUTO: 28.6 % (ref 36–48)
HGB BLD-MCNC: 9 G/DL (ref 13–16)
HGB BLD-MCNC: 9.1 G/DL (ref 13–16)
LYMPHOCYTES # BLD: 1.7 K/UL (ref 0.9–3.6)
LYMPHOCYTES NFR BLD: 40 % (ref 21–52)
MCH RBC QN AUTO: 22.8 PG (ref 24–34)
MCH RBC QN AUTO: 23.1 PG (ref 24–34)
MCHC RBC AUTO-ENTMCNC: 31.8 G/DL (ref 31–37)
MCHC RBC AUTO-ENTMCNC: 31.8 G/DL (ref 31–37)
MCV RBC AUTO: 71.8 FL (ref 74–97)
MCV RBC AUTO: 72.6 FL (ref 74–97)
MONOCYTES # BLD: 0.4 K/UL (ref 0.05–1.2)
MONOCYTES NFR BLD: 10 % (ref 3–10)
NEUTS SEG # BLD: 1.9 K/UL (ref 1.8–8)
NEUTS SEG NFR BLD: 46 % (ref 40–73)
PLATELET # BLD AUTO: 129 K/UL (ref 135–420)
PLATELET # BLD AUTO: 155 K/UL (ref 135–420)
PMV BLD AUTO: 10.9 FL (ref 9.2–11.8)
PMV BLD AUTO: 9.1 FL (ref 9.2–11.8)
POTASSIUM SERPL-SCNC: 3.9 MMOL/L (ref 3.5–5.5)
PROT SERPL-MCNC: 6.7 G/DL (ref 6.4–8.2)
RBC # BLD AUTO: 3.94 M/UL (ref 4.7–5.5)
RBC # BLD AUTO: 3.94 M/UL (ref 4.7–5.5)
SODIUM SERPL-SCNC: 145 MMOL/L (ref 136–145)
WBC # BLD AUTO: 4.2 K/UL (ref 4.6–13.2)
WBC # BLD AUTO: 4.3 K/UL (ref 4.6–13.2)

## 2020-04-30 PROCEDURE — 99218 HC RM OBSERVATION: CPT

## 2020-04-30 PROCEDURE — 74011250636 HC RX REV CODE- 250/636: Performed by: INTERNAL MEDICINE

## 2020-04-30 PROCEDURE — 74011250636 HC RX REV CODE- 250/636: Performed by: FAMILY MEDICINE

## 2020-04-30 PROCEDURE — 85027 COMPLETE CBC AUTOMATED: CPT

## 2020-04-30 PROCEDURE — P9047 ALBUMIN (HUMAN), 25%, 50ML: HCPCS | Performed by: FAMILY MEDICINE

## 2020-04-30 PROCEDURE — 85730 THROMBOPLASTIN TIME PARTIAL: CPT

## 2020-04-30 PROCEDURE — 74011250637 HC RX REV CODE- 250/637: Performed by: FAMILY MEDICINE

## 2020-04-30 PROCEDURE — 96372 THER/PROPH/DIAG INJ SC/IM: CPT

## 2020-04-30 PROCEDURE — 65660000000 HC RM CCU STEPDOWN

## 2020-04-30 PROCEDURE — 80053 COMPREHEN METABOLIC PANEL: CPT

## 2020-04-30 PROCEDURE — 74011250637 HC RX REV CODE- 250/637: Performed by: INTERNAL MEDICINE

## 2020-04-30 PROCEDURE — 36415 COLL VENOUS BLD VENIPUNCTURE: CPT

## 2020-04-30 PROCEDURE — 96376 TX/PRO/DX INJ SAME DRUG ADON: CPT

## 2020-04-30 PROCEDURE — 85025 COMPLETE CBC W/AUTO DIFF WBC: CPT

## 2020-04-30 RX ADMIN — ALBUMIN (HUMAN) 25 G: 0.25 INJECTION, SOLUTION INTRAVENOUS at 05:16

## 2020-04-30 RX ADMIN — CARVEDILOL 25 MG: 12.5 TABLET, FILM COATED ORAL at 09:10

## 2020-04-30 RX ADMIN — HEPARIN SODIUM 5000 UNITS: 5000 INJECTION INTRAVENOUS; SUBCUTANEOUS at 09:09

## 2020-04-30 RX ADMIN — SODIUM CHLORIDE 75 ML/HR: 900 INJECTION, SOLUTION INTRAVENOUS at 05:15

## 2020-04-30 RX ADMIN — LEVOTHYROXINE SODIUM 25 MCG: 0.03 TABLET ORAL at 06:40

## 2020-04-30 RX ADMIN — CARVEDILOL 25 MG: 12.5 TABLET, FILM COATED ORAL at 16:10

## 2020-04-30 RX ADMIN — HEPARIN SODIUM 5000 UNITS: 5000 INJECTION INTRAVENOUS; SUBCUTANEOUS at 16:10

## 2020-04-30 RX ADMIN — ISOSORBIDE MONONITRATE 30 MG: 30 TABLET, EXTENDED RELEASE ORAL at 09:10

## 2020-04-30 RX ADMIN — ASPIRIN 325 MG ORAL TABLET 325 MG: 325 PILL ORAL at 09:10

## 2020-04-30 RX ADMIN — PRAVASTATIN SODIUM 20 MG: 20 TABLET ORAL at 22:30

## 2020-04-30 RX ADMIN — AMLODIPINE BESYLATE 5 MG: 5 TABLET ORAL at 09:10

## 2020-04-30 RX ADMIN — Medication 10 ML: at 16:12

## 2020-04-30 RX ADMIN — HEPARIN SODIUM 5000 UNITS: 5000 INJECTION INTRAVENOUS; SUBCUTANEOUS at 22:30

## 2020-04-30 RX ADMIN — HYDROCHLOROTHIAZIDE 12.5 MG: 25 TABLET ORAL at 09:10

## 2020-04-30 RX ADMIN — CLOPIDOGREL BISULFATE 75 MG: 75 TABLET ORAL at 09:10

## 2020-04-30 RX ADMIN — Medication 10 ML: at 05:17

## 2020-04-30 RX ADMIN — HEPARIN SODIUM 5000 UNITS: 5000 INJECTION INTRAVENOUS; SUBCUTANEOUS at 00:46

## 2020-04-30 NOTE — PROGRESS NOTES
Hospitalist Progress Note-critical care note     Patient: Janice Sanchez MRN: [id number]  CSN: [id number]    YOB: 1965  Age: 53 y.o. Sex: male    DOA: 4/27/2020 LOS:  LOS: 2 days            Chief complaint: hypoalbuminemia , pvd. Htn, chest pain, cad   Assessment/Plan         Hospital Problems  Date Reviewed: 4/28/2020          Codes Class Noted POA    Hypoalbuminemia ICD-10-CM: E88.09  ICD-9-CM: 273.8  4/29/2020 Unknown        * (Principal) Acute on chronic renal failure ([facility] ICD-10-CM: N17.9, N18.9  ICD-9-CM: 584.9, 585.9  4/28/2020 Yes        Peripheral vascular disease ([facility] ICD-10-CM: I73.9  ICD-9-CM: 443.9  3/24/2015 Yes        Hypertension ICD-10-CM: I10  ICD-9-CM: 401.9  3/24/2015 Yes        Chest pain, unspecified ICD-10-CM: R07.9  ICD-9-CM: 786.50  10/15/2013 Yes        CAD (coronary artery disease), native coronary artery ICD-10-CM: I25.10  ICD-9-CM: 414.01  5/12/2012 Yes            conrado on ckd3  Cr was 3.21-base line around 2.2  Got improving, renal on board  Continue iv hydration   Renal dose meds and avoid nsaids and iv contrast     Chest pain with cad  No chest pain overnight  Stress test abnormal -medical treatment per cardiologist right now possible invasive testing if renal function is better   On aspirin , plavix , imdur and pravastatin     htn   Will increase norvasc for better control, continue other regimen     Pad   Stable     Hypoalbuminemia   Resolved and d.c albumin infusion     Subjective: no chest pain overnight ,feel fine     Rn: no acute issue       Disposition :1-2 days if cleared per renal and cards   Review of systems:    General: No fevers or chills. Cardiovascular: No chest pain or pressure. No palpitations. Pulmonary: No shortness of breath. Gastrointestinal: No nausea, vomiting.      Vital signs/Intake and Output:  Visit Vitals  /79   Pulse 65   Temp 98 °F (36.7 °C)   Resp 16   Ht 5' 5\" (1.651 m)   Wt 76.4 kg (168 lb 8 oz)   SpO2 98%   BMI 28.04 kg/m² Current Shift:  04/30 0701 - 04/30 1900  In: -   Out: 500 [Urine:500]  Last three shifts:  04/28 1901 - 04/30 0700  In: 2043.8 [P.O.:960; I.V.:1083.8]  Out: 800 [Urine:800]    Physical Exam:  General: WD, WN. Alert, cooperative, no acute distress    HEENT: NC, Atraumatic. PERRLA, anicteric sclerae. Lungs: CTA Bilaterally. No Wheezing/Rhonchi/Rales. Heart:  Regular  rhythm,  No murmur, No Rubs, No Gallops  Abdomen: Soft, Non distended, Non tender. +Bowel sounds,   Extremities: No c/c/e  Psych:   Not anxious or agitated. Neurologic:  No acute neurological deficit. Labs: Results:       Chemistry Recent Labs     04/30/20  0333 04/29/20  1155 04/29/20  0315 04/28/20  0553   GLU 79 106* 74 93    144 145 144   K 3.9 4.4 3.9 4.3   * 117* 114* 114*   CO2 21 20* 23 22   BUN 39* 38* 41* 56*   CREA 3.21* 3.34* 3.38* 4.16*   CA 8.2* 8.1* 8.3*  8.3* 8.2*   AGAP 7 7 8 8   BUCR 12 11* 12 13   AP 53  --  66 87   TP 6.7  --  6.4 6.7   ALB 4.0 3.3* 3.5 2.2*   GLOB 2.7  --  2.9 4.5*   AGRAT 1.5  --  1.2 0.5*      CBC w/Diff Recent Labs     04/30/20  0333 04/29/20  1156 04/29/20  0315   WBC 4.3* 4.0* 4.9   RBC 3.94* 4.21* 4.29*   HGB 9.0* 9.7* 10.0*   HCT 28.3* 30.5* 31.0*   * 155 159   GRANS 46 49 40   LYMPH 40 33 46   EOS 4 4 5      Cardiac Enzymes Recent Labs     04/28/20  1748 04/28/20  1118    150   CKND1 1.0 1.0      Coagulation Recent Labs     04/30/20  0333 04/29/20  0315   APTT 49.9* 102.9*       Lipid Panel Lab Results   Component Value Date/Time    Cholesterol, total 142 02/25/2013 09:38 AM    HDL Cholesterol 66 (H) 02/25/2013 09:38 AM    LDL,Direct 124 (H) 06/22/2011 02:05 AM    LDL, calculated 60.2 02/25/2013 09:38 AM    VLDL, calculated 15.8 02/25/2013 09:38 AM    Triglyceride 79 02/25/2013 09:38 AM    CHOL/HDL Ratio 2.2 02/25/2013 09:38 AM      BNP No results for input(s): BNPP in the last 72 hours.    Liver Enzymes Recent Labs     04/30/20 0333   TP 6.7   ALB 4.0   AP 53 SGOT 24      Thyroid Studies No results found for: T4, T3U, TSH, TSHEXT, TSHEXT     Procedures/imaging: see electronic medical records for all procedures/Xrays and details which were not copied into this note but were reviewed prior to creation of Plan    Xr Chest Port    Result Date: 4/28/2020  --------------------------------------------------------------------------- <<<<<<<<<           [facility]           >>>>>>>>> --------------------------------------------------------------------------- CLINICAL HISTORY:  Shortness of breath. COMPARISON EXAMINATIONS:  May 14, 2012. ---  SINGLE FRONTAL VIEW OF THE CHEST  --- The lungs and pleural spaces are clear. The mediastinum is unremarkable in appearance. No significant osseous abnormalities are identified.  --------------    Impression: -------------- No active cardiopulmonary disease.       Romana Muckle, MD

## 2020-04-30 NOTE — PROGRESS NOTES
Problem: Falls - Risk of  Goal: *Absence of Falls  Description: Document Evelyn Penn Fall Risk and appropriate interventions in the flowsheet.   Outcome: Progressing Towards Goal  Note: Fall Risk Interventions:  Mobility Interventions: Assess mobility with egress test         Medication Interventions: Evaluate medications/consider consulting pharmacy                   Problem: Patient Education: Go to Patient Education Activity  Goal: Patient/Family Education  Outcome: Progressing Towards Goal     Problem: Patient Education: Go to Patient Education Activity  Goal: Patient/Family Education  Outcome: Progressing Towards Goal     Problem: Acute Renal Failure: Day 1  Goal: Off Pathway (Use only if patient is Off Pathway)  Outcome: Progressing Towards Goal  Goal: Activity/Safety  Outcome: Progressing Towards Goal  Goal: Consults, if ordered  Outcome: Progressing Towards Goal  Goal: Diagnostic Test/Procedures  Outcome: Progressing Towards Goal  Goal: Nutrition/Diet  Outcome: Progressing Towards Goal  Goal: Discharge Planning  Outcome: Progressing Towards Goal  Goal: Medications  Outcome: Progressing Towards Goal  Goal: Respiratory  Outcome: Progressing Towards Goal  Goal: Treatments/Interventions/Procedures  Outcome: Progressing Towards Goal  Goal: Psychosocial  Outcome: Progressing Towards Goal  Goal: *Optimal pain control at patient's stated goal  Outcome: Progressing Towards Goal  Goal: *Urinary output within identified parameters  Outcome: Progressing Towards Goal  Goal: *Hemodynamically stable  Outcome: Progressing Towards Goal  Goal: *Tolerating diet  Outcome: Progressing Towards Goal

## 2020-04-30 NOTE — PROGRESS NOTES
Transition of care: anticipate home when medically cleared  Did not enter room due to covid restrictions. Patient lives alone  Has 3 sisters who are available to assist him. Reports he has medicaid for insurance. He as Dr Carlota Kevin for pcp. Patient is not ready for d/c per Dr. Krissy Ken.  Patient states his sister will drive him home when medically cleared  Cm will continue to follow  Patient denies using any DME. Cms will assist with d/c when medically cleared  Care Management Interventions  PCP Verified by CM:  Yes  Transition of Care Consult (CM Consult): Discharge Planning  Current Support Network: Relative's Home  Confirm Follow Up Transport: Family  The Plan for Transition of Care is Related to the Following Treatment Goals : d/c home family when medically cleared  The Patient and/or Patient Representative was Provided with a Choice of Provider and Agrees with the Discharge Plan?: Yes  Freedom of Choice List was Provided with Basic Dialogue that Supports the Patient's Individualized Plan of Care/Goals, Treatment Preferences and Shares the Quality Data Associated with the Providers?: Yes  Discharge Location  Discharge Placement: Home with family assistance

## 2020-04-30 NOTE — PROGRESS NOTES
[facility] care of the patient from [facility] (offgoing Nurse). Patient is alert and oriented. Pt denies any pain or discomfort at this moment. bed in low position, call bell within reach. 1400 Bedside and Verbal shift change report given to Clearance Romberg  RN (oncoming nurse) by Eloy Ibrahim RN (offgoing nurse). Report included the following information SBAR, Kardex, MAR, Recent Results and Cardiac Rhythm .

## 2020-04-30 NOTE — PROGRESS NOTES
No significant event all night. No complains of pain or nausea and vomiting. Slept mostly of the shift with safety and comfort measures inplace. 04/30/20 0242   Vital Signs   Temp 98.2 °F (36.8 °C)   Temp Source Oral   Pulse (Heart Rate) 80   Heart Rate Source Monitor   Resp Rate 16   O2 Sat (%) 100 %   Level of Consciousness Alert   /61   MAP (Calculated) 85   MEWS Score 1     Bedside shift change report given to Sebastian Ramos RN (oncoming nurse) by Aida Scanlon RN (offgoing nurse). Report included the following information SBAR, Kardex, Intake/Output, Recent Results, Cardiac Rhythm NSR, Alarm Parameters  and Quality Measures.

## 2020-04-30 NOTE — ROUTINE PROCESS
B Verbal shift [facility] by Mick Agrawal RN. Report included the following information SBAR, Kardex, and MAR.

## 2020-04-30 NOTE — PROGRESS NOTES
2000 - Received report from Shirley Covington RN (offgoing nurse). Assumed care of PT. PT assessed. Bedside and Verbal shift change report given to Mery Field RN (oncoming nurse) by Tyler Khan RN (offgoing nurse) successfully. Report included the following information SBAR, Kardex, Procedure Summary, Intake/Output, MAR, Accordion, Recent Results and Med Rec Status.

## 2020-04-30 NOTE — PROGRESS NOTES
Nephrology Inpatient Progress note    Subjective:     Bill Montalvo is a 53 y.o. male with a past medical history significant for HTN, CAD, CVA, Chronic Hepatitis C, CKD who presented to the ED with c/o Palpitation, Dyspnea on exertion and Right Calf pain/swelling. Patient admitted and started on IV Heparin. Labs on admission revealed a S Cr 4.3 and repeat today 4. 16. Available labs from [place] records reveal S Cr of 2.7 on 2/11/20, 2.39 back on 8/13/19. Work up thus far:  No evidence of DVT on Duplex study of lower extremities. Arterial duplex study showed no evidence of significant peripheral arterial occlusive disease in both lower extremities. Nuclear Stress test Abnormal. ECHO EF 55%. Nephrology was consulted for further evaluation and management of Renal Failure. Patient denies use of NSAIDs or herbal supplements. No h/o Kidney stones, Prostatic disease or Kidney infections. Pt remains stable overnight. /day and cr slightly down to 3.2.     Admit Date: 4/27/2020  Principal Problem:    Acute on chronic renal failure (HCC) (4/28/2020)    Active Problems:    CAD (coronary artery disease), native coronary artery (5/12/2012)      Chest pain, unspecified (10/15/2013)      Peripheral vascular disease ([facility] [address] (3/24/2015)      Hypertension (3/24/2015)      Hypoalbuminemia (4/29/2020)      Current Facility-Administered Medications   Medication Dose Route Frequency    heparin (porcine) injection 5,000 Units  5,000 Units SubCUTAneous Q8H    amLODIPine (NORVASC) tablet 5 mg  5 mg Oral DAILY    aspirin tablet 325 mg  325 mg Oral DAILY    carvediloL (COREG) tablet 25 mg  25 mg Oral BID WITH MEALS    clopidogreL (PLAVIX) tablet 75 mg  75 mg Oral DAILY    hydroCHLOROthiazide (HYDRODIURIL) tablet 12.5 mg  12.5 mg Oral DAILY    levothyroxine (SYNTHROID) tablet 25 mcg  25 mcg Oral ACB    pravastatin (PRAVACHOL) tablet 20 mg  20 mg Oral QHS    sodium chloride (NS) flush 5-40 mL  5-40 mL IntraVENous Q8H    sodium chloride (NS) flush 5-40 mL  5-40 mL IntraVENous PRN    ondansetron (ZOFRAN) injection 4 mg  4 mg IntraVENous Q4H PRN    albumin human 25% (BUMINATE) solution 25 g  25 g IntraVENous Q6H    0.9% sodium chloride infusion  75 mL/hr IntraVENous CONTINUOUS    isosorbide mononitrate ER (IMDUR) tablet 30 mg  30 mg Oral DAILY         Allergy:   No Known Allergies     Objective:     Visit Vitals  /79   Pulse 65   Temp 98 °F (36.7 °C)   Resp 16   Ht 5' 5\" (1.651 m)   Wt 76.4 kg (168 lb 8 oz)   SpO2 98%   BMI 28.04 kg/m²         Intake/Output Summary (Last 24 hours) at 4/30/2020 1002  Last data filed at 4/30/2020 0921  Gross per 24 hour   Intake 2043.75 ml   Output 1300 ml   Net 743.75 ml       Physical Exam:       General: No acute distress   HENT: Atraumatic and normocephalic   Eyes: Normal conjunctiva   Neck: Supple with no mass   Cardiovascular: Normal S1 & S2, no m/r/g   Pulmonary/Chest Wall: Clear to auscultation bilaterally   Abdominal: Soft and non-tender   Musculoskeletal: No edema   Neurological: No focal deficits     Data Review:  Lab Results   Component Value Date/Time    Sodium 145 04/30/2020 03:33 AM    Potassium 3.9 04/30/2020 03:33 AM    Chloride 117 (H) 04/30/2020 03:33 AM    CO2 21 04/30/2020 03:33 AM    Anion gap 7 04/30/2020 03:33 AM    Glucose 79 04/30/2020 03:33 AM    BUN 39 (H) 04/30/2020 03:33 AM    Creatinine 3.21 (H) 04/30/2020 03:33 AM    BUN/Creatinine ratio 12 04/30/2020 03:33 AM    GFR est AA 24 (L) 04/30/2020 03:33 AM    GFR est non-AA 20 (L) 04/30/2020 03:33 AM    Calcium 8.2 (L) 04/30/2020 03:33 AM     Lab Results   Component Value Date/Time    WBC 4.3 (L) 04/30/2020 03:33 AM    HGB 9.0 (L) 04/30/2020 03:33 AM    HCT 28.3 (L) 04/30/2020 03:33 AM    PLATELET 560 (L) 72/81/9719 03:33 AM    MCV 71.8 (L) 04/30/2020 03:33 AM     Lab Results   Component Value Date/Time    Calcium 8.2 (L) 04/30/2020 03:33 AM    Phosphorus 2.8 04/29/2020 11:55 AM     No results found for: IRON, FE, TIBC, IBCT, PSAT, FERR  No results found for: FERR       Impression:      1. Acute Kidney Injury on CKD vs Progression of CKD. Possibly has some Prerenal component. Patient likely has underlying CKD sec to Hypertensive Nephrosclerosis. Possible Chronic glomerular disease sec to chronic Hep C.  S Cr improving with IV Hydration, S Cr slowly improving  2. HTN  3. Proteinuria, Prot/Cr ratio 5.0, Nephrotic range possibly sec to hypertensive Nephrosclerosis, possible Hep C related glomerulopathy, possible  FSGS. Rh Factor neg. 4. CAD s/p PTCA  5. PAD  6. Anemia likely sec to CKD  7. Chronic Hep C  8. Hypoalbuminemia     Plan:      1. Cut back on IV Fluid NS to 50ml/hr  2. Cont to hold ARB Cozaar for now. Continue Amlodipine and Carvedilol  3. Monitor Serum chemistry and UO  4. Still pending  C3, C4, CH50,  LIANA, SIFE and Cryoglobulins  5. Pending iPTH  6. Recommend evaluation and treatment of Chronic Hep C  By Hepatologist  7. Recommend Nephrology Follow up after discharge. 8. Cardiac work up in progress. Would avoid IV contrast dye for now due to BRIANNA. Would like to see S Cr return to recent baseline if possible before IV contrast exposure.      Marshell Hashimoto, MD [employer]  [phone]

## 2020-04-30 NOTE — PROGRESS NOTES
Problem: Falls - Risk of  Goal: *Absence of Falls  Description: Document Richard Loja Fall Risk and appropriate interventions in the flowsheet.   4/30/2020 1325 by Christiane Finney RN  Outcome: Progressing Towards Goal  Note: Fall Risk Interventions:  Mobility Interventions: Assess mobility with egress test         Medication Interventions: Evaluate medications/consider consulting pharmacy                4/30/2020 1009 by Christiane Finney RN  Outcome: Progressing Towards Goal  Note: Fall Risk Interventions:  Mobility Interventions: Assess mobility with egress test         Medication Interventions: Evaluate medications/consider consulting pharmacy                   Problem: Patient Education: Go to Patient Education Activity  Goal: Patient/Family Education  4/30/2020 1325 by Christiaen Finney RN  Outcome: Progressing Towards Goal  4/30/2020 1009 by Christiane Finney RN  Outcome: Progressing Towards Goal     Problem: Patient Education: Go to Patient Education Activity  Goal: Patient/Family Education  4/30/2020 1325 by Christiane Finney RN  Outcome: Progressing Towards Goal  4/30/2020 1009 by Christiane Finney RN  Outcome: Progressing Towards Goal     Problem: Acute Renal Failure: Day 1  Goal: Off Pathway (Use only if patient is Off Pathway)  4/30/2020 1325 by Christiane Finney RN  Outcome: Progressing Towards Goal  4/30/2020 1009 by Christiane Finney RN  Outcome: Progressing Towards Goal  Goal: Activity/Safety  4/30/2020 1325 by Christiane Finney RN  Outcome: Progressing Towards Goal  4/30/2020 1009 by Christiane Finney RN  Outcome: Progressing Towards Goal  Goal: Consults, if ordered  4/30/2020 1325 by Christiane Finney RN  Outcome: Progressing Towards Goal  4/30/2020 1009 by Christiane Finney RN  Outcome: Progressing Towards Goal  Goal: Diagnostic Test/Procedures  4/30/2020 1325 by Chritsiane Finney RN  Outcome: Progressing Towards Goal  4/30/2020 1009 by Christiane Finney RN  Outcome: Progressing Towards Goal  Goal: Nutrition/Diet  4/30/2020 1325 by Christiane Finney RN  Outcome: Progressing Towards Goal  4/30/2020 1009 by Maryann Fuentes RN  Outcome: Progressing Towards Goal  Goal: Discharge Planning  4/30/2020 1325 by Maryann Fuentes RN  Outcome: Progressing Towards Goal  4/30/2020 1009 by Maryann Fuentes RN  Outcome: Progressing Towards Goal  Goal: Medications  4/30/2020 1325 by Maryann Fuentes, RN  Outcome: Progressing Towards Goal  4/30/2020 1009 by Maryann Fuentes RN  Outcome: Progressing Towards Goal  Goal: Respiratory  4/30/2020 1325 by Maryann Fuentes, RN  Outcome: Progressing Towards Goal  4/30/2020 1009 by Maryann Fuentes RN  Outcome: Progressing Towards Goal  Goal: Treatments/Interventions/Procedures  4/30/2020 1325 by Maryann Fuentes, RN  Outcome: Progressing Towards Goal  4/30/2020 1009 by Maryann Fuentes RN  Outcome: Progressing Towards Goal  Goal: Psychosocial  4/30/2020 1325 by Maryann Fuentes, RN  Outcome: Progressing Towards Goal  4/30/2020 1009 by Maryann Fuentes RN  Outcome: Progressing Towards Goal  Goal: *Optimal pain control at patient's stated goal  4/30/2020 1325 by Maryann Fuentes, RN  Outcome: Progressing Towards Goal  4/30/2020 1009 by Maryann Fuentes RN  Outcome: Progressing Towards Goal  Goal: *Urinary output within identified parameters  4/30/2020 1325 by Maryann Fuentes, RN  Outcome: Progressing Towards Goal  4/30/2020 1009 by Maryann Fuenets RN  Outcome: Progressing Towards Goal  Goal: *Hemodynamically stable  4/30/2020 1325 by Maryann Fuentes, RN  Outcome: Progressing Towards Goal  4/30/2020 1009 by Maryann Fuentes RN  Outcome: Progressing Towards Goal  Goal: *Tolerating diet  4/30/2020 1325 by Maryann Fuentes, RN  Outcome: Progressing Towards Goal  4/30/2020 1009 by Maryann Fuentes RN  Outcome: Progressing Towards Goal

## 2020-04-30 NOTE — PROGRESS NOTES
Cardiology Progress Note        Patient: Mario Carson        Sex: male          DOA: 4/27/2020  YOB: 1965      Age:  53 y.o.        LOS:  LOS: 2 days   Assessment/Plan     Principal Problem:    Acute on chronic renal failure ([facility] [address] (4/28/2020)    Active Problems:    CAD (coronary artery disease), native coronary artery (5/12/2012)      Chest pain, unspecified (10/15/2013)      Peripheral vascular disease ([facility] [address] (3/24/2015)      Hypertension (3/24/2015)      Hypoalbuminemia (4/29/2020)        Plan:    SOB  CAD  abnormal stress test    Pt denied CP and SOB, rhythm is stable will continue with current meds, No plan to cath during this hospitalization at this point,  No evidence of ACS  Cath procedure can be considered in out pt setting when renal function is stable. Discussed with patient. Subjective:    cc:  SOB  CAD  abnormal stress test        REVIEW OF SYSTEMS:     General: No fevers or chills. Cardiovascular: No chest pain or pressure. No palpitations. No ankle swelling  Pulmonary: No SOB, orthopnea, PND  Gastrointestinal: No nausea, vomiting or diarrhea      Objective:      Visit Vitals  /68   Pulse 65   Temp 97.7 °F (36.5 °C)   Resp 16   Ht 5' 5\" (1.651 m)   Wt 76.4 kg (168 lb 8 oz)   SpO2 100%   BMI 28.04 kg/m²     Body mass index is 28.04 kg/m². Physical Exam:  General Appearance: Comfortable, not using accessory muscles of respiration. NECK: No JVD, no thyroidomeglay. LUNGS: Clear bilaterally. HEART: S1+S2 audible,    ABD: Non-tender, BS Audible    EXT: No edema, and no cysnosis. VASCULAR EXAM: Pulses are intact. PSYCHIATRIC EXAM: Mood is appropriate.     Medication:  Current Facility-Administered Medications   Medication Dose Route Frequency    [START ON 5/1/2020] amLODIPine (NORVASC) tablet 7.5 mg  7.5 mg Oral DAILY    heparin (porcine) injection 5,000 Units  5,000 Units SubCUTAneous Q8H    aspirin tablet 325 mg  325 mg Oral DAILY    carvediloL (COREG) tablet 25 mg  25 mg Oral BID WITH MEALS    clopidogreL (PLAVIX) tablet 75 mg  75 mg Oral DAILY    hydroCHLOROthiazide (HYDRODIURIL) tablet 12.5 mg  12.5 mg Oral DAILY    levothyroxine (SYNTHROID) tablet 25 mcg  25 mcg Oral ACB    pravastatin (PRAVACHOL) tablet 20 mg  20 mg Oral QHS    sodium chloride (NS) flush 5-40 mL  5-40 mL IntraVENous Q8H    sodium chloride (NS) flush 5-40 mL  5-40 mL IntraVENous PRN    ondansetron (ZOFRAN) injection 4 mg  4 mg IntraVENous Q4H PRN    0.9% sodium chloride infusion  50 mL/hr IntraVENous CONTINUOUS    isosorbide mononitrate ER (IMDUR) tablet 30 mg  30 mg Oral DAILY               Lab/Data Reviewed:  Procedures/imaging: see electronic medical records for all procedures/Xrays   and details which were not copied into this note but were reviewed prior to creation of Plan       All lab results for the last 24 hours reviewed.      Recent Labs     04/30/20  1225 04/30/20  0333 04/29/20  1156   WBC 4.2* 4.3* 4.0*   HGB 9.1* 9.0* 9.7*   HCT 28.6* 28.3* 30.5*    129* 155     Recent Labs     04/30/20  0333 04/29/20  1155 04/29/20  0315    144 145   K 3.9 4.4 3.9   * 117* 114*   CO2 21 20* 23   GLU 79 106* 74   BUN 39* 38* 41*   CREA 3.21* 3.34* 3.38*   CA 8.2* 8.1* 8.3*  8.3*       Signed By: Deborah Sherman MD     April 30, 2020

## 2020-05-01 VITALS
HEIGHT: 65 IN | RESPIRATION RATE: 18 BRPM | HEART RATE: 57 BPM | OXYGEN SATURATION: 100 % | BODY MASS INDEX: 28.32 KG/M2 | WEIGHT: 170 LBS | TEMPERATURE: 98 F | SYSTOLIC BLOOD PRESSURE: 144 MMHG | DIASTOLIC BLOOD PRESSURE: 83 MMHG

## 2020-05-01 LAB
ALBUMIN SERPL ELPH-MCNC: 3.7 G/DL (ref 2.9–4.4)
ALBUMIN SERPL-MCNC: 3.6 G/DL (ref 3.4–5)
ALBUMIN/GLOB SERPL: 1.5 {RATIO} (ref 0.7–1.7)
ALPHA1 GLOB SERPL ELPH-MCNC: 0.2 G/DL (ref 0–0.4)
ALPHA2 GLOB SERPL ELPH-MCNC: 0.8 G/DL (ref 0.4–1)
ANION GAP SERPL CALC-SCNC: 8 MMOL/L (ref 3–18)
B-GLOBULIN SERPL ELPH-MCNC: 0.6 G/DL (ref 0.7–1.3)
BASOPHILS # BLD: 0 K/UL (ref 0–0.1)
BASOPHILS NFR BLD: 1 % (ref 0–2)
BUN SERPL-MCNC: 39 MG/DL (ref 7–18)
BUN/CREAT SERPL: 12 (ref 12–20)
CALCIUM SERPL-MCNC: 8.1 MG/DL (ref 8.5–10.1)
CHLORIDE SERPL-SCNC: 116 MMOL/L (ref 100–111)
CO2 SERPL-SCNC: 20 MMOL/L (ref 21–32)
CREAT SERPL-MCNC: 3.2 MG/DL (ref 0.6–1.3)
DIFFERENTIAL METHOD BLD: ABNORMAL
EOSINOPHIL # BLD: 0.2 K/UL (ref 0–0.4)
EOSINOPHIL NFR BLD: 5 % (ref 0–5)
ERYTHROCYTE [DISTWIDTH] IN BLOOD BY AUTOMATED COUNT: 14.9 % (ref 11.6–14.5)
GAMMA GLOB SERPL ELPH-MCNC: 0.9 G/DL (ref 0.4–1.8)
GLOBULIN SER-MCNC: 2.6 G/DL (ref 2.2–3.9)
GLUCOSE SERPL-MCNC: 86 MG/DL (ref 74–99)
HCT VFR BLD AUTO: 30.2 % (ref 36–48)
HGB BLD-MCNC: 9.7 G/DL (ref 13–16)
IGA SERPL-MCNC: 278 MG/DL (ref 90–386)
IGG SERPL-MCNC: 1104 MG/DL (ref 603–1613)
IGM SERPL-MCNC: 41 MG/DL (ref 20–172)
INTERPRETATION SERPL IEP-IMP: ABNORMAL
LYMPHOCYTES # BLD: 1.8 K/UL (ref 0.9–3.6)
LYMPHOCYTES NFR BLD: 40 % (ref 21–52)
M PROTEIN SERPL ELPH-MCNC: ABNORMAL G/DL
MCH RBC QN AUTO: 23.3 PG (ref 24–34)
MCHC RBC AUTO-ENTMCNC: 32.1 G/DL (ref 31–37)
MCV RBC AUTO: 72.6 FL (ref 74–97)
MONOCYTES # BLD: 0.5 K/UL (ref 0.05–1.2)
MONOCYTES NFR BLD: 12 % (ref 3–10)
NEUTS SEG # BLD: 1.8 K/UL (ref 1.8–8)
NEUTS SEG NFR BLD: 42 % (ref 40–73)
PHOSPHATE SERPL-MCNC: 3.4 MG/DL (ref 2.5–4.9)
PLATELET # BLD AUTO: 152 K/UL (ref 135–420)
PMV BLD AUTO: 11 FL (ref 9.2–11.8)
POTASSIUM SERPL-SCNC: 3.9 MMOL/L (ref 3.5–5.5)
PROT SERPL-MCNC: 6.3 G/DL (ref 6–8.5)
RBC # BLD AUTO: 4.16 M/UL (ref 4.7–5.5)
SODIUM SERPL-SCNC: 144 MMOL/L (ref 136–145)
WBC # BLD AUTO: 4.4 K/UL (ref 4.6–13.2)

## 2020-05-01 PROCEDURE — 99218 HC RM OBSERVATION: CPT

## 2020-05-01 PROCEDURE — 74011250637 HC RX REV CODE- 250/637: Performed by: HOSPITALIST

## 2020-05-01 PROCEDURE — 80069 RENAL FUNCTION PANEL: CPT

## 2020-05-01 PROCEDURE — 74011250636 HC RX REV CODE- 250/636: Performed by: INTERNAL MEDICINE

## 2020-05-01 PROCEDURE — 74011250637 HC RX REV CODE- 250/637: Performed by: FAMILY MEDICINE

## 2020-05-01 PROCEDURE — 74011250637 HC RX REV CODE- 250/637: Performed by: INTERNAL MEDICINE

## 2020-05-01 PROCEDURE — 96372 THER/PROPH/DIAG INJ SC/IM: CPT

## 2020-05-01 PROCEDURE — 85025 COMPLETE CBC W/AUTO DIFF WBC: CPT

## 2020-05-01 PROCEDURE — 36415 COLL VENOUS BLD VENIPUNCTURE: CPT

## 2020-05-01 RX ORDER — CALCITRIOL 0.25 UG/1
0.25 CAPSULE ORAL
Status: DISCONTINUED | OUTPATIENT
Start: 2020-05-01 | End: 2020-05-01 | Stop reason: HOSPADM

## 2020-05-01 RX ORDER — CALCITRIOL 0.25 UG/1
0.25 CAPSULE ORAL
Status: DISCONTINUED | OUTPATIENT
Start: 2020-05-01 | End: 2020-05-01

## 2020-05-01 RX ORDER — AMLODIPINE BESYLATE 10 MG/1
10 TABLET ORAL DAILY
Qty: 30 TAB | Refills: 0 | Status: SHIPPED | OUTPATIENT
Start: 2020-05-01

## 2020-05-01 RX ORDER — CALCITRIOL 0.25 UG/1
0.25 CAPSULE ORAL 3 TIMES WEEKLY
Qty: 12 CAP | Refills: 2 | Status: SHIPPED | OUTPATIENT
Start: 2020-05-01 | End: 2020-05-31

## 2020-05-01 RX ORDER — CALCITRIOL 0.25 UG/1
0.25 CAPSULE ORAL 3 TIMES WEEKLY
Status: DISCONTINUED | OUTPATIENT
Start: 2020-05-01 | End: 2020-05-01

## 2020-05-01 RX ORDER — ISOSORBIDE MONONITRATE 30 MG/1
30 TABLET, EXTENDED RELEASE ORAL DAILY
Qty: 30 TAB | Refills: 0 | Status: SHIPPED | OUTPATIENT
Start: 2020-05-02

## 2020-05-01 RX ADMIN — LEVOTHYROXINE SODIUM 25 MCG: 0.03 TABLET ORAL at 10:46

## 2020-05-01 RX ADMIN — HEPARIN SODIUM 5000 UNITS: 5000 INJECTION INTRAVENOUS; SUBCUTANEOUS at 10:47

## 2020-05-01 RX ADMIN — HYDROCHLOROTHIAZIDE 12.5 MG: 25 TABLET ORAL at 10:46

## 2020-05-01 RX ADMIN — AMLODIPINE BESYLATE 7.5 MG: 2.5 TABLET ORAL at 10:47

## 2020-05-01 RX ADMIN — CLOPIDOGREL BISULFATE 75 MG: 75 TABLET ORAL at 10:47

## 2020-05-01 RX ADMIN — CALCITRIOL 0.25 MCG: 0.25 CAPSULE ORAL at 14:23

## 2020-05-01 RX ADMIN — ASPIRIN 325 MG ORAL TABLET 325 MG: 325 PILL ORAL at 10:46

## 2020-05-01 RX ADMIN — CARVEDILOL 25 MG: 12.5 TABLET, FILM COATED ORAL at 10:47

## 2020-05-01 RX ADMIN — ISOSORBIDE MONONITRATE 30 MG: 30 TABLET, EXTENDED RELEASE ORAL at 10:47

## 2020-05-01 NOTE — DISCHARGE SUMMARY
Discharge Summary    Patient: Yen Baird MRN: [id number]  CSN: [id number]    YOB: 1965  Age: 53 y.o. Sex: male    DOA: 4/27/2020 LOS:  LOS: 3 days   Discharge Date:      Primary Care Provider:  Frandy Sifuentes MD    Admission Diagnoses: Acute on chronic renal failure ([facility] [N17.9, N18.9]  Peripheral vascular disease ([facility] [I73.9]    Discharge Diagnoses:    Hospital Problems  Date Reviewed: 4/28/2020          Codes Class Noted POA    Hypoalbuminemia ICD-10-CM: E88.09  ICD-9-CM: 273.8  4/29/2020 Unknown        * (Principal) Acute on chronic renal failure ([facility] ICD-10-CM: N17.9, N18.9  ICD-9-CM: 584.9, 585.9  4/28/2020 Yes        Peripheral vascular disease ([facility] ICD-10-CM: I73.9  ICD-9-CM: 443.9  3/24/2015 Yes        Hypertension ICD-10-CM: I10  ICD-9-CM: 401.9  3/24/2015 Yes        Chest pain, unspecified ICD-10-CM: R07.9  ICD-9-CM: 786.50  10/15/2013 Yes        CAD (coronary artery disease), native coronary artery ICD-10-CM: I25.10  ICD-9-CM: 414.01  5/12/2012 Yes              Discharge Condition: stable     Discharge Medications:     Current Discharge Medication List      START taking these medications    Details   calcitRIOL (ROCALTROL) 0.25 mcg capsule Take 1 Cap by mouth Three (3) times a week for 30 days. Qty: 12 Cap, Refills: 2      isosorbide mononitrate ER (IMDUR) 30 mg tablet Take 1 Tab by mouth daily. Qty: 30 Tab, Refills: 0         CONTINUE these medications which have CHANGED    Details   amLODIPine (Norvasc) 10 mg tablet Take 1 Tab by mouth daily. Qty: 30 Tab, Refills: 0         CONTINUE these medications which have NOT CHANGED    Details   carvedilol (COREG) 25 mg tablet Take 25 mg by mouth two (2) times daily (with meals). Associated Diagnoses: CAD (coronary artery disease), native coronary artery; High cholesterol; Chest pain, unspecified      aspirin (ASPIRIN) 325 mg tablet Take 325 mg by mouth daily.     Associated Diagnoses: CAD (coronary artery disease), native coronary artery; High cholesterol; Chest pain, unspecified      pravastatin (PRAVACHOL) 20 mg tablet Take 20 mg by mouth nightly. Associated Diagnoses: CAD (coronary artery disease), native coronary artery; High cholesterol; Chest pain, unspecified      clopidogrel (PLAVIX) 75 mg tablet Take 1 Tab by mouth daily. Qty: 90 Tab, Refills: 3      nitroglycerin (NITROSTAT) 0.4 mg SL tablet 1 Tab by SubLINGual route every five (5) minutes as needed for Chest Pain. Qty: 25 Tab, Refills: 4      levothyroxine (SYNTHROID) 25 mcg tablet Take 25 mcg by mouth Daily (before breakfast). Hydrochlorothiazide (HYDRODIURIL) 12.5 mg tablet Take 12.5 mg by mouth daily. Associated Diagnoses: CAD (coronary artery disease), native coronary artery; High cholesterol; Chest pain, unspecified         STOP taking these medications       losartan (COZAAR) 100 mg tablet Comments:   Reason for Stopping:         NIFEdipine ER (ADALAT CC) 30 mg ER tablet Comments:   Reason for Stopping:               Procedures : none     Consults: Cardiology and Nephrology      PHYSICAL EXAM  Visit Vitals  BP (!) 164/91 (BP 1 Location: Left arm, BP Patient Position: At rest)   Pulse 64   Temp 98 °F (36.7 °C)   Resp 18   Ht 5' 5\" (1.651 m)   Wt 77.1 kg (170 lb)   SpO2 100%   BMI 28.29 kg/m²     General: Awake, cooperative, no acute distress    HEENT: NC, Atraumatic. PERRLA, EOMI. Anicteric sclerae. Lungs:  CTA Bilaterally. No Wheezing/Rhonchi/Rales. Heart:  Regular  rhythm,  No murmur, No Rubs, No Gallops  Abdomen: Soft, Non distended, Non tender. +Bowel sounds,   Extremities: No c/c/e  Psych:   Not anxious or agitated. Neurologic:  No acute neurological deficits. Admission HPI :   Polo Palmer is a 53 y.o. male who has a hx of CAD and PAD s/p angioplasty and left iliac stenting who presents with 1.5 week history of dyspnea on exertion and b/l leg swelling R>L.  Does not have any dyspnea at rest but feels like his discomfort with walking is similar to previous angina pain. Hospital Course :   Mario Carson is a 53 y.o. male who has a hx of CAD and PAD s/p angioplasty and left iliac stenting was admitted due to leg swelling, chest pain and conrado on ckd3. Since he was admitted, iv heparin gtt was started for possible acs and thrombosis. Pvl/pal done no dvt and thrombosis. ce was monitored and no acs indication. Heparin was hold. Cardiologist was on board, stress test was abnormal, cath was hold due to  conrado on ckd3. imdur was added and no chest pain during his stay. Cardiologist recommended to have cath as out-pt. Renal was on board for conrado on ckd3. He received iv hydration and his cr got improving and stable. He was cleared to be d.c per nephrologist. He has hcv, not be treated, recommended to f/u with / [facility] for evaluation and treatment     Discharge planning discussed with patient, pt agrees  with the plan and no questions and concerns at this point.        Activity: Activity as tolerated    Diet: Cardiac Diet    Follow-up: PCP and dr. La Rivera and Dr. Kody Santos and [facility]. [facility]     Disposition: home     Minutes spent on discharge: 45 min       Labs: Results:       Chemistry Recent Labs     05/01/20 0335 04/30/20  0333 04/29/20  1155 04/29/20  0315   GLU 86 79 106* 74    145 144 145   K 3.9 3.9 4.4 3.9   * 117* 117* 114*   CO2 20* 21 20* 23   BUN 39* 39* 38* 41*   CREA 3.20* 3.21* 3.34* 3.38*   CA 8.1* 8.2* 8.1* 8.3*  8.3*   AGAP 8 7 7 8   BUCR 12 12 11* 12   AP  --  53  --  66   TP  --  6.7  --  6.4   ALB 3.6 4.0 3.3* 3.5   GLOB  --  2.7  --  2.9   AGRAT  --  1.5  --  1.2      CBC w/Diff Recent Labs     05/01/20  0335 04/30/20  1225 04/30/20  0333 04/29/20  1156   WBC 4.4* 4.2* 4.3* 4.0*   RBC 4.16* 3.94* 3.94* 4.21*   HGB 9.7* 9.1* 9.0* 9.7*   HCT 30.2* 28.6* 28.3* 30.5*    155 129* 155   GRANS 42  --  46 49   LYMPH 40  --  40 33   EOS 5  --  4 4      Cardiac Enzymes Recent Labs 04/28/20  1748      CKND1 1.0      Coagulation Recent Labs     04/30/20 0333 04/29/20 0315   APTT 49.9* 102.9*       Lipid Panel Lab Results   Component Value Date/Time    Cholesterol, total 142 02/25/2013 09:38 AM    HDL Cholesterol 66 (H) 02/25/2013 09:38 AM    LDL,Direct 124 (H) 06/22/2011 02:05 AM    LDL, calculated 60.2 02/25/2013 09:38 AM    VLDL, calculated 15.8 02/25/2013 09:38 AM    Triglyceride 79 02/25/2013 09:38 AM    CHOL/HDL Ratio 2.2 02/25/2013 09:38 AM      BNP No results for input(s): BNPP in the last 72 hours. Liver Enzymes Recent Labs     05/01/20 0335 04/30/20 0333   TP  --  6.7   ALB 3.6 4.0   AP  --  53   SGOT  --  24      Thyroid Studies No results found for: T4, T3U, TSH, TSHEXT       @micro    Significant Diagnostic Studies: Xr Chest Port    Result Date: 4/28/2020  --------------------------------------------------------------------------- <<<<<<<<<           [facility]           >>>>>>>>> --------------------------------------------------------------------------- CLINICAL HISTORY:  Shortness of breath. COMPARISON EXAMINATIONS:  May 14, 2012. ---  SINGLE FRONTAL VIEW OF THE CHEST  --- The lungs and pleural spaces are clear. The mediastinum is unremarkable in appearance. No significant osseous abnormalities are identified.  --------------    Impression: -------------- No active cardiopulmonary disease.             [facility]     CC: Althea Tenorio MD

## 2020-05-01 NOTE — PROGRESS NOTES
Transition of care: anticipate d/c home today  Did not enter room due to covid restrictions. Telephone call with patient reports that the doctor told him he could go home today. Cm asked how would he get home states his siter will come about 1400 to pick him up. Patient repors he does not want [facility] at his home  Reports his sister will drive him to appointments and what ever else he needs   He does not use any DMe. HE will need follow up with CMs will assist.   He states he does not need anything else for a safe d/c home  Care Management Interventions  PCP Verified by CM:  Yes  Transition of Care Consult (CM Consult): Discharge Planning  Current Support Network: Relative's Home  Confirm Follow Up Transport: Family  The Plan for Transition of Care is Related to the Following Treatment Goals : d/c home family when medically cleared  The Patient and/or Patient Representative was Provided with a Choice of Provider and Agrees with the Discharge Plan?: Yes  Freedom of Choice List was Provided with Basic Dialogue that Supports the Patient's Individualized Plan of Care/Goals, Treatment Preferences and Shares the Quality Data Associated with the Providers?: Yes  Discharge Location  Discharge Placement: Home with family assistance

## 2020-05-01 NOTE — PROGRESS NOTES
An After Visit Summary was printed and given to the patient. Prescriptions sent to preferred pharmacy. Patient left with all belonging. Transported home via car with sister.

## 2020-05-01 NOTE — DISCHARGE INSTRUCTIONS
Patient Education        Kidney Disease and High Blood Pressure: Care Instructions  Your Care Instructions    Long-term (chronic) kidney disease happens when the kidneys cannot remove waste and keep your body's fluids and chemicals in balance. Usually, the kidneys remove waste from the blood through the urine. When the kidneys are not working well, waste can build up so much that it poisons the body. Kidney disease can make you very tired. It also can cause swelling, or edema, in your legs or other areas of your body. High blood pressure is one of the major causes of chronic kidney disease. And kidney disease can also cause high blood pressure. No matter which came first, having high blood pressure damages the tiny blood vessels in the kidneys. If you have high blood pressure, it is important to lower it. There are many things you can do to lower your blood pressure, which may help slow or stop the damage to your kidneys. Follow-up care is a key part of your treatment and safety. Be sure to make and go to all appointments, and call your doctor if you are having problems. It's also a good idea to know your test results and keep a list of the medicines you take. How can you care for yourself at home? · Be safe with medicines. Take your medicines exactly as prescribed. Call your doctor if you have any problems with your medicine. You will probably need more than one medicine to lower your blood pressure. You will get more details on the specific medicines your doctor prescribes. · Work with your doctor and a dietitian to plan meals that have the right amount of nutrients for you. You will probably have to limit salt, fluids, and protein. · Stay at a healthy weight. This is very important if you put on weight around the waist. Losing even 10 pounds can help you lower your blood pressure. · Manage other health problems such as diabetes and high cholesterol.  You can help lower your risk for heart disease and blood vessel problems with a healthy lifestyle along with medicines. · Do not take ibuprofen (Advil, Motrin) or naproxen (Aleve), or similar medicines, unless your doctor tells you to. They may make chronic kidney disease worse. It is okay to take acetaminophen (Tylenol). · If your doctor recommends it, get more exercise. Walking is a good choice. Bit by bit, increase the amount you walk every day. Try for at least 30 minutes on most days of the week. You also may want to swim, bike, or do other activities. · Limit or avoid alcohol. Talk to your doctor about whether you can drink any alcohol. · Do not smoke or allow others to smoke around you. If you need help quitting, talk to your doctor about stop-smoking programs and medicines. These can increase your chances of quitting for good. When should you call for help? Call 911 anytime you think you may need emergency care. For example, call if:    · You passed out (lost consciousness).    Call your doctor now or seek immediate medical care if:    · You have new or worse nausea and vomiting.     · You have much less urine than normal, or you have no urine.     · You are feeling confused or cannot think clearly.     · You have new or more blood in your urine.     · You have new swelling.     · You are dizzy or lightheaded, or you feel like you may faint.    Watch closely for changes in your health, and be sure to contact your doctor if:    · You do not get better as expected. Where can you learn more? Go to http://www.gray.com/  Enter X971 in the search box to learn more about \"Kidney Disease and High Blood Pressure: Care Instructions. \"  Current as of: August 11, 2019Content Version: 12.4  © 2872-2528 [employer], [employer]. Care instructions adapted under license by [employer] (which disclaims liability or warranty for this information).  If you have questions about a medical condition or this instruction, always ask your healthcare professional. [address] any warranty or liability for your use of this information. DISCHARGE SUMMARY from Nurse    PATIENT INSTRUCTIONS:      What to do at Home:  Recommended activity: Activity as tolerated     please follow up with primary care physician, cardiology and nephrology. *  Please give a list of your current medications to your Primary Care Provider. *  Please update this list whenever your medications are discontinued, doses are      changed, or new medications (including over-the-counter products) are added. *  Please carry medication information at all times in case of emergency situations. These are general instructions for a healthy lifestyle:    No smoking/ No tobacco products/ Avoid exposure to second hand smoke  Surgeon General's Warning:  Quitting smoking now greatly reduces serious risk to your health. Obesity, smoking, and sedentary lifestyle greatly increases your risk for illness    A healthy diet, regular physical exercise & weight monitoring are important for maintaining a healthy lifestyle    You may be retaining fluid if you have a history of heart failure or if you experience any of the following symptoms:  Weight gain of 3 pounds or more overnight or 5 pounds in a week, increased swelling in our hands or feet or shortness of breath while lying flat in bed. Please call your doctor as soon as you notice any of these symptoms; do not wait until your next office visit. The discharge information has been reviewed with the patient. The patient verbalized understanding. Discharge medications reviewed with the patient and appropriate educational materials and side effects teaching were provided.     Patient armband removed and shredded    ___________________________________________________________________________________________________________________________________

## 2020-05-01 NOTE — PROGRESS NOTES
Nephrology Inpatient Progress note    Subjective:     Corey Ackerman is a 53 y.o. male with a past medical history significant for HTN, CAD, CVA, Chronic Hepatitis C, CKD who presented to the ED with c/o Palpitation, Dyspnea on exertion and Right Calf pain/swelling. Patient admitted and started on IV Heparin. Labs on admission revealed a S Cr 4.3 and repeat today 4. 16. Available labs from [facility] records reveal S Cr of 2.7 on 2/11/20, 2.39 back on 8/13/19. Work up thus far:  No evidence of DVT on Duplex study of lower extremities. Arterial duplex study showed no evidence of significant peripheral arterial occlusive disease in both lower extremities. Nuclear Stress test Abnormal. ECHO EF 55%. Nephrology was consulted for further evaluation and management of Renal Failure. Patient denies use of NSAIDs or herbal supplements. No h/o Kidney stones, Prostatic disease or Kidney infections.    -Pt remains stable overnight. No complaints currently,  Cr stable at 3.2.     Admit Date: 4/27/2020  Principal Problem:    Acute on chronic renal failure (HCC) (4/28/2020)    Active Problems:    CAD (coronary artery disease), native coronary artery (5/12/2012)      Chest pain, unspecified (10/15/2013)      Peripheral vascular disease ([address] (3/24/2015)      Hypertension (3/24/2015)      Hypoalbuminemia (4/29/2020)      Current Facility-Administered Medications   Medication Dose Route Frequency    amLODIPine (NORVASC) tablet 7.5 mg  7.5 mg Oral DAILY    heparin (porcine) injection 5,000 Units  5,000 Units SubCUTAneous Q8H    aspirin tablet 325 mg  325 mg Oral DAILY    carvediloL (COREG) tablet 25 mg  25 mg Oral BID WITH MEALS    clopidogreL (PLAVIX) tablet 75 mg  75 mg Oral DAILY    hydroCHLOROthiazide (HYDRODIURIL) tablet 12.5 mg  12.5 mg Oral DAILY    levothyroxine (SYNTHROID) tablet 25 mcg  25 mcg Oral ACB    pravastatin (PRAVACHOL) tablet 20 mg  20 mg Oral QHS    sodium chloride (NS) flush 5-40 mL  5-40 mL IntraVENous Q8H    sodium chloride (NS) flush 5-40 mL  5-40 mL IntraVENous PRN    ondansetron (ZOFRAN) injection 4 mg  4 mg IntraVENous Q4H PRN    0.9% sodium chloride infusion  50 mL/hr IntraVENous CONTINUOUS    isosorbide mononitrate ER (IMDUR) tablet 30 mg  30 mg Oral DAILY         Allergy:   No Known Allergies     Objective:     Visit Vitals  BP (!) 164/91 (BP 1 Location: Left arm, BP Patient Position: At rest)   Pulse 64   Temp 98 °F (36.7 °C)   Resp 18   Ht 5' 5\" (1.651 m)   Wt 77.1 kg (170 lb)   SpO2 100%   BMI 28.29 kg/m²         Intake/Output Summary (Last 24 hours) at 5/1/2020 1101  Last data filed at 5/1/2020 9486  Gross per 24 hour   Intake 0 ml   Output 0 ml   Net 0 ml       Physical Exam:       General: No acute distress   HENT: Atraumatic and normocephalic   Eyes: Normal conjunctiva   Neck: Supple with no mass   Cardiovascular: Normal S1 & S2, no m/r/g   Pulmonary/Chest Wall: Clear to auscultation bilaterally   Abdominal: Soft and non-tender   Musculoskeletal: No edema   Neurological: No focal deficits     Data Review:  Lab Results   Component Value Date/Time    Sodium 144 05/01/2020 03:35 AM    Potassium 3.9 05/01/2020 03:35 AM    Chloride 116 (H) 05/01/2020 03:35 AM    CO2 20 (L) 05/01/2020 03:35 AM    Anion gap 8 05/01/2020 03:35 AM    Glucose 86 05/01/2020 03:35 AM    BUN 39 (H) 05/01/2020 03:35 AM    Creatinine 3.20 (H) 05/01/2020 03:35 AM    BUN/Creatinine ratio 12 05/01/2020 03:35 AM    GFR est AA 25 (L) 05/01/2020 03:35 AM    GFR est non-AA 20 (L) 05/01/2020 03:35 AM    Calcium 8.1 (L) 05/01/2020 03:35 AM     Lab Results   Component Value Date/Time    WBC 4.4 (L) 05/01/2020 03:35 AM    HGB 9.7 (L) 05/01/2020 03:35 AM    HCT 30.2 (L) 05/01/2020 03:35 AM    PLATELET 335 69/11/8542 03:35 AM    MCV 72.6 (L) 05/01/2020 03:35 AM     Lab Results   Component Value Date/Time    Calcium 8.1 (L) 05/01/2020 03:35 AM    Phosphorus 3.4 05/01/2020 03:35 AM     No results found for: IRON, FE, TIBC, IBCT, PSAT, FERR  No results found for: FERR       Impression:      - Acute Kidney Injury on CKD. Possibly has some Prerenal component. Patient has underlying CKD sec to Hypertensive Nephrosclerosis. Possible Chronic glomerular disease sec to chronic Hep C.  S Cr improving with IV Hydration, S Cr slowly improving from 4.3==>3.2. So far GN serology neg. -HTN  -Proteinuria, Prot/Cr ratio 5.0, Nephrotic range possibly sec to hypertensive Nephrosclerosis, possible Hep C related glomerulopathy, possible  FSGS. C3, C4, LIANA, Rh Factor neg.   -Sec HPT  -CAD s/p PTCA  -PAD  -Anemia likely sec to CKD  -Chronic Hep C  -Hypoalbuminemia     Plan:      1. D/C IVF  2. Start Calcitriol 0.25mcg TIW  3. Cont to hold ARB Cozaar for now. Continue Amlodipine and Carvedilol  4. Monitor Serum chemistry and UO  5. Still pending SIFE and Cryoglobulins  6. Recommend evaluation and treatment of Chronic Hep C  By Hepatologist  7. Would avoid IV contrast dye for now due to BRIANNA. Would like to see S Cr return to recent baseline if possible before IV contrast exposure. 8. OK to discharge from renal stand point and Our group will follow him up after discharge.           MD Erick Mcnally  [phone]

## 2020-05-14 ENCOUNTER — HOSPITAL ENCOUNTER (OUTPATIENT)
Dept: LAB | Age: 55
Discharge: HOME | End: 2020-05-14

## 2020-05-14 LAB — XX-LABCORP SPECIMEN COL,LCBCF: NORMAL

## 2020-05-14 PROCEDURE — 99001 SPECIMEN HANDLING PT-LAB: CPT

## 2020-05-26 ENCOUNTER — HOSPITAL ENCOUNTER (OUTPATIENT)
Dept: LAB | Age: 55
Discharge: HOME | End: 2020-05-26

## 2020-05-26 LAB — XX-LABCORP SPECIMEN COL,LCBCF: NORMAL

## 2020-05-26 PROCEDURE — 99001 SPECIMEN HANDLING PT-LAB: CPT

## 2020-07-06 ENCOUNTER — OFFICE VISIT (OUTPATIENT)
Dept: HEMATOLOGY | Age: 55
End: 2020-07-06

## 2020-07-06 ENCOUNTER — HOSPITAL ENCOUNTER (OUTPATIENT)
Dept: LAB | Age: 55
Discharge: HOME | End: 2020-07-06

## 2020-07-06 VITALS
HEIGHT: 65 IN | DIASTOLIC BLOOD PRESSURE: 82 MMHG | TEMPERATURE: 96.9 F | SYSTOLIC BLOOD PRESSURE: 140 MMHG | HEART RATE: 55 BPM | WEIGHT: 167 LBS | BODY MASS INDEX: 27.82 KG/M2 | OXYGEN SATURATION: 98 %

## 2020-07-06 DIAGNOSIS — B18.2 HEP C W/O COMA, CHRONIC (HCC): Primary | ICD-10-CM

## 2020-07-06 LAB
AMMONIA PLAS-SCNC: 36 UMOL/L (ref 11–32)
XX-LABCORP SPECIMEN COL,LCBCF: NORMAL

## 2020-07-06 PROCEDURE — 99001 SPECIMEN HANDLING PT-LAB: CPT

## 2020-07-06 PROCEDURE — 82140 ASSAY OF AMMONIA: CPT

## 2020-07-06 RX ORDER — SODIUM BICARBONATE 325 MG/1
325 TABLET ORAL 4 TIMES DAILY
COMMUNITY
End: 2020-12-15

## 2020-07-06 RX ORDER — ACETAMINOPHEN 500 MG
TABLET ORAL 2 TIMES DAILY
COMMUNITY
End: 2020-07-06 | Stop reason: ALTCHOICE

## 2020-07-06 RX ORDER — ERGOCALCIFEROL 1.25 MG/1
50000 CAPSULE ORAL
COMMUNITY
End: 2020-12-15

## 2020-07-06 RX ORDER — ATORVASTATIN CALCIUM 20 MG/1
TABLET, FILM COATED ORAL DAILY
COMMUNITY
End: 2020-12-15 | Stop reason: ALTCHOICE

## 2020-07-06 NOTE — PROGRESS NOTES
[facility] MD Jonathan, MD Karolyn Styles PA-C [place] [facility], ACNP-BC     April S Schuyler, [facility]NP-BC   DANIEL KumarP-HATTIE    Kelly Nicoleland, [facility]       Shiv Herrera [facility] [address]    at [address], [address] [place] [address], [facility] 22.    [phone]    FAX: [address] [place] [address], [address], [address]    [phone]    FAX: [phone]       Patient Care Team:  Victorina Mayo MD as PCP - General ([facility])      Problem List  Date Reviewed: 4/28/2020          Codes Class Noted    Hypoalbuminemia ICD-10-CM: E88.09  ICD-9-CM: 273.8  4/29/2020        Acute on chronic renal failure ([facility] ICD-10-CM: N17.9, N18.9  ICD-9-CM: 584.9, 585.9  4/28/2020        Peripheral vascular disease ([facility] ICD-10-CM: I73.9  ICD-9-CM: 443.9  3/24/2015        Hypertension ICD-10-CM: I10  ICD-9-CM: 401.9  3/24/2015        Chest pain, unspecified ICD-10-CM: R07.9  ICD-9-CM: 786.50  10/15/2013        High cholesterol ICD-10-CM: E78.00  ICD-9-CM: 272.0  10/15/2013        H/O: CVA (cerebrovascular accident) ICD-10-CM: Z86.73  ICD-9-CM: V12.54  10/15/2013        H/O acute myocardial infarction ICD-10-CM: I25.2  ICD-9-CM: 412  10/15/2013        S/P angioplasty with stent ICD-10-CM: Z95.820  ICD-9-CM: V45.89  10/15/2013        S/P insertion of iliac artery stent ICD-10-CM: Z95.828  ICD-9-CM: V45.89  10/15/2013    Overview Signed 10/15/2013  6:50 AM by Haydee Urbano     left             CAD (coronary artery disease), native coronary artery ICD-10-CM: I25.10  ICD-9-CM: 414.01  5/12/2012        PVD (peripheral vascular disease) with claudication ([facility] ICD-10-CM: I73.9  ICD-9-CM: 443.9  5/12/2012        HBP (high blood pressure) ICD-10-CM: I10  ICD-9-CM: 401.9  5/12/2012                The clinicians listed above have asked me to see Matheus Phan in consultation regarding chronic HCV and its management. All medical records sent by the referring physicians were reviewed including imaging studies via [facility]. The patient is a 53 y.o. Black male who was found to have abnormalities in liver chemistries and subsequently tested positive for chronic HCV who was screened for anti-HCV and tested positive after being incarcerated in early late 1980's. Risk factors for acquiring HCV are IV drug use in late 1980's. There was no history of acute icteric hepatitis at the time of these risk factors. CT scan of the liver was performed in 12/2019. The results of the imaging demonstrated a normal appearing liver. An assessment of liver fibrosis with biopsy or elastography has not been performed. The patient has never received treatment for chronic HCV. The patient has no symptoms which can be attributed to the liver disorder. The patient completes all daily activities without any functional limitations. The patient has not experienced fatigue, fevers, chills, shortness of breath, chest pain, pain in the right side over the liver, diffuse abdominal pain, nausea, vomiting, constipation, diarrhrea, dry eyes, dry mouth, arthralgias, myalgias, yellowing of the eyes or skin, itching, dark urine, problems concentrating, swelling of the abdomen, swelling of the lower extremities, hematemesis, or hematochezia. ASSESSMENT AND PLAN:  Chronic HCV   Chronic HCV of unclear severity. The most recent laboratory studies indicate that the liver transaminases are normal, alkaline phosphatase is normal, tests of hepatic synthetic and metabolic function are normal, and the platelet count is normal.      Based upon laboratory studies and imaging the patient does not appear to have significant liver injury.     Will perform laboratory testing to monitor liver function and degree of liver injury. Will perform HCV genotype to define the specific treatment and duration of treatment that will be required. Will perform serologic and virologic studies to assess for other causes of chronic liver disease. Will perform imaging of the liver with ultrasound. The degree of fibrosis will be assessed by shear wave elastography. Chronic HCV Treatment  The patient has not been treated for HCV. The patient has HCV genotype that is not yet defined. Discussed the treatment alternatives. The SVR/cure rate for HCV now exceeds 97% without significant side effects for most patients with HCV. The specific treatment is dependent upon genotype, viral load and histology. The patient should be treated with Epclusa (sofosbuvir and valpitasvir) for 12 weeks. This treatment regime was explained in detail, including dosing and side effects. Educational material was provided. Screening for Hepatocellular Carcinoma  HCC screening has recently been performed and does not suggest [address] Both an AFP-L3% and an ultrasound were ordered today. Treatment of other medical problems in patients with chronic liver disease  There are no contraindications for the patient to take most medications that are necessary for treatment of other medical issues. The patient does not comsume alcohol on a daily basis. Quit drinking \"about 4 months, maybe more\"   Normal doses of acetaminophen, as recommended on the label of the bottle, are not hepatotoxic except in the setting of daily alcohol use, even in patients with cirrhosis and can be utilized for pain. Counseling for alcohol in patients with chronic liver disease  The patient was counseled regarding alcohol consumption and the effect of alcohol on chronic liver disease.   The patient has cirrhosis and was advised to be abstinent from all alcohol including non-alcoholic beer which does contain some alcohol. Substance Use  The patient has not used drugs since late 1980's. Vaccinations   The need for vaccination against viral hepatitis A and B will be assessed with serologic and instituted as appropriate. Routine vaccinations against other bacterial and viral agents can be performed as indicated. Annual flu vaccination should be administered if indicated. ALLERGIES  No Known Allergies    MEDICATIONS  Current Outpatient Medications   Medication Sig    cholecalciferol (VITAMIN D3) (2,000 UNITS /50 MCG) cap capsule Take  by mouth two (2) times a day.  atorvastatin (LIPITOR) 20 mg tablet Take  by mouth daily.  sodium bicarbonate 325 mg tablet Take 325 mg by mouth four (4) times daily.  isosorbide mononitrate ER (IMDUR) 30 mg tablet Take 1 Tab by mouth daily.  amLODIPine (Norvasc) 10 mg tablet Take 1 Tab by mouth daily.  carvedilol (COREG) 25 mg tablet Take 25 mg by mouth two (2) times daily (with meals).  aspirin (ASPIRIN) 325 mg tablet Take 325 mg by mouth daily.  Hydrochlorothiazide (HYDRODIURIL) 12.5 mg tablet Take 12.5 mg by mouth daily.  pravastatin (PRAVACHOL) 20 mg tablet Take 20 mg by mouth nightly.  clopidogrel (PLAVIX) 75 mg tablet Take 1 Tab by mouth daily.  nitroglycerin (NITROSTAT) 0.4 mg SL tablet 1 Tab by SubLINGual route every five (5) minutes as needed for Chest Pain.  levothyroxine (SYNTHROID) 25 mcg tablet Take 25 mcg by mouth Daily (before breakfast). No current facility-administered medications for this visit. SYSTEM REVIEW NOT RELATED TO LIVER DISEASE OR REVIEWED ABOVE:  Constitution systems: Negative for fever, chills, weight gain, weight loss. Eyes: Negative for visual changes. ENT: Negative for sore throat, painful swallowing. Respiratory: Negative for cough, hemoptysis, SOB. Cardiology: Negative for chest pain, palpitations. GI:  Negative for constipation or diarrhea.   : Negative for urinary frequency, dysuria, hematuria, nocturia. Skin: Negative for rash. Hematology: Negative for easy bruising, blood clots. Musculo-skelatal: Negative for back pain, muscle pain, weakness. Neurologic: Negative for headaches, dizziness, vertigo, memory problems not related to HE. Psychology: Negative for anxiety, depression. FAMILY HISTORY:  The father  of heart failure at age 67. The mother  of CVA in 2006 at age 61. There is no family history of liver disease. There is no family history of immune disorders. SOCIAL HISTORY:  The patient has never been . The patient has 1 child and 4 grandchildren. The patient stopped using tobacco products in \"4 or 5 months ago\"  The patient has been abstinent from alcohol since \"4 or 5 months ago\"    The patient does not work. The patient is currently receiving disability. PHYSICAL EXAMINATION:  Visit Vitals  /82   Pulse (!) 55   Temp 96.9 °F (36.1 °C) (Tympanic)   Ht 5' 5\" (1.651 m)   Wt 167 lb (75.8 kg)   SpO2 98%   BMI 27.79 kg/m²     General: No acute distress. Eyes: Sclera anicteric. ENT: No oral lesions. Thyroid normal.  Nodes: No adenopathy. Skin: No spider angiomata. No jaundice. No palmar erythema. Respiratory: Lungs clear to auscultation. Cardiovascular: Regular heart rate. No murmurs. No JVD. Abdomen: Soft non-tender. Liver size normal to percussion/palpation. Spleen not palpable. No obvious ascites. Extremities: No edema. No muscle wasting. No gross arthritic changes. Neurologic: Alert and oriented. Cranial nerves grossly intact. No asterixis. LABORATORY STUDIES:  From 2020 [facility])  AST/ ALT/ ALP/ T. BILI/ ALB:  / / 73/ 0.2/ 4.2  BUN/ CRT:  67/ 4.84  PLT:  211,000    SEROLOGIES:  Not available or performed. Testing was performed today. LIVER HISTOLOGY:  Not available or performed    ENDOSCOPIC PROCEDURES:  Not available or performed    RADIOLOGY:  2019.   Abdominal/pelvic CT wo contrast.  The liver, gallbladder, spleen, pancreas and adrenal glands are grossly normal.    OTHER TESTING:  Not available or performed    FOLLOW-UP:  All of the issues listed above in the Assessment and Plan were discussed with the patient. All questions were answered. The patient expressed a clear understanding of the above. [facility] 87 in 8 weeks. This should be about treatment week #4.       MADY Das  [facility]  [address] [facility], [address] [facility]   [address], [address] [place] [address]   [phone]

## 2020-07-14 ENCOUNTER — HOSPITAL ENCOUNTER (OUTPATIENT)
Dept: LAB | Age: 55
Discharge: HOME | End: 2020-07-14

## 2020-07-14 LAB — XX-LABCORP SPECIMEN COL,LCBCF: NORMAL

## 2020-07-14 PROCEDURE — 99001 SPECIMEN HANDLING PT-LAB: CPT

## 2020-07-15 LAB
ABO GROUP BLD: ABNORMAL
AFP L3 MFR SERPL: NORMAL % (ref 0–9.9)
AFP SERPL-MCNC: 2.8 NG/ML (ref 0–8)
BASOPHILS # BLD AUTO: 0 X10E3/UL (ref 0–0.2)
BASOPHILS NFR BLD AUTO: 1 %
BLD GP AB SCN SERPL QL: NEGATIVE
BUN SERPL-MCNC: 73 MG/DL (ref 6–24)
BUN/CREAT SERPL: 14 (ref 9–20)
CALCIUM SERPL-MCNC: 8.7 MG/DL (ref 8.7–10.2)
CHLORIDE SERPL-SCNC: 102 MMOL/L (ref 96–106)
CO2 SERPL-SCNC: 18 MMOL/L (ref 20–29)
CREAT SERPL-MCNC: 5.29 MG/DL (ref 0.76–1.27)
EOSINOPHIL # BLD AUTO: 0.3 X10E3/UL (ref 0–0.4)
EOSINOPHIL NFR BLD AUTO: 7 %
ERYTHROCYTE [DISTWIDTH] IN BLOOD BY AUTOMATED COUNT: 15.7 % (ref 11.6–15.4)
GLUCOSE SERPL-MCNC: 116 MG/DL (ref 65–99)
HAV AB SER QL IA: POSITIVE
HBV CORE AB SERPL QL IA: NEGATIVE
HBV SURFACE AB SER QL: REACTIVE
HBV SURFACE AG SERPL QL IA: NEGATIVE
HCT VFR BLD AUTO: 34.5 % (ref 37.5–51)
HCV GENTYP SERPL NAA+PROBE: NORMAL
HCV RNA SERPL NAA+PROBE-ACNC: NORMAL IU/ML
HCV RNA SERPL NAA+PROBE-LOG IU: 5.78 LOG10 IU/ML
HCV RNA SERPL QL NAA+PROBE: POSITIVE
HGB BLD-MCNC: 11.1 G/DL (ref 13–17.7)
IMM GRANULOCYTES # BLD AUTO: 0 X10E3/UL (ref 0–0.1)
IMM GRANULOCYTES NFR BLD AUTO: 0 %
INR PPP: 1.1 (ref 0.8–1.2)
LYMPHOCYTES # BLD AUTO: 1.5 X10E3/UL (ref 0.7–3.1)
LYMPHOCYTES NFR BLD AUTO: 32 %
MCH RBC QN AUTO: 23.4 PG (ref 26.6–33)
MCHC RBC AUTO-ENTMCNC: 32.2 G/DL (ref 31.5–35.7)
MCV RBC AUTO: 73 FL (ref 79–97)
MONOCYTES # BLD AUTO: 0.5 X10E3/UL (ref 0.1–0.9)
MONOCYTES NFR BLD AUTO: 12 %
NEUTROPHILS # BLD AUTO: 2.3 X10E3/UL (ref 1.4–7)
NEUTROPHILS NFR BLD AUTO: 48 %
PLATELET # BLD AUTO: 221 X10E3/UL (ref 150–450)
PLEASE NOTE, 550474: NORMAL
POTASSIUM SERPL-SCNC: 4.5 MMOL/L (ref 3.5–5.2)
PROTHROMBIN TIME: 11.5 SEC (ref 9.1–12)
RBC # BLD AUTO: 4.75 X10E6/UL (ref 4.14–5.8)
RH BLD: POSITIVE
RPR SER QL: NON REACTIVE
RUBV IGG SERPL IA-ACNC: 20.5 INDEX
SODIUM SERPL-SCNC: 138 MMOL/L (ref 134–144)
TEST INFORMATION: NORMAL
TSH SERPL DL<=0.005 MIU/L-ACNC: 3.82 UIU/ML (ref 0.45–4.5)
WBC # BLD AUTO: 4.6 X10E3/UL (ref 3.4–10.8)

## 2020-07-20 ENCOUNTER — HOSPITAL ENCOUNTER (OUTPATIENT)
Dept: ULTRASOUND IMAGING | Age: 55
Discharge: HOME | End: 2020-07-20
Payer: MEDICAID

## 2020-07-20 DIAGNOSIS — B18.2 HEP C W/O COMA, CHRONIC (HCC): ICD-10-CM

## 2020-07-20 PROCEDURE — 76981 USE PARENCHYMA: CPT

## 2020-07-22 ENCOUNTER — TELEPHONE (OUTPATIENT)
Dept: HEMATOLOGY | Age: 55
End: 2020-07-22

## 2020-08-15 ENCOUNTER — APPOINTMENT (OUTPATIENT)
Dept: VASCULAR SURGERY | Age: 55
End: 2020-08-15
Attending: EMERGENCY MEDICINE
Payer: MEDICAID

## 2020-08-15 ENCOUNTER — APPOINTMENT (OUTPATIENT)
Dept: GENERAL RADIOLOGY | Age: 55
End: 2020-08-15
Attending: EMERGENCY MEDICINE
Payer: MEDICAID

## 2020-08-15 ENCOUNTER — HOSPITAL ENCOUNTER (EMERGENCY)
Age: 55
Discharge: HOME | End: 2020-08-15
Attending: EMERGENCY MEDICINE
Payer: MEDICAID

## 2020-08-15 VITALS
HEART RATE: 66 BPM | BODY MASS INDEX: 27.49 KG/M2 | RESPIRATION RATE: 15 BRPM | DIASTOLIC BLOOD PRESSURE: 66 MMHG | TEMPERATURE: 98.5 F | HEIGHT: 65 IN | SYSTOLIC BLOOD PRESSURE: 123 MMHG | OXYGEN SATURATION: 100 % | WEIGHT: 165 LBS

## 2020-08-15 DIAGNOSIS — M25.472 EFFUSION OF LEFT ANKLE: ICD-10-CM

## 2020-08-15 DIAGNOSIS — M25.572 ARTHRALGIA OF LEFT ANKLE: Primary | ICD-10-CM

## 2020-08-15 PROCEDURE — 74011250636 HC RX REV CODE- 250/636: Performed by: EMERGENCY MEDICINE

## 2020-08-15 PROCEDURE — 73610 X-RAY EXAM OF ANKLE: CPT

## 2020-08-15 PROCEDURE — 96372 THER/PROPH/DIAG INJ SC/IM: CPT

## 2020-08-15 PROCEDURE — 93971 EXTREMITY STUDY: CPT

## 2020-08-15 PROCEDURE — 99283 EMERGENCY DEPT VISIT LOW MDM: CPT

## 2020-08-15 PROCEDURE — 74011250637 HC RX REV CODE- 250/637: Performed by: EMERGENCY MEDICINE

## 2020-08-15 RX ORDER — NAPROXEN 500 MG/1
500 TABLET ORAL
Qty: 20 TAB | Refills: 0 | Status: SHIPPED | OUTPATIENT
Start: 2020-08-15 | End: 2020-08-25

## 2020-08-15 RX ORDER — HYDROCODONE BITARTRATE AND ACETAMINOPHEN 5; 325 MG/1; MG/1
1 TABLET ORAL
Status: COMPLETED | OUTPATIENT
Start: 2020-08-15 | End: 2020-08-15

## 2020-08-15 RX ORDER — KETOROLAC TROMETHAMINE 15 MG/ML
15 INJECTION, SOLUTION INTRAMUSCULAR; INTRAVENOUS
Status: DISCONTINUED | OUTPATIENT
Start: 2020-08-15 | End: 2020-08-15 | Stop reason: ALTCHOICE

## 2020-08-15 RX ORDER — KETOROLAC TROMETHAMINE 15 MG/ML
15 INJECTION, SOLUTION INTRAMUSCULAR; INTRAVENOUS
Status: COMPLETED | OUTPATIENT
Start: 2020-08-15 | End: 2020-08-15

## 2020-08-15 RX ADMIN — KETOROLAC TROMETHAMINE 15 MG: 15 INJECTION, SOLUTION INTRAMUSCULAR; INTRAVENOUS at 16:44

## 2020-08-15 RX ADMIN — HYDROCODONE BITARTRATE AND ACETAMINOPHEN 1 TABLET: 5; 325 TABLET ORAL at 16:45

## 2020-08-15 NOTE — ED PROVIDER NOTES
EMERGENCY DEPARTMENT HISTORY AND PHYSICAL EXAM    Date: 8/15/2020  Patient Name: Tonny White    History of Presenting Illness     Chief Complaint   Patient presents with    Ankle Pain         History Provided By: Patient    [address] is a 53 y.o. male with PMHX of MI, stroke, peripheral vascular disease status post left iliac stent who presents to the emergency department C/O ankle pain. Patient tells me that he has had ankle pain for the past 2 days. Reports symptoms became worse today. Says he is unable to bear weight. Patient is ambulatory but has history of stroke with residual left-sided weakness. Denies injury or new trauma. Denies history of gout. States occasional chronic left lower extremity swelling unchanged. PCP: Fabricio Brown MD    Current Outpatient Medications   Medication Sig Dispense Refill    naproxen (Naprosyn) 500 mg tablet Take 1 Tab by mouth two (2) times daily as needed for Pain for up to 10 days. 20 Tab 0    atorvastatin (LIPITOR) 20 mg tablet Take  by mouth daily.  sodium bicarbonate 325 mg tablet Take 325 mg by mouth four (4) times daily.  ergocalciferol (ERGOCALCIFEROL) 1,250 mcg (50,000 unit) capsule Take 50,000 Units by mouth every seven (7) days.  isosorbide mononitrate ER (IMDUR) 30 mg tablet Take 1 Tab by mouth daily. 30 Tab 0    amLODIPine (Norvasc) 10 mg tablet Take 1 Tab by mouth daily. 30 Tab 0    carvedilol (COREG) 25 mg tablet Take 25 mg by mouth two (2) times daily (with meals).  aspirin (ASPIRIN) 325 mg tablet Take 325 mg by mouth daily.  Hydrochlorothiazide (HYDRODIURIL) 12.5 mg tablet Take 12.5 mg by mouth daily.  pravastatin (PRAVACHOL) 20 mg tablet Take 20 mg by mouth nightly.  clopidogrel (PLAVIX) 75 mg tablet Take 1 Tab by mouth daily. 90 Tab 3    nitroglycerin (NITROSTAT) 0.4 mg SL tablet 1 Tab by SubLINGual route every five (5) minutes as needed for Chest Pain.  25 Tab 4    levothyroxine (SYNTHROID) 25 mcg tablet Take 25 mcg by mouth Daily (before breakfast). Past History     Past Medical History:  Past Medical History:   Diagnosis Date    CAD (coronary artery disease)     Essential hypertension     Hypercholesterolemia     Hyperlipidemia     Hypertension     Myocardial infarct ([address]     Peripheral vascular disease (HCC)     s/p left iliac stenting x 3    Psychiatric disorder     depression    Stroke [facility])        Past Surgical History:  Past Surgical History:   Procedure Laterality Date    CARDIAC SURG PROCEDURE UNLIST      HX CORONARY STENT PLACEMENT      2 STENTS 2012    HX HEART CATHETERIZATION      HX PTCA         Family History:  Family History   Problem Relation Age of Onset    Heart Attack Father        Social History:  Social History     Tobacco Use    Smoking status: Former Smoker    Smokeless tobacco: Never Used   Substance Use Topics    Alcohol use: Not Currently     Comment: hx of excessive use    Drug use: Not Currently     Comment: 8 yrs clean       Allergies:  No Known Allergies      Review of Systems   Review of Systems   Constitutional: Negative for chills and fever. Musculoskeletal: Positive for arthralgias. Skin: Negative for wound. All other systems reviewed and are negative. Physical Exam     Vitals:    08/15/20 1354 08/15/20 1539   BP: 125/71 123/66   Pulse: 88 66   Resp:  15   Temp: 98.5 °F (36.9 °C) 98.5 °F (36.9 °C)   SpO2: 100% 100%   Weight: 74.8 kg (165 lb)    Height: 5' 5\" (1.651 m)      Physical Exam  Vitals signs and nursing note reviewed. Constitutional:       General: He is not in acute distress. Appearance: Normal appearance. HENT:      Head: Normocephalic and atraumatic. Eyes:      Extraocular Movements: Extraocular movements intact. Conjunctiva/sclera: Conjunctivae normal.   Neck:      Musculoskeletal: Normal range of motion. Cardiovascular:      Rate and Rhythm: Normal rate and regular rhythm.    Pulmonary: Effort: Pulmonary effort is normal. No respiratory distress. Musculoskeletal: Normal range of motion. General: No deformity. Left ankle: He exhibits swelling. He exhibits normal range of motion and normal pulse. Tenderness. Comments: Left ankle effusion, tenderness over medial aspect of ankle, range of motion intact, full range of motion, no erythema   Neurological:      General: No focal deficit present. Mental Status: He is alert and oriented to person, place, and time. Mental status is at baseline. Psychiatric:         Mood and Affect: Mood normal.         Behavior: Behavior normal.         Diagnostic Study Results     Labs -   No results found for this or any previous visit (from the past 12 hour(s)). Radiologic Studies -   XR ANKLE LT MIN 3 V   Final Result   IMPRESSION:         1. No evidence for acute fracture or dislocation. CT Results  (Last 48 hours)    None        CXR Results  (Last 48 hours)    None          Medications given in the ED-  Medications   HYDROcodone-acetaminophen (NORCO) 5-325 mg per tablet 1 Tab (1 Tab Oral Given 8/15/20 1645)   ketorolac (TORADOL) injection 15 mg (15 mg IntraMUSCular Given 8/15/20 1644)         Medical Decision Making   I am the first provider for this patient. I reviewed the vital signs, available nursing notes, past medical history, past surgical history, family history and social history. Vital Signs-Reviewed the patient's vital signs. Pulse Oximetry Analysis and Interpretation:   100% on RA, normal      Records Reviewed: Nursing Notes and Old Medical Records    Provider Notes (Medical Decision Making): Gurpreet Nobles is a 53 y.o. male here with left ankle pain and swelling. Patient with minor left ankle effusion. Not septic joint. Patient ambulatory with pain he was able to put on his pants socks and shoes today. He denies trauma. Ankle film negative for acute bony injury. DVT ultrasound study negative for clot. Will treat as arthritic joint effusion. Also gout remains on differential.  Discussed this with patient denies history of gout other extremities. Ankle pain is not exquisitely red or warm so more likely simple arthritis. Normal distal circulation. Procedures:  Procedures    ED Course:       Diagnosis and Disposition     Critical Care:     DISCHARGE NOTE:    Ptee Lebron's  results have been reviewed with him. He has been counseled regarding his diagnosis, treatment, and plan. He verbally conveys understanding and agreement of the signs, symptoms, diagnosis, treatment and prognosis and additionally agrees to follow up as discussed. He also agrees with the care-plan and conveys that all of his questions have been answered. I have also provided discharge instructions for him that include: educational information regarding their diagnosis and treatment, and list of reasons why they would want to return to the ED prior to their follow-up appointment, should his condition change. He has been provided with education for proper emergency department utilization. CLINICAL IMPRESSION:    1. Arthralgia of left ankle    2. Effusion of left ankle        PLAN:  1. D/C Home  2. Current Discharge Medication List      START taking these medications    Details   naproxen (Naprosyn) 500 mg tablet Take 1 Tab by mouth two (2) times daily as needed for Pain for up to 10 days. Qty: 20 Tab, Refills: 0           3.    Follow-up Information     Follow up With Specialties Details Why Contact Info    Garcia Weller MD Family Medicine Schedule an appointment as soon as possible for a visit  For primary care follow up [facility] [address] [facility]      Juan A Thurston MD Orthopedic Surgery Schedule an appointment as soon as possible for a visit  Orthopedics, as needed [address]      [facility] Emergency Medicine  If symptoms worsen [address] [place] [address]  [phone]        _______________________________      Please note that this dictation was completed with Blinkit, the computer voice recognition software. Quite often unanticipated grammatical, syntax, homophones, and other interpretive errors are inadvertently transcribed by the computer software. Please disregard these errors. Please excuse any errors that have escaped final proofreading.

## 2020-08-15 NOTE — ED NOTES
Pt refused crutches d/t unable to utilized LEFT arm d/t previous left sided deficit post stroke from 2014. MD made aware.

## 2020-08-26 ENCOUNTER — TELEPHONE (OUTPATIENT)
Dept: HEMATOLOGY | Age: 55
End: 2020-08-26

## 2020-08-26 NOTE — TELEPHONE ENCOUNTER
Explained to the patient that we will hold off on ordering HCV treatment medications at this time. It appears that the patient may be spontaneously resolving the HCV. His viral load has dropped from 1,860,000 U/mL in 02/2020 to 598,000 in 07/2020. He has a scheduled follow up here in September. Will re-assess the need for antiviral therapy at that time.

## 2020-08-27 ENCOUNTER — HOSPITAL ENCOUNTER (OUTPATIENT)
Dept: LAB | Age: 55
Discharge: HOME | End: 2020-08-27

## 2020-08-27 LAB — XX-LABCORP SPECIMEN COL,LCBCF: NORMAL

## 2020-08-27 PROCEDURE — 99001 SPECIMEN HANDLING PT-LAB: CPT

## 2020-09-08 ENCOUNTER — HOSPITAL ENCOUNTER (OUTPATIENT)
Dept: LAB | Age: 55
Discharge: HOME | End: 2020-09-08

## 2020-09-08 ENCOUNTER — HOSPITAL ENCOUNTER (OUTPATIENT)
Dept: GENERAL RADIOLOGY | Age: 55
Discharge: HOME | End: 2020-09-08
Attending: INTERNAL MEDICINE
Payer: MEDICAID

## 2020-09-08 DIAGNOSIS — N18.5 CHRONIC KIDNEY DISEASE, STAGE V (HCC): ICD-10-CM

## 2020-09-08 LAB — XX-LABCORP SPECIMEN COL,LCBCF: NORMAL

## 2020-09-08 PROCEDURE — 99001 SPECIMEN HANDLING PT-LAB: CPT

## 2020-09-08 PROCEDURE — 71046 X-RAY EXAM CHEST 2 VIEWS: CPT

## 2020-09-15 ENCOUNTER — OFFICE VISIT (OUTPATIENT)
Dept: HEMATOLOGY | Age: 55
End: 2020-09-15

## 2020-09-15 ENCOUNTER — HOSPITAL ENCOUNTER (OUTPATIENT)
Dept: LAB | Age: 55
Discharge: HOME | End: 2020-09-15

## 2020-09-15 VITALS
TEMPERATURE: 97.1 F | WEIGHT: 163 LBS | BODY MASS INDEX: 27.12 KG/M2 | DIASTOLIC BLOOD PRESSURE: 80 MMHG | HEART RATE: 62 BPM | OXYGEN SATURATION: 98 % | SYSTOLIC BLOOD PRESSURE: 138 MMHG

## 2020-09-15 DIAGNOSIS — B18.2 CHRONIC HEPATITIS C WITHOUT HEPATIC COMA (HCC): ICD-10-CM

## 2020-09-15 DIAGNOSIS — B18.2 CHRONIC HEPATITIS C WITHOUT HEPATIC COMA (HCC): Primary | ICD-10-CM

## 2020-09-15 LAB — XX-LABCORP SPECIMEN COL,LCBCF: NORMAL

## 2020-09-15 PROCEDURE — 99001 SPECIMEN HANDLING PT-LAB: CPT

## 2020-09-15 RX ORDER — CALCITRIOL 0.25 UG/1
0.25 CAPSULE ORAL DAILY
COMMUNITY
Start: 2020-05-01 | End: 2020-12-15

## 2020-09-15 NOTE — PROGRESS NOTES
[address] [place] [address], MD, MD Monika Chen PA-C Romilda Mines, ACNP-BC     April S Schuyler, AGPCNP-BC   DANIEL BlancaP-HATTIE Anderson, [facility]       Shiv Espinoza [address]    at [address], [address] [place] [address], [facility].    [phone]    FAX: [address] [place] [address], [address]    [phone]    FAX: [phone]       Patient Care Team:  Tyshawn Hoffman MD as PCP - General (Family Medicine)  Rocael Ly MD (Nephrology)      Problem List  Date Reviewed: 7/6/2020          Codes Class Noted    Hep C w/o coma, chronic ([facility] ICD-10-CM: B18.2  ICD-9-CM: 070.54  7/6/2020        Hypoalbuminemia ICD-10-CM: E88.09  ICD-9-CM: 273.8  4/29/2020        Acute on chronic renal failure [facility]) ICD-10-CM: N17.9, N18.9  ICD-9-CM: 584.9, 585.9  4/28/2020        Peripheral vascular disease ([facility] ICD-10-CM: I73.9  ICD-9-CM: 443.9  3/24/2015        Hypertension ICD-10-CM: I10  ICD-9-CM: 401.9  3/24/2015        Chest pain, unspecified ICD-10-CM: R07.9  ICD-9-CM: 786.50  10/15/2013        High cholesterol ICD-10-CM: E78.00  ICD-9-CM: 272.0  10/15/2013        H/O: CVA (cerebrovascular accident) ICD-10-CM: Z86.73  ICD-9-CM: V12.54  10/15/2013        H/O acute myocardial infarction ICD-10-CM: I25.2  ICD-9-CM: 412  10/15/2013        S/P angioplasty with stent ICD-10-CM: Z95.820  ICD-9-CM: V45.89  10/15/2013        S/P insertion of iliac artery stent ICD-10-CM: Z95.828  ICD-9-CM: V45.89  10/15/2013    Overview Signed 10/15/2013  6:50 AM by Sam Rascon     left             CAD (coronary artery disease), native coronary artery ICD-10-CM: I25.10  ICD-9-CM: 414.01  5/12/2012        PVD (peripheral vascular disease) with claudication ([facility] ICD-10-CM: I73.9  ICD-9-CM: 443.9  5/12/2012        HBP (high blood pressure) ICD-10-CM: I10  ICD-9-CM: 401.9  5/12/2012              Raeann Toney returns to the [place] [address] today for education and management of chronic v. acute HCV. The active problem list, all pertinent past medical history, medications, liver histology, endoscopic studies, radiologic findings and laboratory findings related to the liver disorder were reviewed with the patient. The patient is a 63 y.o. Black male who was found to have abnormalities in liver chemistries and subsequently tested positive for chronic HCV who was screened for anti-HCV and tested positive after being incarcerated in early late 1980's. Risk factors for acquiring HCV are IV drug use in late 1980's. There was no history of acute icteric hepatitis at the time of these risk factors. CT scan of the liver was performed in 12/2019. The results of the imaging demonstrated a normal appearing liver. An assessment of liver fibrosis with shear wave elastography was performed in 07/2020. Results indicate a Metavir fibrosis score of F1, jonh to mild hepatic fibrosis. Ultrasound was also performed in 07/2020. No hepatic masses. The patient has never received treatment for chronic HCV. The patient has no symptoms which can be attributed to the liver disorder. The patient completes all daily activities without any functional limitations. The patient has not experienced fatigue, fevers, chills, shortness of breath, chest pain, pain in the right side over the liver, diffuse abdominal pain, nausea, vomiting, constipation, diarrhrea, dry eyes, dry mouth, arthralgias, myalgias, yellowing of the eyes or skin, itching, dark urine, problems concentrating, swelling of the abdomen, swelling of the lower extremities, hematemesis, or hematochezia.     Since the last office appointment the patient:  Had no changes in the liver disease. Has started HD (start date 09/14/2020) with sessions on Monday, Wednesday, and Friday. ASSESSMENT AND PLAN:  Chronic HCV   Chronic HCV of unclear severity. The most recent laboratory studies indicate that the liver transaminases are normal, alkaline phosphatase is normal, tests of hepatic synthetic and metabolic function are normal, and the platelet count is normal.      Based upon laboratory studies, imaging, and shear wave elastography, the patient does not have significant liver injury. Will perform laboratory testing to monitor liver function and degree of liver injury. Chronic HCV Treatment  The patient has not been treated for HCV and has genotype 1A. I explained to the patient that we will hold off on ordering HCV treatment medications at this time. It appears that the patient may be spontaneously resolving the HCV. His viral load has dropped from 1,860,000 U/mL in 02/2020 to 598,000 in 07/2020. Will re-assess the need for antiviral therapy today. If the patient has not spontaneously resolved the HCV infection, he should be treated with Epclusa (sofosbuvir and valpitasvir) for 12 weeks. This treatment regime was explained in detail, including dosing and side effects. Educational material was provided. Will await today's labs before ordering the antiviral medication if needed. Screening for Hepatocellular Carcinoma  HCC screening has recently been performed and does not suggest [address] Both an AFP-L3% and an ultrasound were unremarkable. Treatment of other medical problems in patients with chronic liver disease  There are no contraindications for the patient to take most medications that are necessary for treatment of other medical issues. The patient does not comsume alcohol on a daily basis.   Quit drinking \"about 4 months, maybe more\"   Normal doses of acetaminophen, as recommended on the label of the bottle, are not hepatotoxic except in the setting of daily alcohol use, even in patients with cirrhosis and can be utilized for pain. Counseling for alcohol in patients with chronic liver disease  The patient was counseled regarding alcohol consumption and the effect of alcohol on chronic liver disease. The patient has cirrhosis and was advised to be abstinent from all alcohol including non-alcoholic beer which does contain some alcohol. Substance Use  The patient has not used drugs since late 1980's. Vaccinations   Vaccination for viral hepatitis A and B is not required. The patient has serologic evidence of prior exposure or vaccination with immunity. Routine vaccinations against other bacterial and viral agents can be performed as indicated. Annual flu vaccination should be administered if indicated. ALLERGIES  No Known Allergies    MEDICATIONS  Current Outpatient Medications   Medication Sig    atorvastatin (LIPITOR) 20 mg tablet Take  by mouth daily.  sodium bicarbonate 325 mg tablet Take 325 mg by mouth four (4) times daily.  ergocalciferol (ERGOCALCIFEROL) 1,250 mcg (50,000 unit) capsule Take 50,000 Units by mouth every seven (7) days.  isosorbide mononitrate ER (IMDUR) 30 mg tablet Take 1 Tab by mouth daily.  amLODIPine (Norvasc) 10 mg tablet Take 1 Tab by mouth daily.  carvedilol (COREG) 25 mg tablet Take 25 mg by mouth two (2) times daily (with meals).  aspirin (ASPIRIN) 325 mg tablet Take 325 mg by mouth daily.  Hydrochlorothiazide (HYDRODIURIL) 12.5 mg tablet Take 12.5 mg by mouth daily.  pravastatin (PRAVACHOL) 20 mg tablet Take 20 mg by mouth nightly.  clopidogrel (PLAVIX) 75 mg tablet Take 1 Tab by mouth daily.  nitroglycerin (NITROSTAT) 0.4 mg SL tablet 1 Tab by SubLINGual route every five (5) minutes as needed for Chest Pain.  levothyroxine (SYNTHROID) 25 mcg tablet Take 25 mcg by mouth Daily (before breakfast).        No current facility-administered medications for this visit. SYSTEM REVIEW NOT RELATED TO LIVER DISEASE OR REVIEWED ABOVE:  Constitution systems: Negative for fever, chills, weight gain, weight loss. Eyes: Negative for visual changes. ENT: Negative for sore throat, painful swallowing. Respiratory: Negative for cough, hemoptysis, SOB. Cardiology: Negative for chest pain, palpitations. GI:  Negative for constipation or diarrhea. : Negative for urinary frequency, dysuria, hematuria, nocturia. Skin: Negative for rash. Hematology: Negative for easy bruising, blood clots. Musculo-skelatal: Negative for back pain, muscle pain, weakness. Neurologic: Negative for headaches, dizziness, vertigo, memory problems not related to HE. Psychology: Negative for anxiety, depression. FAMILY HISTORY:  The father  of heart failure at age 67. The mother  of CVA in  at age 61. There is no family history of liver disease. There is no family history of immune disorders. SOCIAL HISTORY:  The patient has never been . The patient has 1 child and 4 grandchildren. The patient stopped using tobacco products in \"4 or 5 months ago\"  The patient has been abstinent from alcohol since \"4 or 5 months ago\"    The patient does not work. The patient is currently receiving disability. PHYSICAL EXAMINATION:  Visit Vitals  /80   Pulse 62   Temp 97.1 °F (36.2 °C) (Tympanic)   Wt 163 lb (73.9 kg)   SpO2 98%   BMI 27.12 kg/m²     General: No acute distress. Eyes: Sclera anicteric. ENT: No oral lesions. Thyroid normal.  Nodes: No adenopathy. Skin: No spider angiomata. No jaundice. No palmar erythema. Respiratory: Lungs clear to auscultation. Cardiovascular: Regular heart rate. No murmurs. No JVD. Abdomen: Soft non-tender. Liver size normal to percussion/palpation. Spleen not palpable. No obvious ascites. Extremities: No edema. No muscle wasting. No gross arthritic changes. Neurologic: Alert and oriented. Cranial nerves grossly intact. No asterixis. LABORATORY STUDIES:  From 09/2020 [facility])  AST/ ALT/ ALP/ T. BILI/ ALB:  31/ 28/ 86/ 0.2/ 3.7  BUN/CRT/ PLT: 68/ 5.83/  197,000. From 05/2020 [facility])  AST/ ALT/ ALP/ T. BILI/ ALB:  22/ 27/ 73/ 0.2/ 4.2  BUN/ CRT:  67/ 4.84  PLT:  211,000    SEROLOGIES:  Serologies Latest Ref Rng & Units 7/6/2020   Hep A Ab, Total Negative Positive (A)   Hep B Surface Ag Negative Negative   Hep B Core Ab, Total Negative Negative   Hep B Surface AB QL  Reactive   Hep C Genotype  1a   HCV RT-PCR, Quant IU/mL 598,000   LIANA, IFA       LIVER HISTOLOGY:  TRANSIENT HEPATIC ELASTOGRAPHY:   E Range: 5.5-9.1 kPa  E Mean: 6.7 kPa  E Median: 6.2 kPa  E Std: 1.2 kPa    ENDOSCOPIC PROCEDURES:  Not available or performed    RADIOLOGY:  12/2019. Abdominal/pelvic CT wo contrast.  The liver, gallbladder, spleen, pancreas and adrenal glands are grossly normal.  07/2020. Ultrasound of the liver. Diffusely heterogeneous echotexture. No focal mass. OTHER TESTING:  Not available or performed    FOLLOW-UP:  All of the issues listed above in the Assessment and Plan were discussed with the patient. All questions were answered. The patient expressed a clear understanding of the above. [address] in 12 weeks. If indicated, the patient should be on antiviral therapy by then.     TRISHA Lala-HATTIE  [facility] [place] [address], [address] [place] [address], [address] [place] [address]   [phone]

## 2020-09-17 LAB
ALBUMIN SERPL-MCNC: 3.8 G/DL (ref 3.8–4.9)
ALP SERPL-CCNC: 84 IU/L (ref 39–117)
ALT SERPL-CCNC: 20 IU/L (ref 0–44)
AST SERPL-CCNC: 30 IU/L (ref 0–40)
BASOPHILS # BLD AUTO: 0 X10E3/UL (ref 0–0.2)
BASOPHILS NFR BLD AUTO: 1 %
BILIRUB DIRECT SERPL-MCNC: 0.1 MG/DL (ref 0–0.4)
BILIRUB SERPL-MCNC: 0.3 MG/DL (ref 0–1.2)
BUN SERPL-MCNC: 36 MG/DL (ref 6–24)
BUN/CREAT SERPL: 8 (ref 9–20)
CALCIUM SERPL-MCNC: 8.7 MG/DL (ref 8.7–10.2)
CHLORIDE SERPL-SCNC: 99 MMOL/L (ref 96–106)
CO2 SERPL-SCNC: 24 MMOL/L (ref 20–29)
CREAT SERPL-MCNC: 4.51 MG/DL (ref 0.76–1.27)
EOSINOPHIL # BLD AUTO: 0.2 X10E3/UL (ref 0–0.4)
EOSINOPHIL NFR BLD AUTO: 5 %
ERYTHROCYTE [DISTWIDTH] IN BLOOD BY AUTOMATED COUNT: 14.7 % (ref 11.6–15.4)
GLUCOSE SERPL-MCNC: 200 MG/DL (ref 65–99)
HCT VFR BLD AUTO: 32.9 % (ref 37.5–51)
HCV RNA SERPL NAA+PROBE-ACNC: NORMAL IU/ML
HCV RNA SERPL NAA+PROBE-LOG IU: 6.19 LOG10 IU/ML
HGB BLD-MCNC: 10.3 G/DL (ref 13–17.7)
IMM GRANULOCYTES # BLD AUTO: 0 X10E3/UL (ref 0–0.1)
IMM GRANULOCYTES NFR BLD AUTO: 0 %
LYMPHOCYTES # BLD AUTO: 1 X10E3/UL (ref 0.7–3.1)
LYMPHOCYTES NFR BLD AUTO: 28 %
MCH RBC QN AUTO: 22.7 PG (ref 26.6–33)
MCHC RBC AUTO-ENTMCNC: 31.3 G/DL (ref 31.5–35.7)
MCV RBC AUTO: 73 FL (ref 79–97)
MONOCYTES # BLD AUTO: 0.5 X10E3/UL (ref 0.1–0.9)
MONOCYTES NFR BLD AUTO: 14 %
NEUTROPHILS # BLD AUTO: 1.9 X10E3/UL (ref 1.4–7)
NEUTROPHILS NFR BLD AUTO: 52 %
PLATELET # BLD AUTO: 155 X10E3/UL (ref 150–450)
POTASSIUM SERPL-SCNC: 4 MMOL/L (ref 3.5–5.2)
PROT SERPL-MCNC: 7 G/DL (ref 6–8.5)
RBC # BLD AUTO: 4.53 X10E6/UL (ref 4.14–5.8)
SODIUM SERPL-SCNC: 140 MMOL/L (ref 134–144)
TEST INFORMATION: NORMAL
WBC # BLD AUTO: 3.6 X10E3/UL (ref 3.4–10.8)

## 2020-09-25 RX ORDER — VELPATASVIR AND SOFOSBUVIR 100; 400 MG/1; MG/1
1 TABLET, FILM COATED ORAL DAILY
Qty: 28 TAB | Refills: 2 | Status: SHIPPED | OUTPATIENT
Start: 2020-09-25 | End: 2020-12-18

## 2020-12-15 ENCOUNTER — OFFICE VISIT (OUTPATIENT)
Dept: HEMATOLOGY | Age: 55
End: 2020-12-15
Payer: MEDICAID

## 2020-12-15 ENCOUNTER — HOSPITAL ENCOUNTER (OUTPATIENT)
Dept: LAB | Age: 55
Discharge: HOME | End: 2020-12-15

## 2020-12-15 VITALS
OXYGEN SATURATION: 98 % | HEIGHT: 65 IN | DIASTOLIC BLOOD PRESSURE: 88 MMHG | SYSTOLIC BLOOD PRESSURE: 128 MMHG | HEART RATE: 69 BPM | TEMPERATURE: 96 F | WEIGHT: 162 LBS | BODY MASS INDEX: 26.99 KG/M2 | RESPIRATION RATE: 16 BRPM

## 2020-12-15 DIAGNOSIS — B18.2 CHRONIC HEPATITIS C WITHOUT HEPATIC COMA (HCC): Primary | ICD-10-CM

## 2020-12-15 DIAGNOSIS — B18.2 CHRONIC HEPATITIS C WITHOUT HEPATIC COMA (HCC): ICD-10-CM

## 2020-12-15 LAB — XX-LABCORP SPECIMEN COL,LCBCF: NORMAL

## 2020-12-15 PROCEDURE — 99214 OFFICE O/P EST MOD 30 MIN: CPT | Performed by: NURSE PRACTITIONER

## 2020-12-15 PROCEDURE — 99001 SPECIMEN HANDLING PT-LAB: CPT

## 2020-12-15 RX ORDER — ROSUVASTATIN CALCIUM 10 MG/1
10 TABLET, COATED ORAL DAILY
COMMUNITY

## 2020-12-15 NOTE — PROGRESS NOTES
[address] [facility], MD, MD Hipolito Salinas, DELLA Domínguez, [facility]-BC     Dawna Payan, [facility]   Opal Currie FNP-C    Prabhjot Coleman, [facility]       Shiv Deputado Alexis De Braun 136    at [address], [address] [place] [address], [facility] 22.    [phone]    FAX: [address] [place] [address], [address]    [phone]    FAX: [phone]       Patient Care Team:  Rhea Herrmann MD as PCP - General (Family Medicine)  Felisha Torres MD (Nephrology)      Problem List  Date Reviewed: 7/6/2020          Codes Class Noted    Hep C w/o coma, chronic ([facility] ICD-10-CM: B18.2  ICD-9-CM: 070.54  7/6/2020        Hypoalbuminemia ICD-10-CM: E88.09  ICD-9-CM: 273.8  4/29/2020        Acute on chronic renal failure [facility]) ICD-10-CM: N17.9, N18.9  ICD-9-CM: 584.9, 585.9  4/28/2020        Peripheral vascular disease ([facility] ICD-10-CM: I73.9  ICD-9-CM: 443.9  3/24/2015        Hypertension ICD-10-CM: I10  ICD-9-CM: 401.9  3/24/2015        Chest pain, unspecified ICD-10-CM: R07.9  ICD-9-CM: 786.50  10/15/2013        High cholesterol ICD-10-CM: E78.00  ICD-9-CM: 272.0  10/15/2013        H/O: CVA (cerebrovascular accident) ICD-10-CM: Z86.73  ICD-9-CM: V12.54  10/15/2013        H/O acute myocardial infarction ICD-10-CM: I25.2  ICD-9-CM: 412  10/15/2013        S/P angioplasty with stent ICD-10-CM: Z95.820  ICD-9-CM: V45.89  10/15/2013        S/P insertion of iliac artery stent ICD-10-CM: Z95.828  ICD-9-CM: V45.89  10/15/2013    Overview Signed 10/15/2013  6:50 AM by Elise Rascon     left             CAD (coronary artery disease), native coronary artery ICD-10-CM: I25.10  ICD-9-CM: 414.01  5/12/2012        PVD (peripheral vascular disease) with claudication ([facility] ICD-10-CM: I73.9  ICD-9-CM: 443.9  5/12/2012        HBP (high blood pressure) ICD-10-CM: I10  ICD-9-CM: 401.9  5/12/2012              Conor Mann returns to the [place] [address] today for education and management of chronic v. acute HCV. He began antiviral therapy 9 weeks ago, around 10/13/2020. He is being treated with Epclusa. This is the only time the HCV has ever been treated. The active problem list, all pertinent past medical history, medications, liver histology, endoscopic studies, radiologic findings and laboratory findings related to the liver disorder were reviewed with the patient. The patient is a 63 y.o. Black male who was found to have abnormalities in liver chemistries and subsequently tested positive for chronic HCV who was screened for anti-HCV and tested positive after being incarcerated in early late 1980's. Risk factors for acquiring HCV are IV drug use in late 1980's. There was no history of acute icteric hepatitis at the time of these risk factors. CT scan of the liver was performed in 12/2019. The results of the imaging demonstrated a normal appearing liver. An assessment of liver fibrosis with shear wave elastography was performed in 07/2020. Results indicate a Metavir fibrosis score of F1, normal to mild hepatic fibrosis. Ultrasound was also performed in 07/2020. No hepatic masses. The patient has no symptoms which can be attributed to the liver disorder. The patient completes all daily activities without any functional limitations.  The patient has not experienced fatigue, fevers, chills, shortness of breath, chest pain, pain in the right side over the liver, diffuse abdominal pain, nausea, vomiting, constipation, diarrhrea, dry eyes, dry mouth, arthralgias, myalgias, yellowing of the eyes or skin, itching, dark urine, problems concentrating, swelling of the abdomen, swelling of the lower extremities, hematemesis, or hematochezia. Since the last office appointment the patient:  Had no changes in the liver disease. Has started HD (start date 09/14/2020) with sessions on Monday, Wednesday, and Friday. Started 12 weeks of Epclusa on 10/13/2020. ASSESSMENT AND PLAN:  Chronic HCV   Chronic HCV with normal to mild hepatic fibrosis. The most recent laboratory studies indicate that the liver transaminases are normal, alkaline phosphatase is normal, tests of hepatic synthetic and metabolic function are normal, and the platelet count is normal.      Based upon laboratory studies, imaging, and shear wave elastography, the patient does not have significant liver injury. Will perform laboratory testing to monitor liver function and degree of liver injury. Chronic HCV Treatment  The patient has genotype 1A. He began antiviral therapy 9 weeks ago, around 10/13/2020. He is being treated with Epclusa. This is the only time the HCV has ever been treated. He denies missing any doses of the Epclusa and he has no treatment related complaints. Screening for Hepatocellular Carcinoma  HCC screening has recently been performed and does not suggest [address] Both an AFP-L3% and an ultrasound were unremarkable. Treatment of other medical problems in patients with chronic liver disease  There are no contraindications for the patient to take most medications that are necessary for treatment of other medical issues. The patient does not comsume alcohol on a daily basis. Quit drinking sometime around May, 2020. Normal doses of acetaminophen, as recommended on the label of the bottle, are not hepatotoxic except in the setting of daily alcohol use, even in patients with cirrhosis and can be utilized for pain. Counseling for alcohol in patients with chronic liver disease  The patient was counseled regarding alcohol consumption and the effect of alcohol on chronic liver disease.   The patient has cirrhosis and was advised to be abstinent from all alcohol including non-alcoholic beer which does contain some alcohol. Substance Use  The patient has not used drugs since late 1980's. Vaccinations   Vaccination for viral hepatitis A and B is not required. The patient has serologic evidence of prior exposure or vaccination with immunity. Routine vaccinations against other bacterial and viral agents can be performed as indicated. Annual flu vaccination should be administered if indicated. ALLERGIES  No Known Allergies    MEDICATIONS  Current Outpatient Medications   Medication Sig    sofosbuvir-velpatasvir (EPCLUSA) 400-100 mg tablet Take 1 Tab by mouth daily for 84 days. Indications: chronic infection of genotype 1 hepatitis C virus    calcitRIOL (ROCALTROL) 0.25 mcg capsule Take 0.25 mcg by mouth daily.  atorvastatin (LIPITOR) 20 mg tablet Take  by mouth daily.  sodium bicarbonate 325 mg tablet Take 325 mg by mouth four (4) times daily.  ergocalciferol (ERGOCALCIFEROL) 1,250 mcg (50,000 unit) capsule Take 50,000 Units by mouth every seven (7) days.  isosorbide mononitrate ER (IMDUR) 30 mg tablet Take 1 Tab by mouth daily.  amLODIPine (Norvasc) 10 mg tablet Take 1 Tab by mouth daily.  carvedilol (COREG) 25 mg tablet Take 25 mg by mouth two (2) times daily (with meals).  aspirin (ASPIRIN) 325 mg tablet Take 325 mg by mouth daily.  Hydrochlorothiazide (HYDRODIURIL) 12.5 mg tablet Take 12.5 mg by mouth daily.  clopidogrel (PLAVIX) 75 mg tablet Take 1 Tab by mouth daily.  nitroglycerin (NITROSTAT) 0.4 mg SL tablet 1 Tab by SubLINGual route every five (5) minutes as needed for Chest Pain.  levothyroxine (SYNTHROID) 25 mcg tablet Take 25 mcg by mouth Daily (before breakfast). No current facility-administered medications for this visit.         SYSTEM REVIEW NOT RELATED TO LIVER DISEASE OR REVIEWED ABOVE:  Constitution systems: Negative for fever, chills, weight gain, weight loss. Eyes: Negative for visual changes. ENT: Negative for sore throat, painful swallowing. Respiratory: Negative for cough, hemoptysis, SOB. Cardiology: Negative for chest pain, palpitations. GI:  Negative for constipation or diarrhea. : Negative for urinary frequency, dysuria, hematuria, nocturia. Skin: Negative for rash. Hematology: Negative for easy bruising, blood clots. Musculo-skelatal: Negative for back pain, muscle pain, weakness. Neurologic: Negative for headaches, dizziness, vertigo, memory problems not related to HE. Psychology: Negative for anxiety, depression. FAMILY HISTORY:  The father  of heart failure at age 67. The mother  of CVA in 2006 at age 61. There is no family history of liver disease. There is no family history of immune disorders. SOCIAL HISTORY:  The patient has never been . The patient has 1 child and 4 grandchildren. The patient stopped using tobacco products in around May, 2020. The patient has been abstinent from alcohol since May, 2020. The patient does not work. The patient is currently receiving disability. PHYSICAL EXAMINATION:  Visit Vitals  /88 (BP 1 Location: Right arm, BP Patient Position: Sitting)   Pulse 69   Temp (!) 96 °F (35.6 °C)   Resp 16   Ht 5' 5\" (1.651 m)   Wt 162 lb (73.5 kg)   SpO2 98%   BMI 26.96 kg/m²     General: No acute distress. Eyes: Sclera anicteric. ENT: No oral lesions. Thyroid normal.  Nodes: No adenopathy. Skin: No spider angiomata. No jaundice. No palmar erythema. Respiratory: Lungs clear to auscultation. Cardiovascular: Regular heart rate. No murmurs. No JVD. Abdomen: Soft non-tender. Liver size normal to percussion/palpation. Spleen not palpable. No obvious ascites. Extremities: No edema. No muscle wasting. No gross arthritic changes. Neurologic: Alert and oriented. Cranial nerves grossly intact. No asterixis.     LABORATORY STUDIES:  Liver [place] of [address] [place] [address] Units 9/15/2020   WBC 3.4 - 10.8 x10E3/uL 3.6   ANC 1.4 - 7.0 x10E3/uL 1.9   HGB 13.0 - 17.7 g/dL 10.3 (L)    - 450 x10E3/uL 155   INR 0.8 - 1.2    AST 0 - 40 IU/L 30   ALT 0 - 44 IU/L 20   Alk Phos 39 - 117 IU/L 84   Bili, Total 0.0 - 1.2 mg/dL 0.3   Bili, Direct 0.00 - 0.40 mg/dL 0.10   Albumin 3.8 - 4.9 g/dL 3.8   BUN 6 - 24 mg/dL 36 (H)   Creat 0.76 - 1.27 mg/dL 4.51 (H)   Na 134 - 144 mmol/L 140   K 3.5 - 5.2 mmol/L 4.0   Cl 96 - 106 mmol/L 99   CO2 20 - 29 mmol/L 24   Glucose 65 - 99 mg/dL 200 (H)   Ammonia 11 - 32 UMOL/L      From 09/2020 [facility])  AST/ ALT/ ALP/ T. BILI/ ALB:  31/ 28/ 86/ 0.2/ 3.7  BUN/CRT/ PLT: 68/ 5.83/  197,000. From 05/2020 [facility])  AST/ ALT/ ALP/ T. BILI/ ALB:  22/ 27/ 73/ 0.2/ 4.2  BUN/ CRT:  67/ 4.84  PLT:  211,000    SEROLOGIES:  Serologies Latest Ref Rng & Units 7/6/2020   Hep A Ab, Total Negative Positive (A)   Hep B Surface Ag Negative Negative   Hep B Core Ab, Total Negative Negative   Hep B Surface AB QL  Reactive   Hep C Genotype  1a   HCV RT-PCR, Quant IU/mL 598,000   LIANA, IFA       LIVER HISTOLOGY:  TRANSIENT HEPATIC ELASTOGRAPHY:   E Range: 5.5-9.1 kPa  E Mean: 6.7 kPa  E Median: 6.2 kPa  E Std: 1.2 kPa    ENDOSCOPIC PROCEDURES:  Not available or performed    RADIOLOGY:  12/2019. Abdominal/pelvic CT wo contrast.  The liver, gallbladder, spleen, pancreas and adrenal glands are grossly normal.  07/2020. Ultrasound of the liver. Diffusely heterogeneous echotexture. No focal mass. OTHER TESTING:  Not available or performed    FOLLOW-UP:  All of the issues listed above in the Assessment and Plan were discussed with the patient. All questions were answered. The patient expressed a clear understanding of the above. [address] in 116 weeks to assess for SVR 12.      TRISHA Malone-HATTIE  [facility]  [address] [facility], [address] [facility], [address] [place] [address]   [phone]

## 2020-12-17 LAB
ALBUMIN SERPL-MCNC: 4.2 G/DL (ref 3.8–4.9)
ALP SERPL-CCNC: 99 IU/L (ref 39–117)
ALT SERPL-CCNC: 21 IU/L (ref 0–44)
AST SERPL-CCNC: 22 IU/L (ref 0–40)
BASOPHILS # BLD AUTO: 0 X10E3/UL (ref 0–0.2)
BASOPHILS NFR BLD AUTO: 1 %
BILIRUB DIRECT SERPL-MCNC: 0.06 MG/DL (ref 0–0.4)
BILIRUB SERPL-MCNC: <0.2 MG/DL (ref 0–1.2)
BUN SERPL-MCNC: 51 MG/DL (ref 6–24)
BUN/CREAT SERPL: 9 (ref 9–20)
CALCIUM SERPL-MCNC: 9.2 MG/DL (ref 8.7–10.2)
CHLORIDE SERPL-SCNC: 97 MMOL/L (ref 96–106)
CO2 SERPL-SCNC: 22 MMOL/L (ref 20–29)
CREAT SERPL-MCNC: 5.85 MG/DL (ref 0.76–1.27)
EOSINOPHIL # BLD AUTO: 0.2 X10E3/UL (ref 0–0.4)
EOSINOPHIL NFR BLD AUTO: 4 %
ERYTHROCYTE [DISTWIDTH] IN BLOOD BY AUTOMATED COUNT: 16.1 % (ref 11.6–15.4)
GLUCOSE SERPL-MCNC: 148 MG/DL (ref 65–99)
HCT VFR BLD AUTO: 37 % (ref 37.5–51)
HCV RNA SERPL QL NAA+PROBE: NEGATIVE
HGB BLD-MCNC: 11.6 G/DL (ref 13–17.7)
IMM GRANULOCYTES # BLD AUTO: 0 X10E3/UL (ref 0–0.1)
IMM GRANULOCYTES NFR BLD AUTO: 0 %
LYMPHOCYTES # BLD AUTO: 1.1 X10E3/UL (ref 0.7–3.1)
LYMPHOCYTES NFR BLD AUTO: 26 %
MCH RBC QN AUTO: 24.4 PG (ref 26.6–33)
MCHC RBC AUTO-ENTMCNC: 31.4 G/DL (ref 31.5–35.7)
MCV RBC AUTO: 78 FL (ref 79–97)
MONOCYTES # BLD AUTO: 0.4 X10E3/UL (ref 0.1–0.9)
MONOCYTES NFR BLD AUTO: 10 %
NEUTROPHILS # BLD AUTO: 2.5 X10E3/UL (ref 1.4–7)
NEUTROPHILS NFR BLD AUTO: 59 %
PLATELET # BLD AUTO: 219 X10E3/UL (ref 150–450)
POTASSIUM SERPL-SCNC: 4.1 MMOL/L (ref 3.5–5.2)
PROT SERPL-MCNC: 7.5 G/DL (ref 6–8.5)
RBC # BLD AUTO: 4.76 X10E6/UL (ref 4.14–5.8)
SODIUM SERPL-SCNC: 137 MMOL/L (ref 134–144)
WBC # BLD AUTO: 4.3 X10E3/UL (ref 3.4–10.8)

## 2021-04-20 ENCOUNTER — OFFICE VISIT (OUTPATIENT)
Dept: HEMATOLOGY | Age: 56
End: 2021-04-20
Payer: MEDICARE

## 2021-04-20 ENCOUNTER — HOSPITAL ENCOUNTER (OUTPATIENT)
Dept: LAB | Age: 56
Discharge: HOME | End: 2021-04-20

## 2021-04-20 VITALS
WEIGHT: 160 LBS | DIASTOLIC BLOOD PRESSURE: 68 MMHG | HEIGHT: 65 IN | SYSTOLIC BLOOD PRESSURE: 123 MMHG | BODY MASS INDEX: 26.66 KG/M2 | OXYGEN SATURATION: 95 % | HEART RATE: 65 BPM | RESPIRATION RATE: 16 BRPM | TEMPERATURE: 97.5 F

## 2021-04-20 DIAGNOSIS — B18.2 CHRONIC HEPATITIS C WITHOUT HEPATIC COMA (HCC): Primary | ICD-10-CM

## 2021-04-20 LAB — XX-LABCORP SPECIMEN COL,LCBCF: NORMAL

## 2021-04-20 PROCEDURE — 99001 SPECIMEN HANDLING PT-LAB: CPT

## 2021-04-20 PROCEDURE — 99214 OFFICE O/P EST MOD 30 MIN: CPT | Performed by: NURSE PRACTITIONER

## 2021-04-20 NOTE — PROGRESS NOTES
Karolina Lo MD, MD April Murry PA-C Cipriano Guy, [facility]     April S Schuyler, [facility]   Love Dsouza, FNP-HATTIE Anthony, [facility]       Shiv Herrrea [facility] 136    at [address], [address] [place] [address], [facility] 22.    [phone]    FAX: [address] [place] [address], [address], [address]    [phone]    FAX: [phone]       Patient Care Team:  Cholo Herbert MD as PCP - General (Family Medicine)  Adam Bliss MD (Nephrology)  Jayden Castro MD (Cardio Vascular Surgery)      Problem List  Date Reviewed: 12/15/2020          Codes Class Noted    Hep C w/o coma, chronic ([facility] ICD-10-CM: B18.2  ICD-9-CM: 070.54  7/6/2020        Hypoalbuminemia ICD-10-CM: E88.09  ICD-9-CM: 273.8  4/29/2020        Acute on chronic renal failure [facility]) ICD-10-CM: N17.9, N18.9  ICD-9-CM: 584.9, 585.9  4/28/2020        Peripheral vascular disease ([facility].) ICD-10-CM: I73.9  ICD-9-CM: 443.9  3/24/2015        Hypertension ICD-10-CM: I10  ICD-9-CM: 401.9  3/24/2015        Chest pain, unspecified ICD-10-CM: R07.9  ICD-9-CM: 786.50  10/15/2013        High cholesterol ICD-10-CM: E78.00  ICD-9-CM: 272.0  10/15/2013        H/O: CVA (cerebrovascular accident) ICD-10-CM: Z86.73  ICD-9-CM: V12.54  10/15/2013        H/O acute myocardial infarction ICD-10-CM: I25.2  ICD-9-CM: 412  10/15/2013        S/P angioplasty with stent ICD-10-CM: Z95.820  ICD-9-CM: V45.89  10/15/2013        S/P insertion of iliac artery stent ICD-10-CM: Z95.828  ICD-9-CM: V45.89  10/15/2013    Overview Signed 10/15/2013  6:50 AM by Kinza Lan     left             CAD (coronary artery disease), native coronary artery ICD-10-CM: I25.10  ICD-9-CM: 414.01  5/12/2012        PVD (peripheral vascular disease) with claudication ([facility] ICD-10-CM: I73.9  ICD-9-CM: 443.9  5/12/2012        HBP (high blood pressure) ICD-10-CM: I10  ICD-9-CM: 401.9  5/12/2012              Ela Mishra returns to the [employer] today for education and management of chronic v. acute HCV. He began antiviral therapy on 10/13/2020. He was treated with 12 weeks of Epclusa. He completed the antiviral regime on about 01/04/2021. This is the only time the HCV has ever been treated. The active problem list, all pertinent past medical history, medications, liver histology, endoscopic studies, radiologic findings and laboratory findings related to the liver disorder were reviewed with the patient. The patient is a 63 y.o. Black male who was found to have abnormalities in liver chemistries and subsequently tested positive for chronic HCV who was screened for anti-HCV and tested positive after being incarcerated in early late 1980's. Risk factors for acquiring HCV are IV drug use in late 1980's. There was no history of acute icteric hepatitis at the time of these risk factors. CT scan of the liver was performed in 12/2019. The results of the imaging demonstrated a normal appearing liver. An assessment of liver fibrosis with shear wave elastography was performed in 07/2020. Results indicate a Metavir fibrosis score of F1, normal to mild hepatic fibrosis. Ultrasound was also performed in 07/2020. No hepatic masses. The patient has no symptoms which can be attributed to the liver disorder. The patient completes all daily activities without any functional limitations.  The patient has not experienced fatigue, fevers, chills, shortness of breath, chest pain, pain in the right side over the liver, diffuse abdominal pain, nausea, vomiting, constipation, diarrhrea, dry eyes, dry mouth, arthralgias, myalgias, yellowing of the eyes or skin, itching, dark urine, problems concentrating, swelling of the abdomen, swelling of the lower extremities, hematemesis, or hematochezia. Since the last office appointment the patient:  Had no changes in the liver disease. Completed 12 weeks of Epclusa around 01/04/2021. ASSESSMENT AND PLAN:  Chronic HCV   Chronic HCV with normal to mild hepatic fibrosis. The most recent laboratory studies indicate that the liver transaminases are normal, alkaline phosphatase is normal, tests of hepatic synthetic and metabolic function are normal, and the platelet count is normal.      Based upon laboratory studies, imaging, and shear wave elastography, the patient does not have significant liver injury. Will perform laboratory testing to monitor liver function and degree of liver injury. Chronic HCV Treatment  The patient has genotype 1A. He began antiviral therapy around 10/13/2020. He was treated with Epclusa. This is the only time the HCV has ever been treated. He denies missing any doses of the Epclusa and he has no treatment related complaints. There was no detectable HCV RNA at treatment week #9. Fibroscan  The need to perform an assessment of liver fibrosis was discussed with the patient. The Fibroscan can assess liver fibrosis and determine if a patient has advanced fibrosis or cirrhosis without the need for liver biopsy. This will be performed at the next office visit in 8 months. The Fibroscan can be repeated annually or as often as clinically indicated to assess for fibrosis progression and/or regression. Screening for Hepatocellular Carcinoma  HCC screening has recently been performed and does not suggest [address] Both an AFP-L3% and an ultrasound were unremarkable.        Treatment of other medical problems in patients with chronic liver disease  There are no contraindications for the patient to take most medications that are necessary for treatment of other medical issues. The patient does not comsume alcohol on a daily basis. Quit drinking sometime around May, 2020. Normal doses of acetaminophen, as recommended on the label of the bottle, are not hepatotoxic except in the setting of daily alcohol use, even in patients with cirrhosis and can be utilized for pain. Counseling for alcohol in patients with chronic liver disease  The patient was counseled regarding alcohol consumption and the effect of alcohol on chronic liver disease. The patient has cirrhosis and was advised to be abstinent from all alcohol including non-alcoholic beer which does contain some alcohol. Substance Use  The patient has not used drugs since late 1980's. Vaccinations   Vaccination for viral hepatitis A and B is not required. The patient has serologic evidence of prior exposure or vaccination with immunity. Routine vaccinations against other bacterial and viral agents can be performed as indicated. Annual flu vaccination should be administered if indicated. ALLERGIES  No Known Allergies    MEDICATIONS  Current Outpatient Medications   Medication Sig    rosuvastatin (CRESTOR) 10 mg tablet Take 10 mg by mouth daily.  isosorbide mononitrate ER (IMDUR) 30 mg tablet Take 1 Tab by mouth daily.  amLODIPine (Norvasc) 10 mg tablet Take 1 Tab by mouth daily.  carvedilol (COREG) 25 mg tablet Take 25 mg by mouth two (2) times daily (with meals).  aspirin (ASPIRIN) 325 mg tablet Take 81 mg by mouth daily.  Hydrochlorothiazide (HYDRODIURIL) 12.5 mg tablet Take 12.5 mg by mouth daily.  clopidogrel (PLAVIX) 75 mg tablet Take 1 Tab by mouth daily.  nitroglycerin (NITROSTAT) 0.4 mg SL tablet 1 Tab by SubLINGual route every five (5) minutes as needed for Chest Pain.  levothyroxine (SYNTHROID) 25 mcg tablet Take 25 mcg by mouth Daily (before breakfast). No current facility-administered medications for this visit.         SYSTEM REVIEW NOT RELATED TO LIVER DISEASE OR REVIEWED ABOVE:  Constitution systems: Negative for fever, chills, weight gain, weight loss. Eyes: Negative for visual changes. ENT: Negative for sore throat, painful swallowing. Respiratory: Negative for cough, hemoptysis, SOB. Cardiology: Negative for chest pain, palpitations. GI:  Negative for constipation or diarrhea. : Negative for urinary frequency, dysuria, hematuria, nocturia. Skin: Negative for rash. Hematology: Negative for easy bruising, blood clots. Musculo-skelatal: Negative for back pain, muscle pain, weakness. Neurologic: Negative for headaches, dizziness, vertigo, memory problems not related to HE. Psychology: Negative for anxiety, depression. FAMILY HISTORY:  The father  of heart failure at age 67. The mother  of CVA in 2006 at age 61. There is no family history of liver disease. There is no family history of immune disorders. SOCIAL HISTORY:  The patient has never been . The patient has 1 child and 4 grandchildren. The patient stopped using tobacco products in around May, 2020. The patient has been abstinent from alcohol since May, 2020. The patient does not work. The patient is currently receiving disability. PHYSICAL EXAMINATION:  Visit Vitals  /68 (BP 1 Location: Right arm, BP Patient Position: Sitting, BP Cuff Size: Small infant)   Pulse 65   Temp 97.5 °F (36.4 °C)   Resp 16   Ht 5' 5\" (1.651 m)   Wt 160 lb (72.6 kg)   SpO2 95%   BMI 26.63 kg/m²     General: No acute distress. Eyes: Sclera anicteric. ENT: No oral lesions. Thyroid normal.  Nodes: No adenopathy. Skin: No spider angiomata. No jaundice. No palmar erythema. Respiratory: Lungs clear to auscultation. Cardiovascular: Regular heart rate. No murmurs. No JVD. Abdomen: Soft non-tender. Liver size normal to percussion/palpation. Spleen not palpable. No obvious ascites. Extremities: No edema. No muscle wasting.   No gross arthritic changes. Neurologic: Alert and oriented. Cranial nerves grossly intact. No asterixis. LABORATORY STUDIES:  Liver [place] of [address] & Units 12/15/2020 9/15/2020   WBC 3.4 - 10.8 x10E3/uL 4.3 3.6   ANC 1.4 - 7.0 x10E3/uL 2.5 1.9   HGB 13.0 - 17.7 g/dL 11.6 (L) 10.3 (L)    - 450 x10E3/uL 219 155   INR 0.8 - 1.2     AST 0 - 40 IU/L 22 30   ALT 0 - 44 IU/L 21 20   Alk Phos 39 - 117 IU/L 99 84   Bili, Total 0.0 - 1.2 mg/dL <0.2 0.3   Bili, Direct 0.00 - 0.40 mg/dL 0.06 0.10   Albumin 3.8 - 4.9 g/dL 4.2 3.8   BUN 6 - 24 mg/dL 51 (H) 36 (H)   Creat 0.76 - 1.27 mg/dL 5.85 (H) 4.51 (H)   Na 134 - 144 mmol/L 137 140   K 3.5 - 5.2 mmol/L 4.1 4.0   Cl 96 - 106 mmol/L 97 99   CO2 20 - 29 mmol/L 22 24   Glucose 65 - 99 mg/dL 148 (H) 200 (H)   Ammonia 11 - 32 UMOL/L       SEROLOGIES:  Serologies Latest Ref Rng & Units 7/6/2020   Hep A Ab, Total Negative Positive (A)   Hep B Surface Ag Negative Negative   Hep B Core Ab, Total Negative Negative   Hep B Surface AB QL  Reactive   Hep C Genotype  1a   HCV RT-PCR, Quant IU/mL 598,000   LIANA, IFA       LIVER HISTOLOGY:  TRANSIENT HEPATIC ELASTOGRAPHY:   E Range: 5.5-9.1 kPa  E Mean: 6.7 kPa  E Median: 6.2 kPa  E Std: 1.2 kPa    ENDOSCOPIC PROCEDURES:  Not available or performed    RADIOLOGY:  12/2019. Abdominal/pelvic CT wo contrast.  The liver, gallbladder, spleen, pancreas and adrenal glands are grossly normal.  07/2020. Ultrasound of the liver. Diffusely heterogeneous echotexture. No focal mass. OTHER TESTING:  Not available or performed    FOLLOW-UP:  All of the issues listed above in the Assessment and Plan were discussed with the patient. All questions were answered. The patient expressed a clear understanding of the above. [address] in 8 months for fibroscan.       TRISHA Schuster-HATTIE  Liver [place] [facility]  [address] [facility], [address] [facility]   [address], [address] [facility] [phone]

## 2021-04-21 LAB
ALBUMIN SERPL-MCNC: 4.2 G/DL (ref 3.8–4.9)
ALP SERPL-CCNC: 111 IU/L (ref 39–117)
ALT SERPL-CCNC: 9 IU/L (ref 0–44)
AST SERPL-CCNC: 17 IU/L (ref 0–40)
BASOPHILS # BLD AUTO: 0.1 X10E3/UL (ref 0–0.2)
BASOPHILS NFR BLD AUTO: 1 %
BILIRUB DIRECT SERPL-MCNC: 0.11 MG/DL (ref 0–0.4)
BILIRUB SERPL-MCNC: 0.3 MG/DL (ref 0–1.2)
BUN SERPL-MCNC: 30 MG/DL (ref 6–24)
BUN/CREAT SERPL: 6 (ref 9–20)
CALCIUM SERPL-MCNC: 9.4 MG/DL (ref 8.7–10.2)
CHLORIDE SERPL-SCNC: 98 MMOL/L (ref 96–106)
CO2 SERPL-SCNC: 23 MMOL/L (ref 20–29)
CREAT SERPL-MCNC: 5.22 MG/DL (ref 0.76–1.27)
EOSINOPHIL # BLD AUTO: 0.4 X10E3/UL (ref 0–0.4)
EOSINOPHIL NFR BLD AUTO: 10 %
ERYTHROCYTE [DISTWIDTH] IN BLOOD BY AUTOMATED COUNT: 15.1 % (ref 11.6–15.4)
GLUCOSE SERPL-MCNC: 91 MG/DL (ref 65–99)
HCT VFR BLD AUTO: 35.7 % (ref 37.5–51)
HCV RNA SERPL NAA+PROBE-ACNC: NORMAL IU/ML
HGB BLD-MCNC: 11.4 G/DL (ref 13–17.7)
IMM GRANULOCYTES # BLD AUTO: 0 X10E3/UL (ref 0–0.1)
IMM GRANULOCYTES NFR BLD AUTO: 0 %
LYMPHOCYTES # BLD AUTO: 1.4 X10E3/UL (ref 0.7–3.1)
LYMPHOCYTES NFR BLD AUTO: 32 %
MCH RBC QN AUTO: 23.4 PG (ref 26.6–33)
MCHC RBC AUTO-ENTMCNC: 31.9 G/DL (ref 31.5–35.7)
MCV RBC AUTO: 73 FL (ref 79–97)
MONOCYTES # BLD AUTO: 0.5 X10E3/UL (ref 0.1–0.9)
MONOCYTES NFR BLD AUTO: 12 %
NEUTROPHILS # BLD AUTO: 1.9 X10E3/UL (ref 1.4–7)
NEUTROPHILS NFR BLD AUTO: 45 %
PLATELET # BLD AUTO: 226 X10E3/UL (ref 150–450)
POTASSIUM SERPL-SCNC: 4.5 MMOL/L (ref 3.5–5.2)
PROT SERPL-MCNC: 7.9 G/DL (ref 6–8.5)
RBC # BLD AUTO: 4.87 X10E6/UL (ref 4.14–5.8)
SODIUM SERPL-SCNC: 139 MMOL/L (ref 134–144)
TEST INFORMATION: NORMAL
WBC # BLD AUTO: 4.3 X10E3/UL (ref 3.4–10.8)

## 2021-07-19 ENCOUNTER — APPOINTMENT (OUTPATIENT)
Dept: CT IMAGING | Age: 56
End: 2021-07-19
Attending: EMERGENCY MEDICINE
Payer: MEDICARE

## 2021-07-19 ENCOUNTER — HOSPITAL ENCOUNTER (EMERGENCY)
Age: 56
Discharge: HOME | End: 2021-07-19
Attending: EMERGENCY MEDICINE
Payer: MEDICARE

## 2021-07-19 VITALS
RESPIRATION RATE: 18 BRPM | WEIGHT: 155.8 LBS | TEMPERATURE: 97.5 F | HEIGHT: 65 IN | DIASTOLIC BLOOD PRESSURE: 69 MMHG | BODY MASS INDEX: 25.96 KG/M2 | OXYGEN SATURATION: 100 % | HEART RATE: 71 BPM | SYSTOLIC BLOOD PRESSURE: 139 MMHG

## 2021-07-19 DIAGNOSIS — T18.2XXA: Primary | ICD-10-CM

## 2021-07-19 DIAGNOSIS — R10.13 ABDOMINAL PAIN, EPIGASTRIC: ICD-10-CM

## 2021-07-19 LAB
ALBUMIN SERPL-MCNC: 3.8 G/DL (ref 3.4–5)
ALBUMIN/GLOB SERPL: 0.8 {RATIO} (ref 0.8–1.7)
ALP SERPL-CCNC: 117 U/L (ref 45–117)
ALT SERPL-CCNC: 15 U/L (ref 16–61)
ANION GAP SERPL CALC-SCNC: 12 MMOL/L (ref 3–18)
AST SERPL-CCNC: 22 U/L (ref 10–38)
BASOPHILS # BLD: 0 K/UL (ref 0–0.1)
BASOPHILS NFR BLD: 1 % (ref 0–2)
BILIRUB SERPL-MCNC: 0.5 MG/DL (ref 0.2–1)
BUN SERPL-MCNC: 72 MG/DL (ref 7–18)
BUN/CREAT SERPL: 8 (ref 12–20)
CALCIUM SERPL-MCNC: 8.9 MG/DL (ref 8.5–10.1)
CHLORIDE SERPL-SCNC: 100 MMOL/L (ref 100–111)
CO2 SERPL-SCNC: 24 MMOL/L (ref 21–32)
CREAT SERPL-MCNC: 9.14 MG/DL (ref 0.6–1.3)
DIFFERENTIAL METHOD BLD: ABNORMAL
EOSINOPHIL # BLD: 0.2 K/UL (ref 0–0.4)
EOSINOPHIL NFR BLD: 4 % (ref 0–5)
ERYTHROCYTE [DISTWIDTH] IN BLOOD BY AUTOMATED COUNT: 16.4 % (ref 11.6–14.5)
GLOBULIN SER CALC-MCNC: 5 G/DL (ref 2–4)
GLUCOSE SERPL-MCNC: 115 MG/DL (ref 74–99)
HCT VFR BLD AUTO: 41.9 % (ref 36–48)
HGB BLD-MCNC: 13 G/DL (ref 13–16)
LIPASE SERPL-CCNC: 167 U/L (ref 73–393)
LYMPHOCYTES # BLD: 1.1 K/UL (ref 0.9–3.6)
LYMPHOCYTES NFR BLD: 21 % (ref 21–52)
MAGNESIUM SERPL-MCNC: 2.8 MG/DL (ref 1.6–2.6)
MCH RBC QN AUTO: 22.5 PG (ref 24–34)
MCHC RBC AUTO-ENTMCNC: 31 G/DL (ref 31–37)
MCV RBC AUTO: 72.6 FL (ref 74–97)
MONOCYTES # BLD: 0.4 K/UL (ref 0.05–1.2)
MONOCYTES NFR BLD: 7 % (ref 3–10)
NEUTS SEG # BLD: 3.6 K/UL (ref 1.8–8)
NEUTS SEG NFR BLD: 67 % (ref 40–73)
PLATELET # BLD AUTO: 281 K/UL (ref 135–420)
PMV BLD AUTO: 11.5 FL (ref 9.2–11.8)
POTASSIUM SERPL-SCNC: 4.9 MMOL/L (ref 3.5–5.5)
PROT SERPL-MCNC: 8.8 G/DL (ref 6.4–8.2)
RBC # BLD AUTO: 5.77 M/UL (ref 4.35–5.65)
SODIUM SERPL-SCNC: 136 MMOL/L (ref 136–145)
TROPONIN I SERPL-MCNC: <0.02 NG/ML (ref 0–0.04)
WBC # BLD AUTO: 5.3 K/UL (ref 4.6–13.2)

## 2021-07-19 PROCEDURE — 96374 THER/PROPH/DIAG INJ IV PUSH: CPT

## 2021-07-19 PROCEDURE — 74011250636 HC RX REV CODE- 250/636: Performed by: PHYSICIAN ASSISTANT

## 2021-07-19 PROCEDURE — 80053 COMPREHEN METABOLIC PANEL: CPT

## 2021-07-19 PROCEDURE — 74176 CT ABD & PELVIS W/O CONTRAST: CPT

## 2021-07-19 PROCEDURE — 84484 ASSAY OF TROPONIN QUANT: CPT

## 2021-07-19 PROCEDURE — 85025 COMPLETE CBC W/AUTO DIFF WBC: CPT

## 2021-07-19 PROCEDURE — 96375 TX/PRO/DX INJ NEW DRUG ADDON: CPT

## 2021-07-19 PROCEDURE — 93005 ELECTROCARDIOGRAM TRACING: CPT

## 2021-07-19 PROCEDURE — 83690 ASSAY OF LIPASE: CPT

## 2021-07-19 PROCEDURE — 83735 ASSAY OF MAGNESIUM: CPT

## 2021-07-19 PROCEDURE — 99284 EMERGENCY DEPT VISIT MOD MDM: CPT

## 2021-07-19 RX ORDER — ONDANSETRON 2 MG/ML
4 INJECTION INTRAMUSCULAR; INTRAVENOUS
Status: COMPLETED | OUTPATIENT
Start: 2021-07-19 | End: 2021-07-19

## 2021-07-19 RX ORDER — MORPHINE SULFATE 4 MG/ML
4 INJECTION INTRAVENOUS
Status: COMPLETED | OUTPATIENT
Start: 2021-07-19 | End: 2021-07-19

## 2021-07-19 RX ADMIN — ONDANSETRON 4 MG: 2 INJECTION INTRAMUSCULAR; INTRAVENOUS at 09:42

## 2021-07-19 RX ADMIN — MORPHINE SULFATE 4 MG: 4 INJECTION INTRAVENOUS at 09:42

## 2021-07-19 NOTE — ED TRIAGE NOTES
Patient ambulatory into ER c/o abdominal pain that started last night. Patient denies any N/V/D. Patient states he has not taken his BP meds this morning.

## 2021-07-19 NOTE — ED PROVIDER NOTES
EMERGENCY DEPARTMENT HISTORY AND PHYSICAL EXAM    Date: 7/19/2021  Patient Name: Cristain Barnett    History of Presenting Illness     Chief Complaint   Patient presents with    Abdominal Pain         History Provided By: Patient and sister     Chief Complaint: abd pain and constipation   Duration: last night   Timing:  Acute   Location: epigastric  Quality: aching   Severity: moderate   Modifying Factors: none    Associated Symptoms: none       Additional History (Context): Cristian Barnett is a 63 y.o. male with a history of CAD, hypertension, CVA, CKD who presents today with his sister for issues listed above. Patient reports epigastric abd pain that began last night. Patient states at that time he thought he had to have a bowel movement but was not able to. Patient reports he was able to pass a small amount of stool this morning. Reports he is passing gas. Denies any nausea or vomiting. Denies any diarrhea. Denies any chest pain or shortness of breath. States that he is supposed to currently be at dialysis and typically receives this on Monday, Wednesday and Friday. Has not tried thing for this at home. Is not on daily stool softeners/MiraLAX. Reports a history of constipation. Denies a history of bowel obstructions. Denies any surgical history. PCP: Antonia Manrique MD    Current Outpatient Medications   Medication Sig Dispense Refill    rosuvastatin (CRESTOR) 10 mg tablet Take 10 mg by mouth daily.  isosorbide mononitrate ER (IMDUR) 30 mg tablet Take 1 Tab by mouth daily. 30 Tab 0    amLODIPine (Norvasc) 10 mg tablet Take 1 Tab by mouth daily. 30 Tab 0    carvedilol (COREG) 25 mg tablet Take 25 mg by mouth two (2) times daily (with meals).  aspirin (ASPIRIN) 325 mg tablet Take 81 mg by mouth daily.  Hydrochlorothiazide (HYDRODIURIL) 12.5 mg tablet Take 12.5 mg by mouth daily.  clopidogrel (PLAVIX) 75 mg tablet Take 1 Tab by mouth daily.  90 Tab 3    nitroglycerin (NITROSTAT) 0.4 mg SL tablet 1 Tab by SubLINGual route every five (5) minutes as needed for Chest Pain. 25 Tab 4    levothyroxine (SYNTHROID) 25 mcg tablet Take 25 mcg by mouth Daily (before breakfast). Past History     Past Medical History:  Past Medical History:   Diagnosis Date    CAD (coronary artery disease)     Essential hypertension     Hypercholesterolemia     Hyperlipidemia     Hypertension     Myocardial infarct ([address]     Peripheral vascular disease (HCC)     s/p left iliac stenting x 3    Psychiatric disorder     depression    Stroke [facility])        Past Surgical History:  Past Surgical History:   Procedure Laterality Date    HX CORONARY STENT PLACEMENT      2 STENTS 2012    HX HEART CATHETERIZATION      HX PTCA      MT CARDIAC SURG PROCEDURE UNLIST         Family History:  Family History   Problem Relation Age of Onset    Heart Attack Father        Social History:  Social History     Tobacco Use    Smoking status: Former Smoker    Smokeless tobacco: Never Used   Substance Use Topics    Alcohol use: Not Currently     Comment: hx of excessive use    Drug use: Not Currently     Comment: 8 yrs clean       Allergies:  No Known Allergies      Review of Systems   Review of Systems   Constitutional: Negative for chills and fever. HENT: Negative for congestion, rhinorrhea and sore throat. Respiratory: Negative for cough and shortness of breath. Cardiovascular: Negative for chest pain. Gastrointestinal: Positive for abdominal pain and constipation. Negative for anal bleeding, blood in stool, diarrhea, nausea and vomiting. Genitourinary: Negative for dysuria, frequency and hematuria. Musculoskeletal: Negative for back pain and myalgias. Skin: Negative for rash and wound. Neurological: Negative for dizziness and headaches. All other systems reviewed and are negative.     All Other Systems Negative  Physical Exam     Vitals:    07/19/21 8599 07/19/21 0648 07/19/21 2953 BP: 139/69     Pulse: 71     Resp: 18     Temp: 97.5 °F (36.4 °C)     SpO2: 100%  100%   Weight: 72.6 kg (160 lb) 70.7 kg (155 lb 12.8 oz)    Height: 5' 5\" (1.651 m)       Physical Exam  Vitals and nursing note reviewed. Constitutional:       General: He is not in acute distress. Appearance: He is well-developed. He is not diaphoretic. HENT:      Head: Normocephalic and atraumatic. Eyes:      Conjunctiva/sclera: Conjunctivae normal.   Cardiovascular:      Rate and Rhythm: Normal rate and regular rhythm. Heart sounds: Normal heart sounds. Pulmonary:      Effort: Pulmonary effort is normal. No respiratory distress. Breath sounds: Normal breath sounds. Chest:      Chest wall: No tenderness. Abdominal:      General: Bowel sounds are increased. There is no distension. Palpations: Abdomen is soft. There is no mass or pulsatile mass. Tenderness: There is generalized abdominal tenderness. There is no guarding or rebound. Comments: Bowel sounds slightly increased, no distention or mass. No guarding or peritoneal signs. Diffuse tenderness throughout. Musculoskeletal:         General: No deformity. Normal range of motion. Cervical back: Normal range of motion and neck supple. Skin:     General: Skin is warm and dry. Neurological:      Mental Status: He is alert and oriented to person, place, and time.            Diagnostic Study Results     Labs -     Recent Results (from the past 12 hour(s))   EKG, 12 LEAD, INITIAL    Collection Time: 07/19/21  9:14 AM   Result Value Ref Range    Ventricular Rate 70 BPM    Atrial Rate 70 BPM    P-R Interval 160 ms    QRS Duration 84 ms    Q-T Interval 414 ms    QTC Calculation (Bezet) 447 ms    Calculated P Axis 70 degrees    Calculated R Axis 66 degrees    Calculated T Axis 50 degrees    Diagnosis       Normal sinus rhythm  Possible Left atrial enlargement  Borderline ECG  When compared with ECG of 27-APR-2020 22:24,  T wave inversion no longer evident in Lateral leads     CBC WITH AUTOMATED DIFF    Collection Time: 07/19/21  9:15 AM   Result Value Ref Range    WBC 5.3 4.6 - 13.2 K/uL    RBC 5.77 (H) 4.35 - 5.65 M/uL    HGB 13.0 13.0 - 16.0 g/dL    HCT 41.9 36.0 - 48.0 %    MCV 72.6 (L) 74.0 - 97.0 FL    MCH 22.5 (L) 24.0 - 34.0 PG    MCHC 31.0 31.0 - 37.0 g/dL    RDW 16.4 (H) 11.6 - 14.5 %    PLATELET 337 853 - 171 K/uL    MPV 11.5 9.2 - 11.8 FL    NEUTROPHILS 67 40 - 73 %    LYMPHOCYTES 21 21 - 52 %    MONOCYTES 7 3 - 10 %    EOSINOPHILS 4 0 - 5 %    BASOPHILS 1 0 - 2 %    ABS. NEUTROPHILS 3.6 1.8 - 8.0 K/UL    ABS. LYMPHOCYTES 1.1 0.9 - 3.6 K/UL    ABS. MONOCYTES 0.4 0.05 - 1.2 K/UL    ABS. EOSINOPHILS 0.2 0.0 - 0.4 K/UL    ABS. BASOPHILS 0.0 0.0 - 0.1 K/UL    DF AUTOMATED     METABOLIC PANEL, COMPREHENSIVE    Collection Time: 07/19/21  9:15 AM   Result Value Ref Range    Sodium 136 136 - 145 mmol/L    Potassium 4.9 3.5 - 5.5 mmol/L    Chloride 100 100 - 111 mmol/L    CO2 24 21 - 32 mmol/L    Anion gap 12 3.0 - 18 mmol/L    Glucose 115 (H) 74 - 99 mg/dL    BUN 72 (H) 7.0 - 18 MG/DL    Creatinine 9.14 (H) 0.6 - 1.3 MG/DL    BUN/Creatinine ratio 8 (L) 12 - 20      GFR est AA 7 (L) >60 ml/min/1.73m2    GFR est non-AA 6 (L) >60 ml/min/1.73m2    Calcium 8.9 8.5 - 10.1 MG/DL    Bilirubin, total 0.5 0.2 - 1.0 MG/DL    ALT (SGPT) 15 (L) 16 - 61 U/L    AST (SGOT) 22 10 - 38 U/L    Alk. phosphatase 117 45 - 117 U/L    Protein, total 8.8 (H) 6.4 - 8.2 g/dL    Albumin 3.8 3.4 - 5.0 g/dL    Globulin 5.0 (H) 2.0 - 4.0 g/dL    A-G Ratio 0.8 0.8 - 1.7     LIPASE    Collection Time: 07/19/21  9:15 AM   Result Value Ref Range    Lipase 167 73 - 393 U/L   MAGNESIUM    Collection Time: 07/19/21  9:15 AM   Result Value Ref Range    Magnesium 2.8 (H) 1.6 - 2.6 mg/dL   TROPONIN I    Collection Time: 07/19/21  9:15 AM   Result Value Ref Range    Troponin-I, QT <0.02 0.0 - 0.045 NG/ML       Radiologic Studies -   CT ABD PELV WO CONT   Final Result      1.   Moderate gastric wall thickening and submucosal edema with mild perigastric   inflammatory changes present.      > Note is made of a linear radiodense structure within the stomach lumen   surrounded by the above-mentioned inflammatory changes and wall thickening. Correlation for history of swallowed radiodense object recommended, and further   evaluation with endoscopy may be beneficial.      > Additional radiodense structure within the distal mid abdominal small bowel   loop noted without accompanying abnormal bowel wall thickening/inflammation. Case and findings discussed with BEVERLEY Soares in the ED at 11:00 AM,   7/18/2021           CT Results  (Last 48 hours)               07/19/21 1044  CT ABD PELV WO CONT Final result    Impression:      1. Moderate gastric wall thickening and submucosal edema with mild perigastric   inflammatory changes present.      > Note is made of a linear radiodense structure within the stomach lumen   surrounded by the above-mentioned inflammatory changes and wall thickening. Correlation for history of swallowed radiodense object recommended, and further   evaluation with endoscopy may be beneficial.      > Additional radiodense structure within the distal mid abdominal small bowel   loop noted without accompanying abnormal bowel wall thickening/inflammation. Case and findings discussed with BEVERLEY Soares in the ED at 11:00 AM,   7/18/2021           Narrative:  EXAM: CT of the abdomen and pelvis       INDICATION: Mid epigastric abdominal pain       COMPARISON: None. TECHNIQUE: Axial CT imaging of the abdomen and pelvis was performed without   intravenous contrast. Multiplanar reformats were generated. One or more dose reduction techniques were used on this CT: automated exposure   control, adjustment of the mAs and/or kVp according to patient size, and   iterative reconstruction techniques.   The specific techniques used on this CT   exam have been documented in the patient's electronic medical record. Digital   Imaging and Communications in Medicine (DICOM) format image data are available   to nonaffiliated external healthcare facilities or entities on a secure, media   free, reciprocally searchable basis with patient authorization for at least a   12-month period after this study. _______________       FINDINGS:       LOWER CHEST: Minor basilar atelectasis. No alveolar consolidation or pleural   effusion. Cardiac size normal. Multivessel coronary arterial atherosclerotic   vascular calcifications. No pericardial effusion. LIVER, BILIARY: Liver is normal in unenhanced appearance. No biliary dilation. Gallbladder is unremarkable. PANCREAS: Normal unenhanced appearance. SPLEEN: Normal.       ADRENALS: Normal.       KIDNEYS/URETERS/BLADDER: No hydronephrosis involving either kidney. No   urolithiasis or suspicious appearing renal lesion. Urinary bladder decompressed. PELVIC ORGANS: Unremarkable. VASCULATURE: Extensive atherosclerosis. Prior bilateral common arterial stents   with additional left external iliac stent in situ. LYMPH NODES: No enlarged lymph nodes. GASTROINTESTINAL TRACT: There is moderate submucosal edema and mild perigastric   stranding in the region of the distal stomach body. Contained within the stomach   and surrounded by the above mentioned inflammatory changes, (best seen image   numbers [id number]) there is a linear radiodense object which extends across the   anterior to posterior aspect of the stomach measuring approximately 4.5 cm in   greatest included dimension and is estimated at approximately 2 mm in maximal   thickness. Mild perigastric stranding is noted. No evidence of free   intraperitoneal gas. No additional site of abnormal bowel wall thickening or   dilatation. Normal appendix.  Additional radiopaque structure within small bowel   loops noted within the midabdomen (image numbers [id number]) measuring approximately   5 mm in greatest dimension. BONES: No acute or aggressive osseous abnormalities identified. OTHER: None.       _______________               CXR Results  (Last 48 hours)    None            Medical Decision Making   I am the first provider for this patient. I reviewed the vital signs, available nursing notes, past medical history, past surgical history, family history and social history. Vital Signs-Reviewed the patient's vital signs. Records Reviewed: Nursing Notes and Old Medical Records     Procedures: None   Procedures    Provider Notes (Medical Decision Making):     Differential Diagnosis: constipation, gastroenteritis, IBS, IBD, celiac disease, diverticulitis, nephrolithiasis, pyelonephritis, cystitis, bowel obstruction, appendicitis       Plan: Will order labs, ua and CT of abd and pelvis. Will order zofran and morphine. 11:10 AM  Have discussed CT findings with patient and family. Patient reports he did eat but in poor chops last night it is possible that he may have swallowed a bone or capsule. Have discussed that there is already a piece in his small bowel and that these will most likely pass on their own. Did give strict return precautions should pain worsen and have advised patient to immediately return. Have stressed the importance of hydration. Have also discussed case with Dr. Dwaine Blount who agrees with the current plan. Patient's sister reports she will make sure his dialysis is rescheduled. Patient states he is happy with this plan and all questions have been answered. MED RECONCILIATION:  No current facility-administered medications for this encounter. Current Outpatient Medications   Medication Sig    rosuvastatin (CRESTOR) 10 mg tablet Take 10 mg by mouth daily.  isosorbide mononitrate ER (IMDUR) 30 mg tablet Take 1 Tab by mouth daily.  amLODIPine (Norvasc) 10 mg tablet Take 1 Tab by mouth daily.     carvedilol (COREG) 25 mg tablet Take 25 mg by mouth two (2) times daily (with meals).  aspirin (ASPIRIN) 325 mg tablet Take 81 mg by mouth daily.  Hydrochlorothiazide (HYDRODIURIL) 12.5 mg tablet Take 12.5 mg by mouth daily.  clopidogrel (PLAVIX) 75 mg tablet Take 1 Tab by mouth daily.  nitroglycerin (NITROSTAT) 0.4 mg SL tablet 1 Tab by SubLINGual route every five (5) minutes as needed for Chest Pain.  levothyroxine (SYNTHROID) 25 mcg tablet Take 25 mcg by mouth Daily (before breakfast). Disposition:  Home     DISCHARGE NOTE:   Pt has been reexamined. Patient has no new complaints, changes, or physical findings. Care plan outlined and precautions discussed. Results of workup were reviewed with the patient. All medications were reviewed with the patient. All of pt's questions and concerns were addressed. Patient was instructed and agrees to follow up with PCP as well as to return to the ED upon further deterioration. Patient is ready to go home. Follow-up Information     Follow up With Specialties Details Why [facility]    THE [facility] EMERGENCY DEPT Emergency Medicine  As needed [address]  [phone]    Rosalio Sanford MD Family Medicine Schedule an appointment as soon as possible for a visit   [address] [facility]  [phone]            Current Discharge Medication List              Diagnosis     Clinical Impression:   1. Stomach FB, initial encounter    2. Abdominal pain, epigastric          \"Please note that this dictation was completed with [employer], the computer voice recognition software. Quite often unanticipated grammatical, syntax, homophones, and other interpretive errors are inadvertently transcribed by the computer software. Please disregard these errors. Please excuse any errors that have escaped final proofreading. \"

## 2021-07-20 LAB
ATRIAL RATE: 70 BPM
CALCULATED P AXIS, ECG09: 70 DEGREES
CALCULATED R AXIS, ECG10: 66 DEGREES
CALCULATED T AXIS, ECG11: 50 DEGREES
DIAGNOSIS, 93000: NORMAL
P-R INTERVAL, ECG05: 160 MS
Q-T INTERVAL, ECG07: 414 MS
QRS DURATION, ECG06: 84 MS
QTC CALCULATION (BEZET), ECG08: 447 MS
VENTRICULAR RATE, ECG03: 70 BPM

## 2022-01-18 ENCOUNTER — HOSPITAL ENCOUNTER (OUTPATIENT)
Dept: LAB | Age: 57
Discharge: HOME | End: 2022-01-18

## 2022-01-18 ENCOUNTER — OFFICE VISIT (OUTPATIENT)
Dept: HEMATOLOGY | Age: 57
End: 2022-01-18
Payer: MEDICARE

## 2022-01-18 VITALS
BODY MASS INDEX: 25.99 KG/M2 | TEMPERATURE: 97.5 F | WEIGHT: 156 LBS | DIASTOLIC BLOOD PRESSURE: 75 MMHG | OXYGEN SATURATION: 96 % | HEART RATE: 69 BPM | SYSTOLIC BLOOD PRESSURE: 140 MMHG | HEIGHT: 65 IN

## 2022-01-18 DIAGNOSIS — B18.2 CHRONIC HEPATITIS C WITHOUT HEPATIC COMA (HCC): Primary | ICD-10-CM

## 2022-01-18 PROBLEM — Z86.19 HEPATITIS C VIRUS INFECTION CURED AFTER ANTIVIRAL DRUG THERAPY: Status: ACTIVE | Noted: 2022-01-18

## 2022-01-18 LAB — XX-LABCORP SPECIMEN COL,LCBCF: NORMAL

## 2022-01-18 PROCEDURE — 99214 OFFICE O/P EST MOD 30 MIN: CPT | Performed by: NURSE PRACTITIONER

## 2022-01-18 PROCEDURE — 99001 SPECIMEN HANDLING PT-LAB: CPT

## 2022-01-18 PROCEDURE — 91200 LIVER ELASTOGRAPHY: CPT | Performed by: NURSE PRACTITIONER

## 2022-01-18 NOTE — PROGRESS NOTES
[address] [facility]", MD, [address], [facility], [place]      DELLA Hernandez, ACNP-BC     April S Schuyler, [facility]   DANIEL BronsonP-HATTIE Dunlap, [facility]       Shiv Herrera [facility] [address]    at [facility]    [address] [facility], [address] [facility], [facility] 22.    [phone]    FAX: [address] [place] [address]    at [address], [address], [address]    [phone]    FAX: [phone]       Patient Care Team:  Latesha Zamarripa MD as PCP - General (Family Medicine)  Sandeep Blackwood MD (Nephrology)  Cheri Clifton MD (Cardio Vascular Surgery)      Problem List  Date Reviewed: 1/18/2022          Codes Class Noted    Hepatitis C virus infection cured after antiviral drug therapy ICD-10-CM: Z86.19  ICD-9-CM: V12.09  1/18/2022        Hypoalbuminemia ICD-10-CM: E88.09  ICD-9-CM: 273.8  4/29/2020        Acute on chronic renal failure ([facility] [address] ICD-10-CM: N17.9, N18.9  ICD-9-CM: 584.9, 585.9  4/28/2020        Hypertension ICD-10-CM: I10  ICD-9-CM: 401.9  3/24/2015        Chest pain, unspecified ICD-10-CM: R07.9  ICD-9-CM: 786.50  10/15/2013        High cholesterol ICD-10-CM: E78.00  ICD-9-CM: 272.0  10/15/2013        H/O: CVA (cerebrovascular accident) ICD-10-CM: Z86.73  ICD-9-CM: V12.54  10/15/2013        H/O acute myocardial infarction ICD-10-CM: I25.2  ICD-9-CM: 412  10/15/2013        S/P angioplasty with stent ICD-10-CM: Z95.820  ICD-9-CM: V45.89  10/15/2013        S/P insertion of iliac artery stent ICD-10-CM: Z95.828  ICD-9-CM: V45.89  10/15/2013    Overview Signed 10/15/2013  6:50 AM by Bryon Pelayo     left             CAD (coronary artery disease), native coronary artery ICD-10-CM: I25.10  ICD-9-CM: 414.01  5/12/2012        PVD (peripheral vascular disease) with claudication [facility]) ICD-10-CM: I73.9  ICD-9-CM: 443.9  5/12/2012              Svetlana Marr returns to the [facility] today for fibroscan assessment of hepatic fibrosis and for education and management of chronic v. acute HCV. The HCV has been treated and cured. He began antiviral therapy on 10/13/2020. He was treated with 12 weeks of Epclusa. He completed the antiviral regime on about 01/04/2021. This is the only time the HCV has ever been treated. The active problem list, all pertinent past medical history, medications, liver histology, endoscopic studies, radiologic findings and laboratory findings related to the liver disorder were reviewed with the patient. The patient is a 61 y.o. Black male who was found to have abnormalities in liver chemistries and subsequently tested positive for chronic HCV who was screened for anti-HCV and tested positive after being incarcerated in early late 1980's. Risk factors for acquiring HCV are IV drug use in late 1980's. There was no history of acute icteric hepatitis at the time of these risk factors. CT scan of the liver was performed in 12/2019. The results of the imaging demonstrated a normal appearing liver. An assessment of liver fibrosis with shear wave elastography was performed in 07/2020. Results indicate a Metavir fibrosis score of F1, normal to mild hepatic fibrosis. Ultrasound was also performed in 07/2020. No hepatic masses. The patient has no symptoms which can be attributed to the liver disorder. The patient completes all daily activities without any functional limitations.  The patient has not experienced fatigue, fevers, chills, shortness of breath, chest pain, pain in the right side over the liver, diffuse abdominal pain, nausea, vomiting, constipation, diarrhrea, dry eyes, dry mouth, arthralgias, myalgias, yellowing of the eyes or skin, itching, dark urine, problems concentrating, swelling of the abdomen, swelling of the lower extremities, hematemesis, or hematochezia. Since the last office appointment the patient:  Had no changes in the liver disease. Completed 12 weeks of Epclusa around 01/04/2021. ASSESSMENT AND PLAN:  Chronic HCV   Chronic HCV with normal to mild hepatic fibrosis. The most recent laboratory studies indicate that the liver transaminases are normal, alkaline phosphatase is normal, tests of hepatic synthetic and metabolic function are normal, and the platelet count is normal.      Based upon laboratory studies, imaging, and shear wave elastography, the patient does not have significant liver injury. Will perform laboratory testing to monitor liver function and degree of liver injury. Chronic HCV Treatment  The patient had genotype 1A. He began antiviral therapy around 10/13/2020. He was treated with Epclusa. This is the only time the HCV has ever been treated. He denies missing any doses of the Epclusa and he has no treatment related complaints. There was no detectable HCV RNA on today's labs. HCV eradicated. Fibroscan  The need to perform an assessment of liver fibrosis was discussed with the patient. The Fibroscan can assess liver fibrosis and determine if a patient has advanced fibrosis or cirrhosis without the need for liver biopsy. This was performed during today's appointment. Results of the scan indicate cirrhosis, however this is not consistent with labs or imaging. We will repeat the fibroscan in 6 months. He will be certain to have a completely empty belly for that test.   I am not entering cirrhosis in the patient problem list at this time. The Fibroscan can be repeated annually or as often as clinically indicated to assess for fibrosis progression and/or regression. Screening for Hepatocellular Carcinoma  HCC screening has recently been performed and does not suggest [address] Both an AFP-L3% and an ultrasound were unremarkable. Treatment of other medical problems in patients with chronic liver disease  There are no contraindications for the patient to take most medications that are necessary for treatment of other medical issues. The patient does not comsume alcohol on a daily basis. Quit drinking sometime around May, 2020. Normal doses of acetaminophen, as recommended on the label of the bottle, are not hepatotoxic except in the setting of daily alcohol use, even in patients with cirrhosis and can be utilized for pain. Counseling for alcohol in patients with chronic liver disease  The patient was counseled regarding alcohol consumption and the effect of alcohol on chronic liver disease. The patient has cirrhosis and was advised to be abstinent from all alcohol including non-alcoholic beer which does contain some alcohol. Substance Use  The patient has not used drugs since late 1980's. Vaccinations   Vaccination for viral hepatitis A and B is not required. The patient has serologic evidence of prior exposure or vaccination with immunity. Routine vaccinations against other bacterial and viral agents can be performed as indicated. Annual flu vaccination should be administered if indicated. ALLERGIES  No Known Allergies    MEDICATIONS  Current Outpatient Medications   Medication Sig    rosuvastatin (CRESTOR) 10 mg tablet Take 10 mg by mouth daily.  isosorbide mononitrate ER (IMDUR) 30 mg tablet Take 1 Tab by mouth daily.  amLODIPine (Norvasc) 10 mg tablet Take 1 Tab by mouth daily.  carvedilol (COREG) 25 mg tablet Take 25 mg by mouth two (2) times daily (with meals).  aspirin (ASPIRIN) 325 mg tablet Take 81 mg by mouth daily.  Hydrochlorothiazide (HYDRODIURIL) 12.5 mg tablet Take 12.5 mg by mouth daily.  clopidogrel (PLAVIX) 75 mg tablet Take 1 Tab by mouth daily.  nitroglycerin (NITROSTAT) 0.4 mg SL tablet 1 Tab by SubLINGual route every five (5) minutes as needed for Chest Pain.     levothyroxine (SYNTHROID) 25 mcg tablet Take 25 mcg by mouth Daily (before breakfast). No current facility-administered medications for this visit. SYSTEM REVIEW NOT RELATED TO LIVER DISEASE OR REVIEWED ABOVE:  Constitution systems: Negative for fever, chills, weight gain, weight loss. Eyes: Negative for visual changes. ENT: Negative for sore throat, painful swallowing. Respiratory: Negative for cough, hemoptysis, SOB. Cardiology: Negative for chest pain, palpitations. GI:  Negative for constipation or diarrhea. : Negative for urinary frequency, dysuria, hematuria, nocturia. Skin: Negative for rash. Hematology: Negative for easy bruising, blood clots. Musculo-skelatal: Negative for back pain, muscle pain, weakness. Neurologic: Negative for headaches, dizziness, vertigo, memory problems not related to HE. Psychology: Negative for anxiety, depression. FAMILY HISTORY:  The father  of heart failure at age 67. The mother  of CVA in 2006 at age 61. There is no family history of liver disease. There is no family history of immune disorders. SOCIAL HISTORY:  The patient has never been . The patient has 1 child and 4 grandchildren. The patient stopped using tobacco products in around May, 2020. The patient has been abstinent from alcohol since May, 2020. The patient does not work. The patient is currently receiving disability. PHYSICAL EXAMINATION:  Visit Vitals  BP (!) 140/75 (BP 1 Location: Right arm, BP Patient Position: Sitting, BP Cuff Size: Small adult)   Pulse 69   Temp 97.5 °F (36.4 °C)   Ht 5' 5\" (1.651 m)   Wt 156 lb (70.8 kg)   SpO2 96%   BMI 25.96 kg/m²     General: No acute distress. Eyes: Sclera anicteric. ENT: No oral lesions. Thyroid normal.  Nodes: No adenopathy. Skin: No spider angiomata. No jaundice. No palmar erythema. Respiratory: Lungs clear to auscultation. Cardiovascular: Regular heart rate. No murmurs.   No JVD.  Abdomen: Soft non-tender. Liver size normal to percussion/palpation. Spleen not palpable. No obvious ascites. Extremities: No edema. No muscle wasting. No gross arthritic changes. Neurologic: Alert and oriented. Cranial nerves grossly intact. No asterixis. LABORATORY STUDIES:  [facility] [place] of [address] & Units 1/18/2022 7/19/2021   WBC 3.4 - 10.8 x10E3/uL 4.4 5.3   ANC 1.4 - 7.0 x10E3/uL 2.8 3.6   HGB 13.0 - 17.7 g/dL 8.7 (L) 13.0    - 450 x10E3/uL 193 281   INR 0.9 - 1.2 1.2    AST 0 - 40 IU/L 13 22   ALT 0 - 44 IU/L 9 15 (L)   Alk Phos 44 - 121 IU/L 113 117   Bili, Total 0.0 - 1.2 mg/dL 0.5 0.5   Bili, Direct 0.00 - 0.40 mg/dL 0.23    Albumin 3.8 - 4.9 g/dL 4.0 3.8   BUN 6 - 24 mg/dL 35 (H) 72 (H)   Creat 0.76 - 1.27 mg/dL 7.19 (H) 9.14 (H)   Na 134 - 144 mmol/L 142 136   K 3.5 - 5.2 mmol/L 5.2 4.9   Cl 96 - 106 mmol/L 102 100   CO2 20 - 29 mmol/L 25 24   Glucose 65 - 99 mg/dL 83 115 (H)   Magnesium 1.6 - 2.6 mg/dL  2.8 (H)   Ammonia 11 - 32 UMOL/L       SEROLOGIES:  Serologies Latest Ref Rng & Units 7/6/2020   Hep A Ab, Total Negative Positive (A)   Hep B Surface Ag Negative Negative   Hep B Core Ab, Total Negative Negative   Hep B Surface AB QL  Reactive   Hep C Genotype  1a   HCV RT-PCR, Quant IU/mL 598,000   LIANA, IFA       LIVER HISTOLOGY:  TRANSIENT HEPATIC ELASTOGRAPHY:   E Range: 5.5-9.1 kPa  E Mean: 6.7 kPa  E Median: 6.2 kPa  E Std: 1.2 kPa    01/2022. [facility]. EkPa was 16.2. IQR/med 15%. . The results suggested a fibrosis level of F4. The CAP score suggests there is no significant hepatic steatosis. ENDOSCOPIC PROCEDURES:  Not available or performed    RADIOLOGY:  12/2019. Abdominal/pelvic CT wo contrast.  The liver, gallbladder, spleen, pancreas and adrenal glands are grossly normal.  07/2020. Ultrasound of the liver. Diffusely heterogeneous echotexture. No focal mass.     OTHER TESTING:  Not available or performed    FOLLOW-UP:  All of the issues listed above in the Assessment and Plan were discussed with the patient. All questions were answered. The patient expressed a clear understanding of the above. [address] [address] in 6 months for fibroscan.       DANIEL RendonP-C  [facility] [place] [facility] [employer]  [address] [place] [address], [address] [place] [address], [address] [place] [address]   [phone]

## 2022-01-20 LAB
AFP L3 MFR SERPL: NORMAL % (ref 0–9.9)
AFP SERPL-MCNC: 1.3 NG/ML (ref 0–8)
ALBUMIN SERPL-MCNC: 4 G/DL (ref 3.8–4.9)
ALP SERPL-CCNC: 113 IU/L (ref 44–121)
ALT SERPL-CCNC: 9 IU/L (ref 0–44)
AST SERPL-CCNC: 13 IU/L (ref 0–40)
BASOPHILS # BLD AUTO: 0.1 X10E3/UL (ref 0–0.2)
BASOPHILS NFR BLD AUTO: 1 %
BILIRUB DIRECT SERPL-MCNC: 0.23 MG/DL (ref 0–0.4)
BILIRUB SERPL-MCNC: 0.5 MG/DL (ref 0–1.2)
BUN SERPL-MCNC: 35 MG/DL (ref 6–24)
BUN/CREAT SERPL: 5 (ref 9–20)
CALCIUM SERPL-MCNC: 9 MG/DL (ref 8.7–10.2)
CHLORIDE SERPL-SCNC: 102 MMOL/L (ref 96–106)
CO2 SERPL-SCNC: 25 MMOL/L (ref 20–29)
CREAT SERPL-MCNC: 7.19 MG/DL (ref 0.76–1.27)
EOSINOPHIL # BLD AUTO: 0.3 X10E3/UL (ref 0–0.4)
EOSINOPHIL NFR BLD AUTO: 8 %
ERYTHROCYTE [DISTWIDTH] IN BLOOD BY AUTOMATED COUNT: 16.7 % (ref 11.6–15.4)
GLUCOSE SERPL-MCNC: 83 MG/DL (ref 65–99)
HCT VFR BLD AUTO: 27 % (ref 37.5–51)
HCV RNA SERPL NAA+PROBE-ACNC: NORMAL IU/ML
HGB BLD-MCNC: 8.7 G/DL (ref 13–17.7)
IMM GRANULOCYTES # BLD AUTO: 0 X10E3/UL (ref 0–0.1)
IMM GRANULOCYTES NFR BLD AUTO: 0 %
INR PPP: 1.2 (ref 0.9–1.2)
LYMPHOCYTES # BLD AUTO: 0.8 X10E3/UL (ref 0.7–3.1)
LYMPHOCYTES NFR BLD AUTO: 18 %
MCH RBC QN AUTO: 25.7 PG (ref 26.6–33)
MCHC RBC AUTO-ENTMCNC: 32.2 G/DL (ref 31.5–35.7)
MCV RBC AUTO: 80 FL (ref 79–97)
MONOCYTES # BLD AUTO: 0.4 X10E3/UL (ref 0.1–0.9)
MONOCYTES NFR BLD AUTO: 10 %
NEUTROPHILS # BLD AUTO: 2.8 X10E3/UL (ref 1.4–7)
NEUTROPHILS NFR BLD AUTO: 63 %
PLATELET # BLD AUTO: 193 X10E3/UL (ref 150–450)
POTASSIUM SERPL-SCNC: 5.2 MMOL/L (ref 3.5–5.2)
PROT SERPL-MCNC: 7.2 G/DL (ref 6–8.5)
PROTHROMBIN TIME: 12.5 SEC (ref 9.1–12)
RBC # BLD AUTO: 3.38 X10E6/UL (ref 4.14–5.8)
SODIUM SERPL-SCNC: 142 MMOL/L (ref 134–144)
TEST INFORMATION: NORMAL
WBC # BLD AUTO: 4.4 X10E3/UL (ref 3.4–10.8)

## 2022-03-18 PROBLEM — Z86.19 HEPATITIS C VIRUS INFECTION CURED AFTER ANTIVIRAL DRUG THERAPY: Status: ACTIVE | Noted: 2022-01-18

## 2022-03-19 PROBLEM — N17.9 ACUTE ON CHRONIC RENAL FAILURE (HCC): Status: ACTIVE | Noted: 2020-04-28

## 2022-03-19 PROBLEM — N18.9 ACUTE ON CHRONIC RENAL FAILURE (HCC): Status: ACTIVE | Noted: 2020-04-28

## 2022-03-19 PROBLEM — E88.09 HYPOALBUMINEMIA: Status: ACTIVE | Noted: 2020-04-29

## 2023-01-17 ENCOUNTER — OFFICE VISIT (OUTPATIENT)
Dept: HEMATOLOGY | Age: 58
End: 2023-01-17
Payer: MEDICARE

## 2023-01-17 ENCOUNTER — HOSPITAL ENCOUNTER (OUTPATIENT)
Dept: LAB | Age: 58
Discharge: HOME | End: 2023-01-17

## 2023-01-17 VITALS
HEART RATE: 69 BPM | HEIGHT: 65 IN | BODY MASS INDEX: 25.66 KG/M2 | SYSTOLIC BLOOD PRESSURE: 144 MMHG | OXYGEN SATURATION: 94 % | DIASTOLIC BLOOD PRESSURE: 80 MMHG | WEIGHT: 154 LBS | TEMPERATURE: 97.5 F

## 2023-01-17 DIAGNOSIS — B18.2 CHRONIC HEPATITIS C WITHOUT HEPATIC COMA (HCC): ICD-10-CM

## 2023-01-17 DIAGNOSIS — B18.2 CHRONIC HEPATITIS C WITHOUT HEPATIC COMA (HCC): Primary | ICD-10-CM

## 2023-01-17 PROBLEM — K74.60 CIRRHOSIS (HCC): Status: ACTIVE | Noted: 2023-01-17

## 2023-01-17 LAB
ALBUMIN SERPL-MCNC: 3.1 G/DL (ref 3.4–5)
ALBUMIN/GLOB SERPL: 0.7 (ref 0.8–1.7)
ALP SERPL-CCNC: 96 U/L (ref 45–117)
ALT SERPL-CCNC: 17 U/L (ref 16–61)
ANION GAP SERPL CALC-SCNC: 7 MMOL/L (ref 3–18)
AST SERPL-CCNC: 17 U/L (ref 10–38)
BASOPHILS # BLD: 0 K/UL (ref 0–0.1)
BASOPHILS NFR BLD: 1 % (ref 0–2)
BILIRUB DIRECT SERPL-MCNC: 0.3 MG/DL (ref 0–0.2)
BILIRUB SERPL-MCNC: 0.8 MG/DL (ref 0.2–1)
BUN SERPL-MCNC: 46 MG/DL (ref 7–18)
BUN/CREAT SERPL: 7 (ref 12–20)
CALCIUM SERPL-MCNC: 8.9 MG/DL (ref 8.5–10.1)
CHLORIDE SERPL-SCNC: 102 MMOL/L (ref 100–111)
CO2 SERPL-SCNC: 30 MMOL/L (ref 21–32)
CREAT SERPL-MCNC: 6.92 MG/DL (ref 0.6–1.3)
DIFFERENTIAL METHOD BLD: ABNORMAL
EOSINOPHIL # BLD: 0.2 K/UL (ref 0–0.4)
EOSINOPHIL NFR BLD: 4 % (ref 0–5)
ERYTHROCYTE [DISTWIDTH] IN BLOOD BY AUTOMATED COUNT: 16.8 % (ref 11.6–14.5)
GLOBULIN SER CALC-MCNC: 4.4 G/DL (ref 2–4)
GLUCOSE SERPL-MCNC: 78 MG/DL (ref 74–99)
HCT VFR BLD AUTO: 30.9 % (ref 36–48)
HGB BLD-MCNC: 9.7 G/DL (ref 13–16)
IMM GRANULOCYTES # BLD AUTO: 0 K/UL (ref 0–0.04)
IMM GRANULOCYTES NFR BLD AUTO: 1 % (ref 0–0.5)
INR PPP: 1.4 (ref 0.8–1.2)
LYMPHOCYTES # BLD: 0.5 K/UL (ref 0.9–3.6)
LYMPHOCYTES NFR BLD: 9 % (ref 21–52)
MCH RBC QN AUTO: 24.2 PG (ref 24–34)
MCHC RBC AUTO-ENTMCNC: 31.4 G/DL (ref 31–37)
MCV RBC AUTO: 77.1 FL (ref 78–100)
MONOCYTES # BLD: 0.7 K/UL (ref 0.05–1.2)
MONOCYTES NFR BLD: 11 % (ref 3–10)
NEUTS SEG # BLD: 4.5 K/UL (ref 1.8–8)
NEUTS SEG NFR BLD: 75 % (ref 40–73)
NRBC # BLD: 0 K/UL (ref 0–0.01)
NRBC BLD-RTO: 0 PER 100 WBC
PLATELET # BLD AUTO: 211 K/UL (ref 135–420)
PMV BLD AUTO: 9.8 FL (ref 9.2–11.8)
POTASSIUM SERPL-SCNC: 4.2 MMOL/L (ref 3.5–5.5)
PROT SERPL-MCNC: 7.5 G/DL (ref 6.4–8.2)
PROTHROMBIN TIME: 17.7 SEC (ref 11.5–15.2)
RBC # BLD AUTO: 4.01 M/UL (ref 4.35–5.65)
SODIUM SERPL-SCNC: 139 MMOL/L (ref 136–145)
WBC # BLD AUTO: 6 K/UL (ref 4.6–13.2)

## 2023-01-17 PROCEDURE — G8432 DEP SCR NOT DOC, RNG: HCPCS | Performed by: NURSE PRACTITIONER

## 2023-01-17 PROCEDURE — 85025 COMPLETE CBC W/AUTO DIFF WBC: CPT

## 2023-01-17 PROCEDURE — 3077F SYST BP >= 140 MM HG: CPT | Performed by: NURSE PRACTITIONER

## 2023-01-17 PROCEDURE — 82107 ALPHA-FETOPROTEIN L3: CPT

## 2023-01-17 PROCEDURE — 99214 OFFICE O/P EST MOD 30 MIN: CPT | Performed by: NURSE PRACTITIONER

## 2023-01-17 PROCEDURE — 80048 BASIC METABOLIC PNL TOTAL CA: CPT

## 2023-01-17 PROCEDURE — G8419 CALC BMI OUT NRM PARAM NOF/U: HCPCS | Performed by: NURSE PRACTITIONER

## 2023-01-17 PROCEDURE — G8427 DOCREV CUR MEDS BY ELIG CLIN: HCPCS | Performed by: NURSE PRACTITIONER

## 2023-01-17 PROCEDURE — 3017F COLORECTAL CA SCREEN DOC REV: CPT | Performed by: NURSE PRACTITIONER

## 2023-01-17 PROCEDURE — 3079F DIAST BP 80-89 MM HG: CPT | Performed by: NURSE PRACTITIONER

## 2023-01-17 PROCEDURE — 85610 PROTHROMBIN TIME: CPT

## 2023-01-17 PROCEDURE — 80076 HEPATIC FUNCTION PANEL: CPT

## 2023-01-17 PROCEDURE — 91200 LIVER ELASTOGRAPHY: CPT | Performed by: NURSE PRACTITIONER

## 2023-01-17 PROCEDURE — G0463 HOSPITAL OUTPT CLINIC VISIT: HCPCS | Performed by: NURSE PRACTITIONER

## 2023-01-17 PROCEDURE — 36415 COLL VENOUS BLD VENIPUNCTURE: CPT

## 2023-01-17 RX ORDER — GUAIFENESIN 100 MG/5ML
81 LIQUID (ML) ORAL DAILY
COMMUNITY

## 2023-01-17 RX ORDER — FLUTICASONE FUROATE, UMECLIDINIUM BROMIDE AND VILANTEROL TRIFENATATE 200; 62.5; 25 UG/1; UG/1; UG/1
1 POWDER RESPIRATORY (INHALATION) DAILY
COMMUNITY

## 2023-01-17 RX ORDER — PREDNISONE 20 MG/1
TABLET ORAL
COMMUNITY

## 2023-01-17 RX ORDER — GLUCOSAMINE SULFATE 1500 MG
POWDER IN PACKET (EA) ORAL DAILY
COMMUNITY

## 2023-01-17 RX ORDER — ALBUTEROL SULFATE 90 UG/1
AEROSOL, METERED RESPIRATORY (INHALATION)
COMMUNITY
Start: 2022-12-17

## 2023-01-17 RX ORDER — IPRATROPIUM BROMIDE AND ALBUTEROL SULFATE 2.5; .5 MG/3ML; MG/3ML
3 SOLUTION RESPIRATORY (INHALATION) 2 TIMES DAILY
COMMUNITY
Start: 2022-12-17 | End: 2023-12-17

## 2023-01-17 NOTE — PROGRESS NOTES
[address] [facility], MD, [address], [place], [place]      DELLA Hill, [facility]     April S Schuyler, [facility]   TRISHA Duque-HATTIE Bowman, [facility]       Shiv Herrera [address]is [address]    at [address], [address] [place] [address] Rd, [address]    [phone]    FAX: [address] [place] [address] Avenue    at [address], [address]    [phone]    FAX: [phone]       Patient Care Team:  Scotty Monreal MD as PCP - General (Family Medicine)  Carlos Enrique Marmolejo MD (Nephrology)  Anthony Walton MD (Cardio Vascular Surgery)      Problem List  Date Reviewed: 1/17/2023            Codes Class Noted    Hepatitis C virus infection cured after antiviral drug therapy ICD-10-CM: Z86.19  ICD-9-CM: V12.09  1/18/2022        Hypoalbuminemia ICD-10-CM: E88.09  ICD-9-CM: 273.8  4/29/2020        Acute on chronic renal failure ([facility] [address] ICD-10-CM: N17.9, N18.9  ICD-9-CM: 584.9, 585.9  4/28/2020        Hypertension ICD-10-CM: I10  ICD-9-CM: 401.9  3/24/2015        Chest pain, unspecified ICD-10-CM: R07.9  ICD-9-CM: 786.50  10/15/2013        High cholesterol ICD-10-CM: E78.00  ICD-9-CM: 272.0  10/15/2013        H/O: CVA (cerebrovascular accident) ICD-10-CM: Z86.73  ICD-9-CM: V12.54  10/15/2013        H/O acute myocardial infarction ICD-10-CM: I25.2  ICD-9-CM: 412  10/15/2013        S/P angioplasty with stent ICD-10-CM: Z95.820  ICD-9-CM: V45.89  10/15/2013        S/P insertion of iliac artery stent ICD-10-CM: Z95.828  ICD-9-CM: V45.89  10/15/2013    Overview Signed 10/15/2013  6:50 AM by Jesus Alberto Laurent     left             CAD (coronary artery disease), native coronary artery ICD-10-CM: I25.10  ICD-9-CM: 414.01  5/12/2012        PVD (peripheral vascular disease) with claudication [facility]) ICD-10-CM: I73.9  ICD-9-CM: 443.9  5/12/2012           Celso Rider returns to the [facility] today for fibroscan assessment of hepatic fibrosis and for education and management of chronic v. acute HCV. The HCV has been treated and cured. He began antiviral therapy on 10/13/2020 for the hepatitis C. He was treated with 12 weeks of Epclusa. He completed the antiviral regime on about 01/04/2021. The HCV has been eradicated and the patient is cured of the infection. This is the only time the HCV has ever been treated. The active problem list, all pertinent past medical history, medications, liver histology, endoscopic studies, radiologic findings and laboratory findings related to the liver disorder were reviewed with the patient. The patient is a 61 y.o. Black male who was found to have abnormalities in liver chemistries and subsequently tested positive for chronic HCV who was screened for anti-HCV and tested positive after being incarcerated in early late 1980's. Risk factors for acquiring HCV are IV drug use in late 1980's. There was no history of acute icteric hepatitis at the time of these risk factors. CT scan of the liver was performed in 12/2019. The results of the imaging demonstrated a normal appearing liver. An assessment of liver fibrosis with shear wave elastography was performed in 07/2020. Results indicate a Metavir fibrosis score of F1, normal to mild hepatic fibrosis. Ultrasound was also performed in 07/2020. No hepatic masses. The patient undergoes hemodialysis Tues. , Thurs. , and Saturdays. The patient is experiencing abdominal swelling. This is unlikely related to liver disease. The patient completes all daily activities without any functional limitations.  The patient has not experienced fatigue, fevers, chills, shortness of breath, chest pain, pain in the right side over the liver, diffuse abdominal pain, nausea, vomiting, constipation, diarrhrea, dry eyes, dry mouth, arthralgias, myalgias, yellowing of the eyes or skin, itching, dark urine, problems concentrating, swelling of the abdomen, swelling of the lower extremities, hematemesis, or hematochezia. Since the last office appointment the patient:  Had no changes in the liver disease. ASSESSMENT AND PLAN:  Chronic HCV   Chronic HCV with normal to mild hepatic fibrosis. The most recent laboratory studies indicate that the liver transaminases are normal, alkaline phosphatase is normal, tests of hepatic synthetic and metabolic function are normal, and the platelet count is normal.      Will perform laboratory testing to monitor liver function and degree of liver injury. Chronic HCV Treatment  The patient had genotype 1A. He began antiviral therapy around 10/13/2020. He was treated with Epclusa. This is the only time the HCV has ever been treated. He denies missing any doses of the Epclusa and he had no treatment related complaints. HCV eradicated. Fibroscan  The need to perform an assessment of liver fibrosis was discussed with the patient. The Fibroscan can assess liver fibrosis and determine if a patient has advanced fibrosis or cirrhosis without the need for liver biopsy. This was performed during today's appointment. Results of the scan indicate cirrhosis, however this is not consistent with labs or imaging. The Fibroscan can be repeated annually or as often as clinically indicated to assess for fibrosis progression and/or regression. Based upon laboratory studies, imaging, and shear wave elastography, the patient does not have significant liver injury. However, the last two fibroscans indicate that the patient has cirrhosis. I do not believe that the patient has cirrhosis. This is based on labs and imaging.   It is my belief that this patient has developed ascites due to passive hepatic congestion, likely from tricuspid valve regurgitation. Will order a cardiac echo to evaluate. Based on what the ECHO reveals, we can perform hepatic venous pressure gradient measurements. I have added cirrhosis to the patient's problem list.      Screening for Hepatocellular Carcinoma  [address] screening has recently been performed and does not suggest [address] Both an AFP-L3% and an ultrasound were unremarkable. Treatment of other medical problems in patients with chronic liver disease  There are no contraindications for the patient to take most medications that are necessary for treatment of other medical issues. The patient does not comsume alcohol on a daily basis. Quit drinking sometime around May, 2020. Normal doses of acetaminophen, as recommended on the label of the bottle, are not hepatotoxic except in the setting of daily alcohol use, even in patients with cirrhosis and can be utilized for pain. Counseling for alcohol in patients with chronic liver disease  The patient was counseled regarding alcohol consumption and the effect of alcohol on chronic liver disease. The patient has cirrhosis and was advised to be abstinent from all alcohol including non-alcoholic beer which does contain some alcohol. Substance Use  The patient has not used drugs since late 1980's. Vaccinations   Vaccination for viral hepatitis A and B is not required. The patient has serologic evidence of prior exposure or vaccination with immunity. Routine vaccinations against other bacterial and viral agents can be performed as indicated. Annual flu vaccination should be administered if indicated. ALLERGIES  No Known Allergies    MEDICATIONS  Current Outpatient Medications   Medication Sig    predniSONE (DELTASONE) 20 mg tablet Take  by mouth daily (with breakfast).     albuterol (PROVENTIL HFA, VENTOLIN HFA, PROAIR HFA) 90 mcg/actuation inhaler INHALE 2 PUFFS BY MOUTH EVERY 6 HOURS AS NEEDED FOR WHEEZING    cholecalciferol (Vitamin D3) 25 mcg (1,000 unit) cap Take  by mouth daily. fluticasone-umeclidin-vilanter (Trelegy Ellipta) 200-62.5-25 mcg inhaler Take 1 Puff by inhalation daily. aspirin 81 mg chewable tablet Take 81 mg by mouth daily. albuterol-ipratropium (DUO-NEB) 2.5 mg-0.5 mg/3 ml nebu Take 3 mL by inhalation two (2) times a day. rosuvastatin (CRESTOR) 10 mg tablet Take 10 mg by mouth daily. isosorbide mononitrate ER (IMDUR) 30 mg tablet Take 1 Tab by mouth daily. amLODIPine (Norvasc) 10 mg tablet Take 1 Tab by mouth daily. carvedilol (COREG) 25 mg tablet Take 25 mg by mouth two (2) times daily (with meals). clopidogrel (PLAVIX) 75 mg tablet Take 1 Tab by mouth daily. levothyroxine (SYNTHROID) 25 mcg tablet Take 25 mcg by mouth Daily (before breakfast). Hydrochlorothiazide (HYDRODIURIL) 12.5 mg tablet Take 12.5 mg by mouth daily. nitroglycerin (NITROSTAT) 0.4 mg SL tablet 1 Tab by SubLINGual route every five (5) minutes as needed for Chest Pain. No current facility-administered medications for this visit. SYSTEM REVIEW NOT RELATED TO LIVER DISEASE OR REVIEWED ABOVE:  Constitution systems: Negative for fever, chills, weight gain, weight loss. Eyes: Negative for visual changes. ENT: Negative for sore throat, painful swallowing. Respiratory: Negative for cough, hemoptysis, SOB. Cardiology: Negative for chest pain, palpitations. GI:  Negative for constipation or diarrhea. : Negative for urinary frequency, dysuria, hematuria, nocturia. Skin: Negative for rash. Hematology: Negative for easy bruising, blood clots. Musculo-skelatal: Negative for back pain, muscle pain, weakness. Neurologic: Negative for headaches, dizziness, vertigo, memory problems not related to HE. Psychology: Negative for anxiety, depression. FAMILY HISTORY:  The father  of heart failure at age 67. The mother  of CVA in 2006 at age 61. There is no family history of liver disease.     There is no family history of immune disorders. SOCIAL HISTORY:  The patient has never been . The patient has 1 child and 4 grandchildren. The patient stopped using tobacco products in around May, 2020. The patient has been abstinent from alcohol since May, 2020. The patient does not work. The patient is currently receiving disability. PHYSICAL EXAMINATION:  Visit Vitals  BP (!) 144/80   Pulse 69   Temp 97.5 °F (36.4 °C)   Ht 5' 5\" (1.651 m)   Wt 154 lb (69.9 kg)   SpO2 94%   BMI 25.63 kg/m²     General: No acute distress. Eyes: Sclera anicteric. ENT: No oral lesions. Thyroid normal.  Nodes: No adenopathy. Skin: No spider angiomata. No jaundice. No palmar erythema. Respiratory: Lungs clear to auscultation. Cardiovascular: Regular heart rate. No murmurs. No JVD. Abdomen: Soft non-tender. Liver size normal to percussion/palpation. Spleen not palpable. Obvious ascites. Extremities: No edema. No muscle wasting. No gross arthritic changes. Neurologic: Alert and oriented. Cranial nerves grossly intact. No asterixis. LABORATORY STUDIES:  From 12/2022:  AST/ ALT/ ALP/ T. BILI/ ALB:  14// 9/ 78/ 0.3/ 3.6  BUN/ CRT/ PLT:  51/ 7.71/ 134,000    Liver [place] of [address] [place] [address] Ref Rng & Units 1/18/2022 7/19/2021   WBC 3.4 - 10.8 x10E3/uL 4.4 5.3   ANC 1.4 - 7.0 x10E3/uL 2.8 3.6   HGB 13.0 - 17.7 g/dL 8.7 (L) 13.0    - 450 x10E3/uL 193 281   INR 0.9 - 1.2 1.2    AST 0 - 40 IU/L 13 22   ALT 0 - 44 IU/L 9 15 (L)   Alk Phos 44 - 121 IU/L 113 117   Bili, Total 0.0 - 1.2 mg/dL 0.5 0.5   Bili, Direct 0.00 - 0.40 mg/dL 0.23    Albumin 3.8 - 4.9 g/dL 4.0 3.8   BUN 6 - 24 mg/dL 35 (H) 72 (H)   Creat 0.76 - 1.27 mg/dL 7.19 (H) 9.14 (H)   Na 134 - 144 mmol/L 142 136   K 3.5 - 5.2 mmol/L 5.2 4.9   Cl 96 - 106 mmol/L 102 100   CO2 20 - 29 mmol/L 25 24   Glucose 65 - 99 mg/dL 83 115 (H)   Magnesium 1.6 - 2.6 mg/dL  2.8 (H)   Ammonia 11 - 32 UMOL/L       SEROLOGIES:  Serologies Latest Ref Rng & Units 7/6/2020   Hep A Ab, Total Negative Positive (A)   Hep B Surface Ag Negative Negative   Hep B Core Ab, Total Negative Negative   Hep B Surface AB QL  Reactive   Hep C Genotype  1a   HCV RT-PCR, Quant IU/mL 598,000   LIANA, IFA       LIVER HISTOLOGY:  TRANSIENT HEPATIC ELASTOGRAPHY:   E Range: 5.5-9.1 kPa  E Mean: 6.7 kPa  E Median: 6.2 kPa  E Std: 1.2 kPa    01/2022. FibroScan performed at [facility]. EkPa was 16.2. IQR/med 15%. . The results suggested a fibrosis level of F4. The CAP score suggests there is no significant hepatic steatosis. 01/2023. FibroScan performed at [facility]. EkPa was 25.2. IQR/med 26%. . The results suggested a fibrosis level of F4. The CAP score suggests there is hepatic steatosis. ENDOSCOPIC PROCEDURES:  Not available or performed    RADIOLOGY:  12/2019. Abdominal/pelvic CT wo contrast.  The liver, gallbladder, spleen, pancreas and adrenal glands are grossly normal.  07/2020. Ultrasound of the liver. Diffusely heterogeneous echotexture. No focal mass. OTHER TESTING:  Not available or performed    FOLLOW-UP:  All of the issues listed above in the Assessment and Plan were discussed with the patient. All questions were answered. The patient expressed a clear understanding of the above. [email or web address] in 3 months.     TRISHA García-C  Liver [place] of [address], [address] [place] [address] Caty Oneill, [address] [place] [address]   [phone]

## 2023-01-19 LAB
AFP L3 MFR SERPL: NORMAL % (ref 0–9.9)
AFP SERPL-MCNC: 0.9 NG/ML (ref 0–8.4)

## 2023-03-01 ENCOUNTER — HOSPITAL ENCOUNTER (EMERGENCY)
Facility: HOSPITAL | Age: 58
Discharge: HOME | End: 2023-03-01
Attending: EMERGENCY MEDICINE
Payer: MEDICARE

## 2023-03-01 VITALS
DIASTOLIC BLOOD PRESSURE: 70 MMHG | HEART RATE: 61 BPM | BODY MASS INDEX: 24.99 KG/M2 | WEIGHT: 150 LBS | OXYGEN SATURATION: 97 % | RESPIRATION RATE: 19 BRPM | HEIGHT: 65 IN | SYSTOLIC BLOOD PRESSURE: 132 MMHG | TEMPERATURE: 97.1 F

## 2023-03-01 DIAGNOSIS — R20.2 PARESTHESIA OF ARM: Primary | ICD-10-CM

## 2023-03-01 LAB — GLUCOSE BLD STRIP.AUTO-MCNC: 106 MG/DL (ref 70–110)

## 2023-03-01 PROCEDURE — 82962 GLUCOSE BLOOD TEST: CPT

## 2023-03-01 PROCEDURE — 99282 EMERGENCY DEPT VISIT SF MDM: CPT

## 2023-03-01 ASSESSMENT — PAIN - FUNCTIONAL ASSESSMENT: PAIN_FUNCTIONAL_ASSESSMENT: NONE - DENIES PAIN

## 2023-03-01 NOTE — ED TRIAGE NOTES
Pt report that during dialysis session, left arm got numb and cold. Pt had 1 hour of dialysis session, pt verbalized that numbness, tingling and cold sensation persist.  HX of prior stroke with left side weakness. AV fistula noted to left upper arm.

## 2023-03-01 NOTE — DISCHARGE INSTRUCTIONS
I believe the cause of your symptoms were decreased blood flow to your left hand that was temporary related to dialysis. It appears her symptoms have resolved. You have normal strength, sensation and blood flow at the time of my exam.  I have low suspicion for a stroke at this time. Consider warm compress for residual numbness, tingling, pain to promote blood flow, stretching, elevation of your left arm.

## 2023-03-01 NOTE — ED PROVIDER NOTES
EMERGENCY DEPARTMENT HISTORY AND PHYSICAL EXAM      Date: 3/1/2023  Patient Name: Shanique Nielson      History of Presenting Illness     Chief Complaint   Patient presents with    Numbness       Location/Duration/Severity/Modifying factors   Chief Complaint   Patient presents with    Numbness       HPI:  Shanique Nielson is a 61 y.o. male with PMH significant for end-stage renal disease on dialysis, CVA 12 years ago presents with onset of left forearm and hand numbness, tingling, cold sensation while completing dialysis around 11 AM. Symptoms are associated with no other symptoms including facial droop, weakness of the left hand or arm, abnormal speech pattern, change in skin color on the left upper extremity. States feels different than prior CVA 12 years ago. Symptoms have since completely resolved after elevating and stretching his left arm and shoulder. PCP: Taylor Munguia MD    No current facility-administered medications for this encounter. No current outpatient medications on file.        Past History     Past Medical History:  Past Medical History:   Diagnosis Date    CAD (coronary artery disease)     Essential hypertension     Hypercholesterolemia     Hyperlipidemia     Hypertension     Myocardial infarct ([facility] [address]     Peripheral vascular disease ([facility] [address]     s/p left iliac stenting x 3    Psychiatric disorder     depression    Stroke [facility])        Past Surgical History:  Past Surgical History:   Procedure Laterality Date    CARDIAC CATHETERIZATION      CORONARY ANGIOPLASTY WITH STENT PLACEMENT      2 STENTS 2012    [place] UNLISTED PROCEDURE CARDIAC SURGERY      PTCA         Family History:  Family History   Problem Relation Age of Onset    Heart Attack Father        Social History:  Social History     Tobacco Use    Smoking status: Former    Smokeless tobacco: Never   Substance Use Topics    Alcohol use: Not Currently    Drug use: Not Currently       Allergies:  No Known Allergies      Review of Systems     All other systems reviewed and found to be negative except as noted above. Physical Exam     General: Patient is awake and alert, resting comfortably in no acute distress  Head: Normocephalic and atraumatic  Eyes: Extraocular muscles intact, no conjunctival pallor  Ear, nose, throat: Normal external exam  Neck: Normal range of motion, no tenderness palpation of paracervical musculature, cervical spine, no palpable step-offs. Cardiovascular: RRR, warm, well-perfused extremities, asymmetric radial pulses, weaker on the left compared to the right but normal capillary refill less than 3 seconds in fingertips. Respiratory: Patient is in no respiratory distress, normal respiratory effort  MSK: No gross deformities appreciated. No edema to the left upper extremity, full range of motion of left shoulder and elbow joints. Extremities: Palpable thrill to left medial upper arm dialysis fistula site. Skin: Warm, dry, and intact  Neuro: The patient is alert and oriented, full strength with bilateral hand , no pronator drift, non-ataxic gait, no facial asymmetry, normal speech, logical speech pattern. Sensation intact light touch along upper extremities diffusely. Psych: Appropriate mood and affect. Lab and Diagnostic Study Results     Labs -  Recent Results (from the past 24 hour(s))   POCT Glucose    Collection Time: 03/01/23  1:44 PM   Result Value Ref Range    POC Glucose 106 70 - 110 mg/dL         Radiologic Studies -   No orders to display         Procedures and Critical Care       Performed by: Lisa Swan DO    Procedures       Lisa Swan DO    Medical Decision Making and ED Course   - I am the first and primary provider for this patient AND AM THE PRIMARY PROVIDER OF RECORD. - I reviewed the vital signs, available nursing notes, past medical history, past surgical history, family history and social history. - Initial assessment performed.  The patients presenting problems have been discussed, and the staff are in agreement with the care plan formulated and outlined with them. I have encouraged them to ask questions as they arise throughout their visit. Vital Signs-Reviewed the patient's vital signs. Patient Vitals for the past 12 hrs:   Temp Pulse Resp BP SpO2   03/01/23 1338 97.1 °F (36.2 °C) 61 19 132/70 97 %         Provider Notes (Medical Decision Making):   Initial differential diagnosis included: Radiculopathy, subclavian steal syndrome, muscle strain, peripheral neuropathy. Clinical presentation most consistent with subclavian steal syndrome from left upper arm dialysis fistula site. Critical diagnoses also considered but deemed unlikely include acute CVA due to absence of any residual motor or sensory deficits, complete resolution of symptoms, relation to dialysis session, distal to AV fistula site acute arterial occlusion. Notable laboratory findings include elevated point-of-care glucose. Medications administered during this ED encounter include none. Treatment plan is port of care with warm compress, stretching. Discussed return precautions. [facility]         ED Course:          ------------------------------------------------------------------------------------------------------------        Consultations:       Consultations: None      Disposition         DISPOSITION Decision To Discharge 03/01/2023 03:39:06 PM        Diagnosis     Clinical Impression:   1.  Paresthesia of arm        Attestations:    Werner Hector DO  Emergency Physician  [place], [place]  03/01/23 1546

## 2023-04-18 ENCOUNTER — HOSPITAL ENCOUNTER (OUTPATIENT)
Facility: HOSPITAL | Age: 58
Setting detail: SPECIMEN
Discharge: HOME | End: 2023-04-21
Payer: MEDICARE

## 2023-04-18 ENCOUNTER — OFFICE VISIT (OUTPATIENT)
Age: 58
End: 2023-04-18
Payer: MEDICARE

## 2023-04-18 VITALS
RESPIRATION RATE: 20 BRPM | BODY MASS INDEX: 24.64 KG/M2 | HEART RATE: 60 BPM | OXYGEN SATURATION: 97 % | HEIGHT: 65 IN | DIASTOLIC BLOOD PRESSURE: 70 MMHG | SYSTOLIC BLOOD PRESSURE: 142 MMHG | WEIGHT: 147.9 LBS

## 2023-04-18 DIAGNOSIS — K76.1 CHRONIC PASSIVE HEPATIC CONGESTION: ICD-10-CM

## 2023-04-18 PROBLEM — K74.60 CIRRHOSIS (HCC): Status: RESOLVED | Noted: 2023-01-17 | Resolved: 2023-04-18

## 2023-04-18 LAB
ALBUMIN SERPL-MCNC: 3.9 G/DL (ref 3.4–5)
ALBUMIN/GLOB SERPL: 0.8 (ref 0.8–1.7)
ALP SERPL-CCNC: 113 U/L (ref 45–117)
ALT SERPL-CCNC: 11 U/L (ref 16–61)
ANION GAP SERPL CALC-SCNC: 5 MMOL/L (ref 3–18)
AST SERPL-CCNC: 12 U/L (ref 10–38)
BASOPHILS # BLD: 0 K/UL (ref 0–0.1)
BASOPHILS NFR BLD: 1 % (ref 0–2)
BILIRUB DIRECT SERPL-MCNC: 0.4 MG/DL (ref 0–0.2)
BILIRUB SERPL-MCNC: 0.8 MG/DL (ref 0.2–1)
BUN SERPL-MCNC: 38 MG/DL (ref 7–18)
BUN/CREAT SERPL: 5 (ref 12–20)
CALCIUM SERPL-MCNC: 9.4 MG/DL (ref 8.5–10.1)
CHLORIDE SERPL-SCNC: 103 MMOL/L (ref 100–111)
CO2 SERPL-SCNC: 29 MMOL/L (ref 21–32)
CREAT SERPL-MCNC: 7.27 MG/DL (ref 0.6–1.3)
DIFFERENTIAL METHOD BLD: ABNORMAL
EOSINOPHIL # BLD: 0.1 K/UL (ref 0–0.4)
EOSINOPHIL NFR BLD: 4 % (ref 0–5)
ERYTHROCYTE [DISTWIDTH] IN BLOOD BY AUTOMATED COUNT: 16.1 % (ref 11.6–14.5)
GLOBULIN SER CALC-MCNC: 4.8 G/DL (ref 2–4)
GLUCOSE SERPL-MCNC: 83 MG/DL (ref 74–99)
HCT VFR BLD AUTO: 35.4 % (ref 36–48)
HGB BLD-MCNC: 11.1 G/DL (ref 13–16)
IMM GRANULOCYTES # BLD AUTO: 0 K/UL (ref 0–0.04)
IMM GRANULOCYTES NFR BLD AUTO: 0 % (ref 0–0.5)
INR PPP: 1.3 (ref 0.8–1.2)
LYMPHOCYTES # BLD: 0.7 K/UL (ref 0.9–3.6)
LYMPHOCYTES NFR BLD: 20 % (ref 21–52)
MCH RBC QN AUTO: 25.3 PG (ref 24–34)
MCHC RBC AUTO-ENTMCNC: 31.4 G/DL (ref 31–37)
MCV RBC AUTO: 80.6 FL (ref 78–100)
MONOCYTES # BLD: 0.4 K/UL (ref 0.05–1.2)
MONOCYTES NFR BLD: 11 % (ref 3–10)
NEUTS SEG # BLD: 2.2 K/UL (ref 1.8–8)
NEUTS SEG NFR BLD: 64 % (ref 40–73)
NRBC # BLD: 0 K/UL (ref 0–0.01)
NRBC BLD-RTO: 0 PER 100 WBC
PLATELET # BLD AUTO: 125 K/UL (ref 135–420)
PMV BLD AUTO: 10.5 FL (ref 9.2–11.8)
POTASSIUM SERPL-SCNC: 3.8 MMOL/L (ref 3.5–5.5)
PROT SERPL-MCNC: 8.7 G/DL (ref 6.4–8.2)
PROTHROMBIN TIME: 16.2 SEC (ref 11.5–15.2)
RBC # BLD AUTO: 4.39 M/UL (ref 4.35–5.65)
SODIUM SERPL-SCNC: 137 MMOL/L (ref 136–145)
WBC # BLD AUTO: 3.5 K/UL (ref 4.6–13.2)

## 2023-04-18 PROCEDURE — G8420 CALC BMI NORM PARAMETERS: HCPCS | Performed by: NURSE PRACTITIONER

## 2023-04-18 PROCEDURE — 80048 BASIC METABOLIC PNL TOTAL CA: CPT

## 2023-04-18 PROCEDURE — 3078F DIAST BP <80 MM HG: CPT | Performed by: NURSE PRACTITIONER

## 2023-04-18 PROCEDURE — 36415 COLL VENOUS BLD VENIPUNCTURE: CPT

## 2023-04-18 PROCEDURE — 85025 COMPLETE CBC W/AUTO DIFF WBC: CPT

## 2023-04-18 PROCEDURE — 80076 HEPATIC FUNCTION PANEL: CPT

## 2023-04-18 PROCEDURE — 4004F PT TOBACCO SCREEN RCVD TLK: CPT | Performed by: NURSE PRACTITIONER

## 2023-04-18 PROCEDURE — 85610 PROTHROMBIN TIME: CPT

## 2023-04-18 PROCEDURE — 3017F COLORECTAL CA SCREEN DOC REV: CPT | Performed by: NURSE PRACTITIONER

## 2023-04-18 PROCEDURE — 3077F SYST BP >= 140 MM HG: CPT | Performed by: NURSE PRACTITIONER

## 2023-04-18 PROCEDURE — 99214 OFFICE O/P EST MOD 30 MIN: CPT | Performed by: NURSE PRACTITIONER

## 2023-04-18 PROCEDURE — 82107 ALPHA-FETOPROTEIN L3: CPT

## 2023-04-18 PROCEDURE — G8428 CUR MEDS NOT DOCUMENT: HCPCS | Performed by: NURSE PRACTITIONER

## 2023-04-18 RX ORDER — SEVELAMER CARBONATE 800 MG/1
TABLET, FILM COATED ORAL
COMMUNITY
Start: 2023-01-25

## 2023-04-18 NOTE — PROGRESS NOTES
chest pain, pain in the right side over the liver, diffuse abdominal pain, nausea, vomiting, constipation, diarrhrea, dry eyes, dry mouth, arthralgias, myalgias, yellowing of the eyes or skin, itching, dark urine, problems concentrating, swelling of the abdomen, swelling of the lower extremities, hematemesis, or hematochezia. Since the last office appointment the patient:  Had no changes in the liver disease. ASSESSMENT AND PLAN:  Chronic HCV   Chronic HCV with normal to mild hepatic fibrosis. The most recent laboratory studies indicate that the liver transaminases are normal, alkaline phosphatase is normal, tests of hepatic synthetic and metabolic function are normal, and the platelet count is normal.      Will perform laboratory testing to monitor liver function and degree of liver injury. Chronic HCV Treatment  The patient had genotype 1A. He began antiviral therapy around 10/13/2020. He was treated with Epclusa. This is the only time the HCV has ever been treated. He denies missing any doses of the Epclusa and he had no treatment related complaints. HCV eradicated. Fibroscan  The need to perform an assessment of liver fibrosis was discussed with the patient. The Fibroscan can assess liver fibrosis and determine if a patient has advanced fibrosis or cirrhosis without the need for liver biopsy. This was performed during the patient's 01/2023 appointment. Results of the scan indicate cirrhosis, however this is not consistent with labs or imaging. The Fibroscan can be repeated annually or as often as clinically indicated to assess for fibrosis progression and/or regression. Based upon laboratory studies, imaging, and shear wave elastography, the patient does not have significant liver injury. However, the last two fibroscans indicate that the patient has cirrhosis. I do not believe that the patient has cirrhosis. This is based on labs and imaging.   ECHO performed in

## 2023-04-20 LAB
AFP L3 MFR SERPL: NORMAL % (ref 0–9.9)
AFP SERPL-MCNC: 1.3 NG/ML (ref 0–8.4)

## 2023-07-25 ENCOUNTER — HOSPITAL ENCOUNTER (OUTPATIENT)
Facility: HOSPITAL | Age: 58
Setting detail: SPECIMEN
Discharge: HOME | End: 2023-07-28
Payer: MEDICARE

## 2023-07-25 ENCOUNTER — OFFICE VISIT (OUTPATIENT)
Age: 58
End: 2023-07-25
Payer: MEDICARE

## 2023-07-25 VITALS
TEMPERATURE: 96.9 F | WEIGHT: 149 LBS | BODY MASS INDEX: 24.83 KG/M2 | HEIGHT: 65 IN | HEART RATE: 57 BPM | DIASTOLIC BLOOD PRESSURE: 60 MMHG | OXYGEN SATURATION: 95 % | SYSTOLIC BLOOD PRESSURE: 136 MMHG

## 2023-07-25 DIAGNOSIS — K76.1 CHRONIC PASSIVE HEPATIC CONGESTION: Primary | ICD-10-CM

## 2023-07-25 DIAGNOSIS — K76.1 CHRONIC PASSIVE HEPATIC CONGESTION: ICD-10-CM

## 2023-07-25 DIAGNOSIS — Z11.59 ENCOUNTER FOR SCREENING FOR OTHER VIRAL DISEASES: ICD-10-CM

## 2023-07-25 LAB
ALBUMIN SERPL-MCNC: 3.7 G/DL (ref 3.4–5)
ALBUMIN/GLOB SERPL: 0.8 (ref 0.8–1.7)
ALP SERPL-CCNC: 114 U/L (ref 45–117)
ALT SERPL-CCNC: 14 U/L (ref 16–61)
ANION GAP SERPL CALC-SCNC: 8 MMOL/L (ref 3–18)
AST SERPL-CCNC: 13 U/L (ref 10–38)
BASOPHILS # BLD: 0 K/UL (ref 0–0.1)
BASOPHILS NFR BLD: 1 % (ref 0–2)
BILIRUB DIRECT SERPL-MCNC: 0.4 MG/DL (ref 0–0.2)
BILIRUB SERPL-MCNC: 0.7 MG/DL (ref 0.2–1)
BUN SERPL-MCNC: 49 MG/DL (ref 7–18)
BUN/CREAT SERPL: 6 (ref 12–20)
CALCIUM SERPL-MCNC: 9 MG/DL (ref 8.5–10.1)
CHLORIDE SERPL-SCNC: 104 MMOL/L (ref 100–111)
CO2 SERPL-SCNC: 27 MMOL/L (ref 21–32)
CREAT SERPL-MCNC: 7.87 MG/DL (ref 0.6–1.3)
DIFFERENTIAL METHOD BLD: ABNORMAL
EOSINOPHIL # BLD: 0.2 K/UL (ref 0–0.4)
EOSINOPHIL NFR BLD: 4 % (ref 0–5)
ERYTHROCYTE [DISTWIDTH] IN BLOOD BY AUTOMATED COUNT: 16.5 % (ref 11.6–14.5)
GLOBULIN SER CALC-MCNC: 4.5 G/DL (ref 2–4)
GLUCOSE SERPL-MCNC: 96 MG/DL (ref 74–99)
HCT VFR BLD AUTO: 32.5 % (ref 36–48)
HGB BLD-MCNC: 10.4 G/DL (ref 13–16)
IMM GRANULOCYTES # BLD AUTO: 0 K/UL (ref 0–0.04)
IMM GRANULOCYTES NFR BLD AUTO: 0 % (ref 0–0.5)
INR PPP: 1.3 (ref 0.8–1.2)
LYMPHOCYTES # BLD: 0.6 K/UL (ref 0.9–3.6)
LYMPHOCYTES NFR BLD: 16 % (ref 21–52)
MCH RBC QN AUTO: 25.4 PG (ref 24–34)
MCHC RBC AUTO-ENTMCNC: 32 G/DL (ref 31–37)
MCV RBC AUTO: 79.5 FL (ref 78–100)
MONOCYTES # BLD: 0.5 K/UL (ref 0.05–1.2)
MONOCYTES NFR BLD: 14 % (ref 3–10)
NEUTS SEG # BLD: 2.5 K/UL (ref 1.8–8)
NEUTS SEG NFR BLD: 65 % (ref 40–73)
NRBC # BLD: 0 K/UL (ref 0–0.01)
NRBC BLD-RTO: 0 PER 100 WBC
PLATELET # BLD AUTO: 158 K/UL (ref 135–420)
PMV BLD AUTO: 12 FL (ref 9.2–11.8)
POTASSIUM SERPL-SCNC: 4.2 MMOL/L (ref 3.5–5.5)
PROT SERPL-MCNC: 8.2 G/DL (ref 6.4–8.2)
PROTHROMBIN TIME: 16.4 SEC (ref 11.5–15.2)
RBC # BLD AUTO: 4.09 M/UL (ref 4.35–5.65)
SODIUM SERPL-SCNC: 139 MMOL/L (ref 136–145)
WBC # BLD AUTO: 3.8 K/UL (ref 4.6–13.2)

## 2023-07-25 PROCEDURE — 85025 COMPLETE CBC W/AUTO DIFF WBC: CPT

## 2023-07-25 PROCEDURE — 3074F SYST BP LT 130 MM HG: CPT | Performed by: NURSE PRACTITIONER

## 2023-07-25 PROCEDURE — 36415 COLL VENOUS BLD VENIPUNCTURE: CPT

## 2023-07-25 PROCEDURE — 3078F DIAST BP <80 MM HG: CPT | Performed by: NURSE PRACTITIONER

## 2023-07-25 PROCEDURE — 86706 HEP B SURFACE ANTIBODY: CPT

## 2023-07-25 PROCEDURE — 82107 ALPHA-FETOPROTEIN L3: CPT

## 2023-07-25 PROCEDURE — 99214 OFFICE O/P EST MOD 30 MIN: CPT | Performed by: NURSE PRACTITIONER

## 2023-07-25 PROCEDURE — 80048 BASIC METABOLIC PNL TOTAL CA: CPT

## 2023-07-25 PROCEDURE — 86704 HEP B CORE ANTIBODY TOTAL: CPT

## 2023-07-25 PROCEDURE — 85610 PROTHROMBIN TIME: CPT

## 2023-07-25 PROCEDURE — 80076 HEPATIC FUNCTION PANEL: CPT

## 2023-07-25 ASSESSMENT — PATIENT HEALTH QUESTIONNAIRE - PHQ9
SUM OF ALL RESPONSES TO PHQ QUESTIONS 1-9: 0
1. LITTLE INTEREST OR PLEASURE IN DOING THINGS: 0
SUM OF ALL RESPONSES TO PHQ QUESTIONS 1-9: 0
2. FEELING DOWN, DEPRESSED OR HOPELESS: 0
SUM OF ALL RESPONSES TO PHQ9 QUESTIONS 1 & 2: 0

## 2023-07-25 NOTE — PROGRESS NOTES
30   Glucose 74 - 99 mg/dL 78   Magnesium 1.6 - 2.6 mg/dL    Ammonia 11 - 32 UMOL/L      SEROLOGIES:  Serologies Latest Ref Rng & Units 7/6/2020   Hep A Ab, Total Negative Positive (A)   Hep B Surface Ag Negative Negative   Hep B Core Ab, Total Negative Negative   Hep B Surface AB QL  Reactive   Hep C Genotype  1a   HCV RT-PCR, Quant IU/mL 598,000   CHRIS, IFA       LIVER HISTOLOGY:  TRANSIENT HEPATIC ELASTOGRAPHY:   E Range: 5.5-9.1 kPa  E Mean: 6.7 kPa  E Median: 6.2 kPa  E Std: 1.2 kPa    01/2022. FibroScan performed at [facility]. EkPa was 16.2. IQR/med 15%. . The results suggested a fibrosis level of F4. The CAP score suggests there is no significant hepatic steatosis. 01/2023. FibroScan performed at [facility]. EkPa was 25.2. IQR/med 26%. . The results suggested a fibrosis level of F4. The CAP score suggests there is hepatic steatosis. ENDOSCOPIC PROCEDURES:  Not available or performed    RADIOLOGY:  12/2019. Abdominal/pelvic CT wo contrast.  The liver, gallbladder, spleen, pancreas and adrenal glands are grossly normal.  07/2020. Ultrasound of the liver. Diffusely heterogeneous echotexture. No focal mass. 07/2021. Abdominal CT. Liver is normal in unenhanced appearance. OTHER TESTING:  Not available or performed    FOLLOW-UP:  All of the issues listed above in the Assessment and Plan were discussed with the patient. All questions were answered. The patient expressed a clear understanding of the above. [place] in 6 months.     Angela Jernigan, TOMASZP-C  Liver [place] of [facility]  [address], [address] [place]   [address], [address] [place] [address]   [phone]

## 2023-07-26 LAB
HBV CORE AB SERPL QL IA: NEGATIVE
HBV SURFACE AB SER QL IA: NEGATIVE
HBV SURFACE AB SERPL IA-ACNC: 5.36 MIU/ML
Lab: ABNORMAL

## 2023-07-27 LAB
AFP L3 MFR SERPL: NORMAL % (ref 0–9.9)
AFP SERPL-MCNC: 3.7 NG/ML (ref 0–8.4)

## 2023-08-03 ENCOUNTER — TELEPHONE (OUTPATIENT)
Age: 58
End: 2023-08-03

## 2023-08-03 NOTE — TELEPHONE ENCOUNTER
Sister stated that they need a para, was just informed that it will not be covered at [facility] anymore, was curious if a referral for a para could be sent to [facility]. So that insurance could cover it.

## 2024-01-09 ENCOUNTER — HOSPITAL ENCOUNTER (OUTPATIENT)
Facility: HOSPITAL | Age: 59
Setting detail: SPECIMEN
Discharge: HOME | End: 2024-01-12

## 2024-01-09 ENCOUNTER — OFFICE VISIT (OUTPATIENT)
Age: 59
End: 2024-01-09

## 2024-01-09 VITALS
WEIGHT: 151 LBS | SYSTOLIC BLOOD PRESSURE: 122 MMHG | HEIGHT: 65 IN | TEMPERATURE: 97.3 F | OXYGEN SATURATION: 93 % | HEART RATE: 62 BPM | DIASTOLIC BLOOD PRESSURE: 63 MMHG | BODY MASS INDEX: 25.16 KG/M2

## 2024-01-09 DIAGNOSIS — K76.1 CHRONIC PASSIVE HEPATIC CONGESTION: ICD-10-CM

## 2024-01-09 DIAGNOSIS — K76.1 CHRONIC PASSIVE HEPATIC CONGESTION: Primary | ICD-10-CM

## 2024-01-09 PROBLEM — E03.9 HYPOTHYROIDISM: Status: ACTIVE | Noted: 2024-01-09

## 2024-01-09 LAB — LABCORP SPECIMEN COLLECTION: NORMAL

## 2024-01-09 NOTE — PROGRESS NOTES
122/63   Pulse 62   Temp 97.3 °F (36.3 °C)   Ht 1.651 m (5' 5\")   Wt 68.5 kg (151 lb)   SpO2 93%   BMI 25.13 kg/m²     General: No acute distress.   Eyes: Sclera anicteric.   ENT: No oral lesions.  Thyroid normal.  Nodes: No adenopathy.   Skin: No spider angiomata.  No jaundice.  No palmar erythema.  Respiratory: Lungs clear to auscultation.   Cardiovascular: Regular heart rate.  No murmurs.  No JVD.  Abdomen: Soft non-tender.  Liver size normal to percussion/palpation.  Spleen not palpable. Obvious ascites.  Extremities: No edema.  No muscle wasting.  No gross arthritic changes.  Neurologic: Alert and oriented.  Cranial nerves grossly intact.  No asterixis.    LABORATORY STUDIES:   Latest Ref Rng 1/18/2022 1/17/2023 4/18/2023 7/25/2023   COLE[facility] - Routine Labs        WBC 4.6 - 13.2 K/uL 4.4  6.0  3.5 (L)  3.8 (L)    ANC 1.8 - 8.0 K/UL 2.8  4.5   2.5    ANC 1.8 - 8.0 K/UL   2.2     HGB 13.0 - 16.0 g/dL 8.7 (L)  9.7 (L)  11.1 (L)  10.4 (L)     - 420 K/uL 193  211  125 (L)  158    INR 0.8 - 1.2  1.2  1.4 (H)  1.3 (H)  1.3 (H)    AST 10 - 38 U/L 13  17  12  13    ALT 16 - 61 U/L 9  17  11 (L)  14 (L)    Alk Phos 45 - 117 U/L 113  96  113  114    Bili, Total 0.2 - 1.0 MG/DL 0.5  0.8  0.8  0.7    Bili, Direct 0.0 - 0.2 MG/DL 0.23  0.3 (H)  0.4 (H)  0.4 (H)    Albumin 3.4 - 5.0 g/dL 4.0  3.1 (L)  3.9  3.7    BUN 7.0 - 18 MG/DL 35 (H)  46 (H)  38 (H)  49 (H)    Creat 0.6 - 1.3 MG/DL 7.19 (H)  6.92 (H)  7.27 (H)  7.87 (H)    Na 136 - 145 mmol/L 142  139  137  139    K 3.5 - 5.5 mmol/L 5.2  4.2  3.8  4.2    Cl 100 - 111 mmol/L 102  102  103  104    CO2 21 - 32 mmol/L 25  30  29  27    Glucose 74 - 99 mg/dL 83  78  83  96    Magnesium 1.6 - 2.6 mg/dL       Ammonia 11 - 32 UMOL/L          Latest Ref Rng 1/18/2022 1/17/2023 4/18/2023 7/25/2023   [facility] - Cancer Screening        AFP 0.0 - 8.4 ng/mL 1.3  0.9  1.3  3.7    AFP-L3% 0.0 - 9.9 % Comment  Comment  Comment  Comment      SEROLOGIES:  Serologies Latest Ref Rng &

## 2024-01-10 LAB
ALBUMIN SERPL-MCNC: 4.2 G/DL (ref 3.8–4.9)
ALP SERPL-CCNC: 153 IU/L (ref 44–121)
ALT SERPL-CCNC: 9 IU/L (ref 0–44)
AST SERPL-CCNC: 12 IU/L (ref 0–40)
BASOPHILS # BLD AUTO: 0 X10E3/UL (ref 0–0.2)
BASOPHILS NFR BLD AUTO: 1 %
BILIRUB DIRECT SERPL-MCNC: 0.31 MG/DL (ref 0–0.4)
BILIRUB SERPL-MCNC: 0.5 MG/DL (ref 0–1.2)
BUN SERPL-MCNC: 42 MG/DL (ref 6–24)
BUN/CREAT SERPL: 7 (ref 9–20)
CALCIUM SERPL-MCNC: 9 MG/DL (ref 8.7–10.2)
CHLORIDE SERPL-SCNC: 99 MMOL/L (ref 96–106)
CO2 SERPL-SCNC: 26 MMOL/L (ref 20–29)
CREAT SERPL-MCNC: 6.31 MG/DL (ref 0.76–1.27)
EGFRCR SERPLBLD CKD-EPI 2021: 10 ML/MIN/1.73
EOSINOPHIL # BLD AUTO: 0.2 X10E3/UL (ref 0–0.4)
EOSINOPHIL NFR BLD AUTO: 6 %
ERYTHROCYTE [DISTWIDTH] IN BLOOD BY AUTOMATED COUNT: 15.8 % (ref 11.6–15.4)
GLUCOSE SERPL-MCNC: 82 MG/DL (ref 70–99)
HBV DNA SERPL NAA+PROBE-ACNC: NORMAL IU/ML
HBV DNA SERPL NAA+PROBE-LOG IU: NORMAL LOG10 IU/ML
HBV SURFACE AB SER QL: REACTIVE
HCT VFR BLD AUTO: 32.2 % (ref 37.5–51)
HGB BLD-MCNC: 10.2 G/DL (ref 13–17.7)
IMM GRANULOCYTES # BLD AUTO: 0 X10E3/UL (ref 0–0.1)
IMM GRANULOCYTES NFR BLD AUTO: 0 %
INR PPP: 1.2 (ref 0.9–1.2)
LYMPHOCYTES # BLD AUTO: 0.7 X10E3/UL (ref 0.7–3.1)
LYMPHOCYTES NFR BLD AUTO: 18 %
MCH RBC QN AUTO: 24.8 PG (ref 26.6–33)
MCHC RBC AUTO-ENTMCNC: 31.7 G/DL (ref 31.5–35.7)
MCV RBC AUTO: 78 FL (ref 79–97)
MONOCYTES # BLD AUTO: 0.5 X10E3/UL (ref 0.1–0.9)
MONOCYTES NFR BLD AUTO: 12 %
NEUTROPHILS # BLD AUTO: 2.4 X10E3/UL (ref 1.4–7)
NEUTROPHILS NFR BLD AUTO: 63 %
PLATELET # BLD AUTO: 130 X10E3/UL (ref 150–450)
POTASSIUM SERPL-SCNC: 4.1 MMOL/L (ref 3.5–5.2)
PROT SERPL-MCNC: 7.8 G/DL (ref 6–8.5)
PROTHROMBIN TIME: 13.2 SEC (ref 9.1–12)
RBC # BLD AUTO: 4.11 X10E6/UL (ref 4.14–5.8)
REF LAB TEST REF RANGE: NORMAL
SODIUM SERPL-SCNC: 142 MMOL/L (ref 134–144)
WBC # BLD AUTO: 3.9 X10E3/UL (ref 3.4–10.8)

## 2024-01-11 LAB
AFP L3 MFR SERPL: NORMAL % (ref 0–9.9)
AFP SERPL-MCNC: 1.1 NG/ML (ref 0–8.4)

## 2024-01-16 ENCOUNTER — HOSPITAL ENCOUNTER (OUTPATIENT)
Facility: HOSPITAL | Age: 59
Discharge: HOME | End: 2024-01-16
Attending: INTERNAL MEDICINE | Admitting: INTERNAL MEDICINE
Payer: MEDICARE

## 2024-01-16 VITALS
HEART RATE: 61 BPM | TEMPERATURE: 98 F | HEIGHT: 65 IN | WEIGHT: 152 LBS | BODY MASS INDEX: 25.33 KG/M2 | DIASTOLIC BLOOD PRESSURE: 60 MMHG | RESPIRATION RATE: 20 BRPM | OXYGEN SATURATION: 99 % | SYSTOLIC BLOOD PRESSURE: 131 MMHG

## 2024-01-16 DIAGNOSIS — K76.1 CHRONIC PASSIVE HEPATIC CONGESTION: ICD-10-CM

## 2024-01-16 LAB
APPEARANCE FLD: ABNORMAL
COLOR FLD: YELLOW
EOSINOPHIL NFR FLD MANUAL: 0 %
LYMPHOCYTES NFR FLD: 15 %
MACROPHAGES NFR FLD: 82 %
MONOCYTES NFR FLD: 2 %
NEUTROPHILS NFR FLD: 1 %
NEUTS BAND # FLD: 0 %
NUC CELL # FLD: 386 /CU MM
RBC # FLD: 3000 /CU MM
SPECIMEN SOURCE FLD: ABNORMAL

## 2024-01-16 PROCEDURE — 89051 BODY FLUID CELL COUNT: CPT

## 2024-01-16 PROCEDURE — 6360000002 HC RX W HCPCS: Performed by: INTERNAL MEDICINE

## 2024-01-16 PROCEDURE — 2500000003 HC RX 250 WO HCPCS

## 2024-01-16 PROCEDURE — 7100000010 HC PHASE II RECOVERY - FIRST 15 MIN

## 2024-01-16 PROCEDURE — 49083 ABD PARACENTESIS W/IMAGING: CPT

## 2024-01-16 PROCEDURE — P9047 ALBUMIN (HUMAN), 25%, 50ML: HCPCS | Performed by: INTERNAL MEDICINE

## 2024-01-16 RX ORDER — ALBUMIN (HUMAN) 12.5 G/50ML
25 SOLUTION INTRAVENOUS AS NEEDED
Status: DISCONTINUED | OUTPATIENT
Start: 2024-01-16 | End: 2024-01-16 | Stop reason: HOSPADM

## 2024-01-16 RX ORDER — ALBUMIN (HUMAN) 12.5 G/50ML
25 SOLUTION INTRAVENOUS ONCE
Status: COMPLETED | OUTPATIENT
Start: 2024-01-16 | End: 2024-01-16

## 2024-01-16 RX ORDER — LIDOCAINE HYDROCHLORIDE 10 MG/ML
10 INJECTION, SOLUTION EPIDURAL; INFILTRATION; INTRACAUDAL; PERINEURAL ONCE
Status: COMPLETED | OUTPATIENT
Start: 2024-01-16 | End: 2024-01-16

## 2024-01-16 RX ADMIN — LIDOCAINE HYDROCHLORIDE ANHYDROUS 7 ML: 10 INJECTION, SOLUTION INFILTRATION at 13:55

## 2024-01-16 RX ADMIN — ALBUMIN (HUMAN) 25 G: 0.25 INJECTION, SOLUTION INTRAVENOUS at 13:02

## 2024-01-16 NOTE — PROGRESS NOTES
Pt is all prepped and ready for procedure.     1345 Pt picked up for procedure.    1430 Pt back from procedure and tolerated well. Band-aid to rt abdomen dry and intact    1500 Pt discharged home in the care of sister, escorted to car and left with sister in stable condition.

## 2024-01-16 NOTE — PROGRESS NOTES
Ultrasound guided RLQ paracentesis was performed.  Pt tolerated procedure well, no signs of distress.  14 mL of ascites fluid was collected and sent to lab for cell count and diff.  3,100 mL of fluid drained.  Pt returned to Care Unit in stable condition.

## 2024-01-23 ENCOUNTER — HOSPITAL ENCOUNTER (OUTPATIENT)
Facility: HOSPITAL | Age: 59
Discharge: HOME | End: 2024-01-26
Payer: MEDICARE

## 2024-01-23 DIAGNOSIS — K76.1 CHRONIC PASSIVE HEPATIC CONGESTION: ICD-10-CM

## 2024-01-23 PROCEDURE — 76705 ECHO EXAM OF ABDOMEN: CPT

## 2024-04-09 ENCOUNTER — CLINICAL DOCUMENTATION (OUTPATIENT)
Age: 59
End: 2024-04-09

## 2024-04-09 NOTE — PROGRESS NOTES
Returned patients call from a voicemail on 4/9/24 left detailed message regarding next appointment  
No

## 2024-05-22 ENCOUNTER — TELEPHONE (OUTPATIENT)
Age: 59
End: 2024-05-22

## 2024-05-22 NOTE — TELEPHONE ENCOUNTER
[facility] called the office and left a voicemail stating that Mr Kauffman had went to the Emergency Room and while there, they discovered that he had fluid on his liver. His appointment for 5/23/2024 was cancelled

## 2024-05-23 ENCOUNTER — HOSPITAL ENCOUNTER (EMERGENCY)
Facility: HOSPITAL | Age: 59
Discharge: HOME | End: 2024-05-23
Attending: EMERGENCY MEDICINE
Payer: MEDICARE

## 2024-05-23 ENCOUNTER — APPOINTMENT (OUTPATIENT)
Facility: HOSPITAL | Age: 59
End: 2024-05-23
Payer: MEDICARE

## 2024-05-23 ENCOUNTER — HOSPITAL ENCOUNTER (EMERGENCY)
Facility: HOSPITAL | Age: 59
End: 2024-05-23
Payer: MEDICARE

## 2024-05-23 VITALS
DIASTOLIC BLOOD PRESSURE: 68 MMHG | OXYGEN SATURATION: 99 % | SYSTOLIC BLOOD PRESSURE: 142 MMHG | HEIGHT: 65 IN | WEIGHT: 150 LBS | TEMPERATURE: 98 F | RESPIRATION RATE: 18 BRPM | HEART RATE: 66 BPM | BODY MASS INDEX: 24.99 KG/M2

## 2024-05-23 DIAGNOSIS — R18.8 OTHER ASCITES: Primary | ICD-10-CM

## 2024-05-23 DIAGNOSIS — K76.9 LIVER DISEASE: ICD-10-CM

## 2024-05-23 DIAGNOSIS — Z99.2 ESRD (END STAGE RENAL DISEASE) ON DIALYSIS (HCC): ICD-10-CM

## 2024-05-23 DIAGNOSIS — N18.6 ESRD (END STAGE RENAL DISEASE) ON DIALYSIS (HCC): ICD-10-CM

## 2024-05-23 LAB
ALBUMIN SERPL-MCNC: 3.8 G/DL (ref 3.4–5)
ALBUMIN/GLOB SERPL: 0.8 (ref 0.8–1.7)
ALP SERPL-CCNC: 154 U/L (ref 45–117)
ALT SERPL-CCNC: 12 U/L (ref 16–61)
ANION GAP SERPL CALC-SCNC: 8 MMOL/L (ref 3–18)
APTT PPP: 39.1 SEC (ref 23–36.4)
AST SERPL-CCNC: 18 U/L (ref 10–38)
BASOPHILS # BLD: 0 K/UL (ref 0–0.1)
BASOPHILS NFR BLD: 1 % (ref 0–2)
BILIRUB SERPL-MCNC: 0.9 MG/DL (ref 0.2–1)
BUN SERPL-MCNC: 28 MG/DL (ref 7–18)
BUN/CREAT SERPL: 5 (ref 12–20)
CALCIUM SERPL-MCNC: 9.2 MG/DL (ref 8.5–10.1)
CHLORIDE SERPL-SCNC: 102 MMOL/L (ref 100–111)
CO2 SERPL-SCNC: 29 MMOL/L (ref 21–32)
CREAT SERPL-MCNC: 5.72 MG/DL (ref 0.6–1.3)
DIFFERENTIAL METHOD BLD: ABNORMAL
EOSINOPHIL # BLD: 0.1 K/UL (ref 0–0.4)
EOSINOPHIL NFR BLD: 3 % (ref 0–5)
ERYTHROCYTE [DISTWIDTH] IN BLOOD BY AUTOMATED COUNT: 16.6 % (ref 11.6–14.5)
GLOBULIN SER CALC-MCNC: 4.6 G/DL (ref 2–4)
GLUCOSE SERPL-MCNC: 101 MG/DL (ref 74–99)
HCT VFR BLD AUTO: 34.9 % (ref 36–48)
HGB BLD-MCNC: 11.2 G/DL (ref 13–16)
IMM GRANULOCYTES # BLD AUTO: 0 K/UL (ref 0–0.04)
IMM GRANULOCYTES NFR BLD AUTO: 0 % (ref 0–0.5)
INR PPP: 1.4 (ref 0.9–1.1)
LYMPHOCYTES # BLD: 0.6 K/UL (ref 0.9–3.6)
LYMPHOCYTES NFR BLD: 16 % (ref 21–52)
MCH RBC QN AUTO: 23.9 PG (ref 24–34)
MCHC RBC AUTO-ENTMCNC: 32.1 G/DL (ref 31–37)
MCV RBC AUTO: 74.4 FL (ref 78–100)
MONOCYTES # BLD: 0.5 K/UL (ref 0.05–1.2)
MONOCYTES NFR BLD: 13 % (ref 3–10)
NEUTS SEG # BLD: 2.4 K/UL (ref 1.8–8)
NEUTS SEG NFR BLD: 67 % (ref 40–73)
NRBC # BLD: 0 K/UL (ref 0–0.01)
NRBC BLD-RTO: 0 PER 100 WBC
PLATELET # BLD AUTO: 120 K/UL (ref 135–420)
PMV BLD AUTO: 10.8 FL (ref 9.2–11.8)
POTASSIUM SERPL-SCNC: 3.9 MMOL/L (ref 3.5–5.5)
PROT SERPL-MCNC: 8.4 G/DL (ref 6.4–8.2)
PROTHROMBIN TIME: 16.8 SEC (ref 11.9–14.7)
RBC # BLD AUTO: 4.69 M/UL (ref 4.35–5.65)
SODIUM SERPL-SCNC: 139 MMOL/L (ref 136–145)
WBC # BLD AUTO: 3.6 K/UL (ref 4.6–13.2)

## 2024-05-23 PROCEDURE — 80053 COMPREHEN METABOLIC PANEL: CPT

## 2024-05-23 PROCEDURE — 96365 THER/PROPH/DIAG IV INF INIT: CPT

## 2024-05-23 PROCEDURE — 6360000002 HC RX W HCPCS: Performed by: EMERGENCY MEDICINE

## 2024-05-23 PROCEDURE — 85610 PROTHROMBIN TIME: CPT

## 2024-05-23 PROCEDURE — 93005 ELECTROCARDIOGRAM TRACING: CPT | Performed by: EMERGENCY MEDICINE

## 2024-05-23 PROCEDURE — 99284 EMERGENCY DEPT VISIT MOD MDM: CPT

## 2024-05-23 PROCEDURE — 74176 CT ABD & PELVIS W/O CONTRAST: CPT

## 2024-05-23 PROCEDURE — C1729 CATH, DRAINAGE: HCPCS

## 2024-05-23 PROCEDURE — 85025 COMPLETE CBC W/AUTO DIFF WBC: CPT

## 2024-05-23 PROCEDURE — 85730 THROMBOPLASTIN TIME PARTIAL: CPT

## 2024-05-23 PROCEDURE — 2500000003 HC RX 250 WO HCPCS: Performed by: EMERGENCY MEDICINE

## 2024-05-23 PROCEDURE — P9047 ALBUMIN (HUMAN), 25%, 50ML: HCPCS | Performed by: EMERGENCY MEDICINE

## 2024-05-23 RX ORDER — LIDOCAINE HYDROCHLORIDE 10 MG/ML
10 INJECTION, SOLUTION EPIDURAL; INFILTRATION; INTRACAUDAL; PERINEURAL ONCE
Status: COMPLETED | OUTPATIENT
Start: 2024-05-23 | End: 2024-05-23

## 2024-05-23 RX ORDER — LIDOCAINE HYDROCHLORIDE 10 MG/ML
10 INJECTION, SOLUTION EPIDURAL; INFILTRATION; INTRACAUDAL; PERINEURAL ONCE
Status: DISCONTINUED | OUTPATIENT
Start: 2024-05-23 | End: 2024-05-27 | Stop reason: HOSPADM

## 2024-05-23 RX ORDER — ALBUMIN (HUMAN) 12.5 G/50ML
25 SOLUTION INTRAVENOUS ONCE
Status: COMPLETED | OUTPATIENT
Start: 2024-05-23 | End: 2024-05-23

## 2024-05-23 RX ADMIN — LIDOCAINE HYDROCHLORIDE ANHYDROUS 10 ML: 10 INJECTION, SOLUTION INFILTRATION at 11:53

## 2024-05-23 RX ADMIN — ALBUMIN (HUMAN) 25 G: 0.25 INJECTION, SOLUTION INTRAVENOUS at 11:03

## 2024-05-23 NOTE — ED PROVIDER NOTES
EMERGENCY DEPARTMENT HISTORY AND PHYSICAL EXAM    Date: 5/23/2024  Patient Name: Frankie Kauffman    History of Presenting Illness     Chief Complaint   Patient presents with    Shortness of Breath     Pt c/o abdominal distention for a couple of months making it difficult to breath. Pt states \"I need my stomach tapped. I tried to see the Doctor, but he's taking too long\".          History Provided By: Patient    Additional History (Context):   10:08 AM EDT  Frankie Kauffman is a 58 y.o. male with PMHX of heart disease, hepatitis C with liver disease, high cholesterol, hypertension, end-stage renal disease dialysis 3 days a week Monday Wednesday Friday last treatment was yesterday, who presents to the emergency department C/O abdominal distention.  He is here asking to have his abdominal region tap for ascites.  He has history of liver disease as well as end-stage renal disease gets dialysis 3 days a week.  He does move his bowels but usually if he is taking his medications more appropriately.  He does not make urine except perhaps a little trickle twice a week.  He had an ultrasound performed at [facility] May 15 showed a small amount of ascites but physical exam does not reflect this.  Will perform dry CT given renal failure and test for volume of ascites.  Will also call radiology staff asked them to review this considering study    Social History  No smoking drinking or drugs    Family History  Father with heart disease.    PCP: José Miguel Stafford MD    No current facility-administered medications for this encounter.     Current Outpatient Medications   Medication Sig Dispense Refill    sevelamer (RENVELA) 800 MG tablet TAKE 2 TABLETS BY MOUTH THREE TIMES A DAY WITH MEALS AND TAKE 1 TABLET TWICE A DAY WITH SNACKS.      albuterol sulfate HFA (PROVENTIL;VENTOLIN;PROAIR) 108 (90 Base) MCG/ACT inhaler INHALE 2 PUFFS BY MOUTH EVERY 6 HOURS AS NEEDED FOR WHEEZING (Patient not taking: Reported on 1/16/2024)       Financial abuse: Denies     Sexual abuse: Denies   Utilities: Not on file         Procedures:  Procedures    ED Course:   10:08 AM EDT: Initial assessment performed. The patients presenting problems have been discussed, and they are in agreement with the care plan formulated and outlined with them.  I have encouraged them to ask questions as they arise throughout their visit.    CONSULT NOTE:   10:13 AM  Florentino Poole MD   spoke with physician assistant Rebeca Laird  Specialty: interventional radiology  Discussed pt's hx, disposition, and available diagnostic and imaging results  over the telephone. Reviewed care plans. Consulting physician agrees with plans as outlined.  She can perform the procedure here.    2:35 PM.    Procedure completed.  Over 4 L of fluid removed from the abdomen.  Patient now appropriate for discharge.  Tolerated procedure well.       Diagnosis and Disposition       DISCHARGE NOTE:  3:01 PM  Frankie Kauffman's  results have been reviewed with him.  He has been counseled regarding his diagnosis, treatment, and plan.  He verbally conveys understanding and agreement of the signs, symptoms, diagnosis, treatment and prognosis and additionally agrees to follow up as discussed.  He also agrees with the care-plan and conveys that all of his questions have been answered.  I have also provided discharge instructions for him that include: educational information regarding their diagnosis and treatment, and list of reasons why they would want to return to the ED prior to their follow-up appointment, should his condition change. He has been provided with education for proper emergency department utilization.     CLINICAL IMPRESSION:    1. Other ascites    2. Liver disease    3. ESRD (end stage renal disease) on dialysis (HCC)        PLAN:  1. D/C Home  2.      Medication List        ASK your doctor about these medications      albuterol sulfate  (90 Base) MCG/ACT inhaler  Commonly known as:

## 2024-05-23 NOTE — CONSULTS
Interventional Radiology Brief Postoperative Note    Procedure Date:  5/23/2024    Procedure:  Ultrasound Guided Paracentesis    :  Rebeca Laird NP    Attending:  Candis Simpson MD    Preoperative Diagnosis:  Ascites    Postoperative Diagnosis:  Ascites    Complications:  None immediate    Estimated Blood Loss:  < 5 mL    Procedure Findings:  Technically successful ultrasound guided RLQ paracentesis with evacuation of 4,200 mL of clear, yellow fluid.    Specimens:  None ordered    Condition:  The patient tolerated the procedure without difficulty and remained in stable condition throughout.    Patient receiving IV Albumin intraprocedure. No additional IV Albumin supplementation required per protocol.    Rebeca Laird, APRN - CNP  [facility]

## 2024-05-23 NOTE — ED NOTES
Medication given per MAR order. Education regarding medication provided.    Tolerated well with no complaints.     Instructed patient to utilize the call light if he/she needs anything further.

## 2024-05-23 NOTE — ED TRIAGE NOTES
Pt c/o abdominal distention for a couple of months making it difficult to breath. Pt states \"I need my stomach tapped. I tried to see the Doctor, but he's taking too long\".

## 2024-05-23 NOTE — PROCEDURES
PROCEDURE NOTE  Date: 5/23/2024   Name: Frankie Kauffman  YOB: 1965    Procedures    Ultrasound Right Lower Quad Paracentesis was preformed.  4.3 Liters was drained.,  Patient tolerated procedure with out pain.  Patient was handed off to transportation in stable condition.

## 2024-05-24 LAB
EKG ATRIAL RATE: 59 BPM
EKG DIAGNOSIS: NORMAL
EKG P AXIS: 37 DEGREES
EKG P-R INTERVAL: 166 MS
EKG Q-T INTERVAL: 448 MS
EKG QRS DURATION: 86 MS
EKG QTC CALCULATION (BAZETT): 443 MS
EKG R AXIS: 75 DEGREES
EKG T AXIS: -84 DEGREES
EKG VENTRICULAR RATE: 59 BPM

## 2024-07-08 RX ORDER — ACETAMINOPHEN 500 MG
500 TABLET ORAL EVERY 6 HOURS PRN
COMMUNITY
Start: 2023-09-18

## 2024-07-09 ENCOUNTER — OFFICE VISIT (OUTPATIENT)
Age: 59
End: 2024-07-09
Payer: MEDICARE

## 2024-07-09 VITALS
OXYGEN SATURATION: 98 % | HEART RATE: 57 BPM | HEIGHT: 65 IN | WEIGHT: 149.4 LBS | BODY MASS INDEX: 24.89 KG/M2 | TEMPERATURE: 98.4 F | DIASTOLIC BLOOD PRESSURE: 3 MMHG | SYSTOLIC BLOOD PRESSURE: 137 MMHG

## 2024-07-09 DIAGNOSIS — K76.1 CHRONIC PASSIVE HEPATIC CONGESTION: Primary | ICD-10-CM

## 2024-07-09 PROBLEM — K74.60 CIRRHOSIS (HCC): Status: ACTIVE | Noted: 2023-01-17

## 2024-07-09 PROCEDURE — G8420 CALC BMI NORM PARAMETERS: HCPCS | Performed by: NURSE PRACTITIONER

## 2024-07-09 PROCEDURE — 3078F DIAST BP <80 MM HG: CPT | Performed by: NURSE PRACTITIONER

## 2024-07-09 PROCEDURE — 1036F TOBACCO NON-USER: CPT | Performed by: NURSE PRACTITIONER

## 2024-07-09 PROCEDURE — 99215 OFFICE O/P EST HI 40 MIN: CPT | Performed by: NURSE PRACTITIONER

## 2024-07-09 PROCEDURE — 3017F COLORECTAL CA SCREEN DOC REV: CPT | Performed by: NURSE PRACTITIONER

## 2024-07-09 PROCEDURE — G8428 CUR MEDS NOT DOCUMENT: HCPCS | Performed by: NURSE PRACTITIONER

## 2024-07-09 PROCEDURE — 3075F SYST BP GE 130 - 139MM HG: CPT | Performed by: NURSE PRACTITIONER

## 2024-07-09 RX ORDER — ROSUVASTATIN CALCIUM 5 MG/1
5 TABLET, COATED ORAL DAILY
COMMUNITY
Start: 2024-06-27

## 2024-07-09 NOTE — PROGRESS NOTES
[facility] LIVER [facility]      Dwight Palacios MD, FACP, FACG, FAASLD      SARINA Byrnes-C    Joana Spain, [facility]   Dejah Solomonciprianojessica, [facility]   Alexa Zan, [facility]-  Efe Amin, [facility]   [place]ece [place], [facility]   Raven Price, [facility]      Liver [facility]   [address], [address]   [place]  [address] [phone]   FAX: [phone]  [facility]   [address], [address]   [place]  [address] [phone]   FAX: [phone]       Patient Care Team:  José Miguel Stafford MD as PCP - General (Family Medicine)  Serena Barnhart MD (Nephrology)  Oliverio Ware MD (Cardio Vascular Surgery)      Patient Active Problem List   Diagnosis    H/O acute myocardial infarction    Hepatitis C virus infection cured after antiviral drug therapy    S/P angioplasty with stent    S/P insertion of iliac artery stent    High cholesterol    H/O: CVA (cerebrovascular accident)    Hypoalbuminemia    Hypertension    Acute on chronic renal failure (HCC)    Chest pain, unspecified    CAD (coronary artery disease), native coronary artery    PVD (peripheral vascular disease) with claudication (HCC)    Chronic passive hepatic congestion    Hypothyroidism       Frankie Kauffman returns to the Liver [place] today for education and management of chronic HCV.  The HCV has been treated and cured.    He began antiviral therapy on 10/13/2020 for the hepatitis C.  He was treated with 12 weeks of Epclusa.  He completed the antiviral regime on about 01/04/2021.  The HCV has been eradicated and the patient is cured of the infection.  This is the only time the HCV has ever been treated.       The active problem list, all pertinent past medical history, medications, liver histology, endoscopic studies, radiologic findings and laboratory

## 2024-07-16 ENCOUNTER — HOSPITAL ENCOUNTER (OUTPATIENT)
Facility: HOSPITAL | Age: 59
Discharge: HOME | End: 2024-07-19
Payer: MEDICARE

## 2024-07-16 ENCOUNTER — HOSPITAL ENCOUNTER (OUTPATIENT)
Facility: HOSPITAL | Age: 59
Discharge: HOME | End: 2024-07-16
Attending: RADIOLOGY | Admitting: RADIOLOGY
Payer: MEDICARE

## 2024-07-16 VITALS
SYSTOLIC BLOOD PRESSURE: 124 MMHG | BODY MASS INDEX: 24.66 KG/M2 | RESPIRATION RATE: 20 BRPM | HEART RATE: 55 BPM | OXYGEN SATURATION: 99 % | DIASTOLIC BLOOD PRESSURE: 72 MMHG | WEIGHT: 148 LBS | TEMPERATURE: 97.6 F | HEIGHT: 65 IN

## 2024-07-16 DIAGNOSIS — K76.1 CHRONIC PASSIVE HEPATIC CONGESTION: ICD-10-CM

## 2024-07-16 LAB
APPEARANCE FLD: CLEAR
BASOPHILS # BLD: 0 K/UL (ref 0–0.1)
BASOPHILS NFR BLD: 1 % (ref 0–2)
COLOR FLD: ABNORMAL
DIFFERENTIAL METHOD BLD: ABNORMAL
EOSINOPHIL # BLD: 0.2 K/UL (ref 0–0.4)
EOSINOPHIL NFR BLD: 6 % (ref 0–5)
EOSINOPHIL NFR FLD MANUAL: 0 %
ERYTHROCYTE [DISTWIDTH] IN BLOOD BY AUTOMATED COUNT: 15.8 % (ref 11.6–14.5)
HCT VFR BLD AUTO: 31.1 % (ref 36–48)
HGB BLD-MCNC: 10.2 G/DL (ref 13–16)
IMM GRANULOCYTES # BLD AUTO: 0 K/UL (ref 0–0.04)
IMM GRANULOCYTES NFR BLD AUTO: 0 % (ref 0–0.5)
INR PPP: 1.4 (ref 0.9–1.1)
LYMPHOCYTES # BLD: 0.7 K/UL (ref 0.9–3.6)
LYMPHOCYTES NFR BLD: 22 % (ref 21–52)
LYMPHOCYTES NFR FLD: 14 %
MACROPHAGES NFR FLD: 79 %
MCH RBC QN AUTO: 23.6 PG (ref 24–34)
MCHC RBC AUTO-ENTMCNC: 32.8 G/DL (ref 31–37)
MCV RBC AUTO: 71.8 FL (ref 78–100)
MONOCYTES # BLD: 0.4 K/UL (ref 0.05–1.2)
MONOCYTES NFR BLD: 13 % (ref 3–10)
MONOCYTES NFR FLD: 0 %
NEUTROPHILS NFR FLD: 7 %
NEUTS BAND # FLD: 0 %
NEUTS SEG # BLD: 1.8 K/UL (ref 1.8–8)
NEUTS SEG NFR BLD: 58 % (ref 40–73)
NRBC # BLD: 0 K/UL (ref 0–0.01)
NRBC BLD-RTO: 0 PER 100 WBC
NUC CELL # FLD: 256 /CU MM
PLATELET # BLD AUTO: 115 K/UL (ref 135–420)
PROTHROMBIN TIME: 17.1 SEC (ref 11.9–14.9)
RBC # BLD AUTO: 4.33 M/UL (ref 4.35–5.65)
RBC # FLD: 4000 /CU MM
SPECIMEN SOURCE FLD: ABNORMAL
WBC # BLD AUTO: 3.2 K/UL (ref 4.6–13.2)

## 2024-07-16 PROCEDURE — P9047 ALBUMIN (HUMAN), 25%, 50ML: HCPCS | Performed by: INTERNAL MEDICINE

## 2024-07-16 PROCEDURE — 85025 COMPLETE CBC W/AUTO DIFF WBC: CPT

## 2024-07-16 PROCEDURE — 2500000003 HC RX 250 WO HCPCS: Performed by: RADIOLOGY

## 2024-07-16 PROCEDURE — 85610 PROTHROMBIN TIME: CPT

## 2024-07-16 PROCEDURE — 76705 ECHO EXAM OF ABDOMEN: CPT

## 2024-07-16 PROCEDURE — 89051 BODY FLUID CELL COUNT: CPT

## 2024-07-16 PROCEDURE — 6360000002 HC RX W HCPCS: Performed by: INTERNAL MEDICINE

## 2024-07-16 PROCEDURE — C1729 CATH, DRAINAGE: HCPCS

## 2024-07-16 RX ORDER — ALBUMIN (HUMAN) 12.5 G/50ML
25 SOLUTION INTRAVENOUS ONCE
Status: COMPLETED | OUTPATIENT
Start: 2024-07-16 | End: 2024-07-16

## 2024-07-16 RX ORDER — LIDOCAINE HYDROCHLORIDE 10 MG/ML
10 INJECTION, SOLUTION EPIDURAL; INFILTRATION; INTRACAUDAL; PERINEURAL ONCE
Status: COMPLETED | OUTPATIENT
Start: 2024-07-16 | End: 2024-07-16

## 2024-07-16 RX ORDER — ALBUMIN (HUMAN) 12.5 G/50ML
25 SOLUTION INTRAVENOUS PRN
Status: DISCONTINUED | OUTPATIENT
Start: 2024-07-16 | End: 2024-07-16 | Stop reason: HOSPADM

## 2024-07-16 RX ADMIN — ALBUMIN (HUMAN) 25 G: 0.25 INJECTION, SOLUTION INTRAVENOUS at 10:04

## 2024-07-16 RX ADMIN — LIDOCAINE HYDROCHLORIDE ANHYDROUS 10 ML: 10 INJECTION, SOLUTION INFILTRATION at 11:03

## 2024-07-16 NOTE — PROCEDURES
PROCEDURE NOTE  Date: 7/16/2024   Name: Frankie Kauffman  YOB: 1965    Procedures  Ultrasound Guided Right Lower Quad Paracentesis was preformed.   4.2 Liters of fluid was drained.  14ml was sent to lab for Cell Count and Diff. Patient tolerated procedure with out pain.

## 2024-07-16 NOTE — DISCHARGE INSTRUCTIONS
Learning About Paracentesis  What is paracentesis?  Paracentesis (say \"rdsr-ze-ryw-MIGUEL-ann-marie\") is a procedure that removes fluid from the belly. The buildup of fluid may be caused by infection, inflammation, an injury, or other problems.  Swelling from too much fluid may cause pain or trouble breathing. The doctor will remove the extra fluid with a needle attached to a tube.  Why is paracentesis done?  Paracentesis may be done to:  Find the cause of fluid buildup in the belly.  Diagnose an infection in the peritoneal fluid.  Check for certain types of cancer.  Remove a large amount of fluid that is causing pain or trouble breathing or that is affecting how the kidneys or the intestines (bowel) are working.  How is the procedure done?  You will empty your bladder before the procedure.  Your doctor or nurse will clean the area of your belly where the needle will go in. Then sterile towels will be put around the area.  You may get a shot of numbing medicine in the skin of your belly. Then your doctor will gently insert a needle where the fluid is. Your doctor may attach a tube (catheter) to the site to help collect the fluid.  After the fluid has drained, your doctor will take out the needle or catheter and put a bandage on the site.  How long does a paracentesis take?  The procedure may take from a few minutes to 30 minutes or more.  What happens after the test?  You can do your normal activities after the procedure, unless your doctor tells you not to.  After the procedure, you may have some clear fluid draining from the site, especially if a large amount of fluid was taken out. There will be less drainage in 1 to 2 days. Ask your doctor how much drainage to expect.  A small gauze pad and bandage may be needed. You may need to change the bandage.  If your doctor thinks that testing the fluid can help find out the cause of a problem, your doctor will send it to a lab.  If fluid builds up in your belly again, your

## 2024-07-16 NOTE — PROGRESS NOTES
Pt is all prepped and ready for procedure.      1045 Pt picked up for procedure.    1200 Pt back to care unit. Procedure tolerated well. Large band-aid to right abdomen dry and intact.     1230 Pt discharged home in the care of sister in stable condition.

## 2024-10-15 ENCOUNTER — OFFICE VISIT (OUTPATIENT)
Age: 59
End: 2024-10-15
Payer: MEDICARE

## 2024-10-15 ENCOUNTER — PREP FOR PROCEDURE (OUTPATIENT)
Age: 59
End: 2024-10-15

## 2024-10-15 ENCOUNTER — HOSPITAL ENCOUNTER (OUTPATIENT)
Facility: HOSPITAL | Age: 59
Setting detail: SPECIMEN
Discharge: HOME | End: 2024-10-18

## 2024-10-15 VITALS
HEART RATE: 52 BPM | DIASTOLIC BLOOD PRESSURE: 65 MMHG | HEIGHT: 65 IN | TEMPERATURE: 97.9 F | WEIGHT: 148.2 LBS | OXYGEN SATURATION: 98 % | SYSTOLIC BLOOD PRESSURE: 122 MMHG | BODY MASS INDEX: 24.69 KG/M2

## 2024-10-15 DIAGNOSIS — K76.1 CHRONIC PASSIVE HEPATIC CONGESTION: Primary | ICD-10-CM

## 2024-10-15 DIAGNOSIS — Z86.19 HISTORY OF HEPATITIS B: ICD-10-CM

## 2024-10-15 DIAGNOSIS — K76.1 CHRONIC PASSIVE HEPATIC CONGESTION: ICD-10-CM

## 2024-10-15 LAB — LABCORP SPECIMEN COLLECTION: NORMAL

## 2024-10-15 PROCEDURE — 99214 OFFICE O/P EST MOD 30 MIN: CPT | Performed by: NURSE PRACTITIONER

## 2024-10-15 PROCEDURE — 99001 SPECIMEN HANDLING PT-LAB: CPT

## 2024-10-15 PROCEDURE — 3074F SYST BP LT 130 MM HG: CPT | Performed by: NURSE PRACTITIONER

## 2024-10-15 PROCEDURE — 3078F DIAST BP <80 MM HG: CPT | Performed by: NURSE PRACTITIONER

## 2024-10-15 RX ORDER — IBUPROFEN 800 MG/1
800 TABLET, FILM COATED ORAL EVERY 8 HOURS PRN
COMMUNITY
Start: 2024-10-09

## 2024-10-15 RX ORDER — AMOXICILLIN 500 MG/1
CAPSULE ORAL
COMMUNITY
Start: 2024-10-09 | End: 2024-10-15

## 2024-10-15 RX ORDER — CLINDAMYCIN HCL 300 MG
CAPSULE ORAL
COMMUNITY
Start: 2024-09-09 | End: 2024-10-15

## 2024-10-15 NOTE — PROGRESS NOTES
appearance.  05/2024.  Abdominal ultrasound.   The liver demonstrates diffusely increased echogenicity.  No focal liver lesion.  The liver has a mildly nodular contour.  The main portal vein is patent with hepatopetal flow.  There is mild ascites.     OTHER TESTING:  Not available or performed    FOLLOW-UP:  All of the issues listed above in the Assessment and Plan were discussed with the patient.  All questions were answered.  The patient expressed a clear understanding of the above.    Follow-up [facility] in 3 months.    MANDY Cleaning  [facility]  [address], [address]   [place] [address] [phone]

## 2024-10-16 LAB
ALBUMIN SERPL-MCNC: 4.4 G/DL (ref 3.8–4.9)
ALP SERPL-CCNC: 113 IU/L (ref 44–121)
ALT SERPL-CCNC: 7 IU/L (ref 0–44)
AMMONIA PLAS-MCNC: 29 UG/DL (ref 40–200)
AST SERPL-CCNC: 17 IU/L (ref 0–40)
BASOPHILS # BLD AUTO: 0.1 X10E3/UL (ref 0–0.2)
BASOPHILS NFR BLD AUTO: 1 %
BILIRUB DIRECT SERPL-MCNC: 0.26 MG/DL (ref 0–0.4)
BILIRUB SERPL-MCNC: 0.4 MG/DL (ref 0–1.2)
BUN SERPL-MCNC: 39 MG/DL (ref 8–27)
BUN/CREAT SERPL: 7 (ref 10–24)
CALCIUM SERPL-MCNC: 9.8 MG/DL (ref 8.6–10.2)
CHLORIDE SERPL-SCNC: 94 MMOL/L (ref 96–106)
CO2 SERPL-SCNC: 24 MMOL/L (ref 20–29)
CREAT SERPL-MCNC: 5.66 MG/DL (ref 0.76–1.27)
EGFRCR SERPLBLD CKD-EPI 2021: 11 ML/MIN/1.73
EOSINOPHIL # BLD AUTO: 0.2 X10E3/UL (ref 0–0.4)
EOSINOPHIL NFR BLD AUTO: 3 %
ERYTHROCYTE [DISTWIDTH] IN BLOOD BY AUTOMATED COUNT: 16.1 % (ref 11.6–15.4)
GLUCOSE SERPL-MCNC: 87 MG/DL (ref 70–99)
HCT VFR BLD AUTO: 32.3 % (ref 37.5–51)
HGB BLD-MCNC: 10.1 G/DL (ref 13–17.7)
IMM GRANULOCYTES # BLD AUTO: 0 X10E3/UL (ref 0–0.1)
IMM GRANULOCYTES NFR BLD AUTO: 0 %
INR PPP: 1.2 (ref 0.9–1.2)
LYMPHOCYTES # BLD AUTO: 0.6 X10E3/UL (ref 0.7–3.1)
LYMPHOCYTES NFR BLD AUTO: 12 %
MCH RBC QN AUTO: 24.5 PG (ref 26.6–33)
MCHC RBC AUTO-ENTMCNC: 31.3 G/DL (ref 31.5–35.7)
MCV RBC AUTO: 78 FL (ref 79–97)
MONOCYTES # BLD AUTO: 0.6 X10E3/UL (ref 0.1–0.9)
MONOCYTES NFR BLD AUTO: 13 %
NEUTROPHILS # BLD AUTO: 3.5 X10E3/UL (ref 1.4–7)
NEUTROPHILS NFR BLD AUTO: 71 %
PLATELET # BLD AUTO: 169 X10E3/UL (ref 150–450)
POTASSIUM SERPL-SCNC: 5.1 MMOL/L (ref 3.5–5.2)
PROT SERPL-MCNC: 8.2 G/DL (ref 6–8.5)
PROTHROMBIN TIME: 13.5 SEC (ref 9.1–12)
RBC # BLD AUTO: 4.13 X10E6/UL (ref 4.14–5.8)
SODIUM SERPL-SCNC: 138 MMOL/L (ref 134–144)
WBC # BLD AUTO: 4.9 X10E3/UL (ref 3.4–10.8)

## 2024-10-17 LAB
AFP L3 MFR SERPL: NORMAL % (ref 0–9.9)
AFP SERPL-MCNC: 1 NG/ML (ref 0–8.4)

## 2024-10-22 ENCOUNTER — HOSPITAL ENCOUNTER (OUTPATIENT)
Facility: HOSPITAL | Age: 59
Discharge: HOME | End: 2024-10-22
Attending: INTERNAL MEDICINE | Admitting: INTERNAL MEDICINE
Payer: MEDICARE

## 2024-10-22 VITALS
DIASTOLIC BLOOD PRESSURE: 64 MMHG | OXYGEN SATURATION: 96 % | TEMPERATURE: 97.7 F | RESPIRATION RATE: 20 BRPM | SYSTOLIC BLOOD PRESSURE: 124 MMHG | HEART RATE: 57 BPM | WEIGHT: 148.8 LBS | BODY MASS INDEX: 24.79 KG/M2 | HEIGHT: 65 IN

## 2024-10-22 DIAGNOSIS — K76.1 CHRONIC PASSIVE HEPATIC CONGESTION: ICD-10-CM

## 2024-10-22 LAB
APPEARANCE FLD: CLEAR
COLOR FLD: ABNORMAL
EOSINOPHIL NFR FLD MANUAL: 0 %
LYMPHOCYTES NFR FLD: 18 %
MONOCYTES NFR FLD: 80 %
NEUTROPHILS NFR FLD: 2 %
NEUTS BAND # FLD: 0 %
NUC CELL # FLD: 297 /CU MM
RBC # FLD: 3000 /CU MM
SPECIMEN SOURCE FLD: ABNORMAL
WBC MORPH BLD: ABNORMAL

## 2024-10-22 PROCEDURE — 89051 BODY FLUID CELL COUNT: CPT

## 2024-10-22 PROCEDURE — 2500000003 HC RX 250 WO HCPCS

## 2024-10-22 PROCEDURE — 6360000002 HC RX W HCPCS: Performed by: NURSE PRACTITIONER

## 2024-10-22 PROCEDURE — 2709999900 US GUIDED PARACENTESIS

## 2024-10-22 PROCEDURE — P9047 ALBUMIN (HUMAN), 25%, 50ML: HCPCS | Performed by: NURSE PRACTITIONER

## 2024-10-22 RX ORDER — ALBUMIN (HUMAN) 12.5 G/50ML
25 SOLUTION INTRAVENOUS AS NEEDED
Status: DISCONTINUED | OUTPATIENT
Start: 2024-10-22 | End: 2024-10-22 | Stop reason: HOSPADM

## 2024-10-22 RX ORDER — ALBUMIN (HUMAN) 12.5 G/50ML
25 SOLUTION INTRAVENOUS ONCE
Status: COMPLETED | OUTPATIENT
Start: 2024-10-22 | End: 2024-10-22

## 2024-10-22 RX ORDER — LIDOCAINE HYDROCHLORIDE 10 MG/ML
10 INJECTION, SOLUTION INFILTRATION; PERINEURAL ONCE
Status: COMPLETED | OUTPATIENT
Start: 2024-10-22 | End: 2024-10-22

## 2024-10-22 RX ADMIN — LIDOCAINE HYDROCHLORIDE ANHYDROUS 7 ML: 10 INJECTION, SOLUTION INFILTRATION at 12:38

## 2024-10-22 RX ADMIN — ALBUMIN (HUMAN) 25 G: 0.25 INJECTION, SOLUTION INTRAVENOUS at 11:54

## 2024-10-22 NOTE — PROGRESS NOTES
Pt is all prepped and ready for procedure.    1315 Pt back to care unit. Procedure tolerated well. Large band-aid michelle right abdomen dry and intact.     1345 Pt discharged home in the care of sister in stable condition.

## 2024-10-22 NOTE — PROGRESS NOTES
Ultrasound guided RLQ paracentesis was performed.  Pt tolerated procedure well, no signs of distress.  14 mL of ascites fluid was collected and sent to lab for cell count and diff.  3,800 mL of fluid drained.  Pt returned to Care Unit in stable condition.

## 2024-10-22 NOTE — DISCHARGE INSTRUCTIONS
Learning About Paracentesis  What is paracentesis?  Paracentesis (say \"wagx-mw-gwo-MIGUEL-ann-marie\") is a procedure that removes fluid from the belly. The buildup of fluid may be caused by infection, inflammation, an injury, or other problems.  Swelling from too much fluid may cause pain or trouble breathing. The doctor will remove the extra fluid with a needle attached to a tube.  Why is paracentesis done?  Paracentesis may be done to:  Find the cause of fluid buildup in the belly.  Diagnose an infection in the peritoneal fluid.  Check for certain types of cancer.  Remove a large amount of fluid that is causing pain or trouble breathing or that is affecting how the kidneys or the intestines (bowel) are working.  How is the procedure done?  You will empty your bladder before the procedure.  Your doctor or nurse will clean the area of your belly where the needle will go in. Then sterile towels will be put around the area.  You may get a shot of numbing medicine in the skin of your belly. Then your doctor will gently insert a needle where the fluid is. Your doctor may attach a tube (catheter) to the site to help collect the fluid.  After the fluid has drained, your doctor will take out the needle or catheter and put a bandage on the site.  How long does a paracentesis take?  The procedure may take from a few minutes to 30 minutes or more.  What happens after the test?  You can do your normal activities after the procedure, unless your doctor tells you not to.  After the procedure, you may have some clear fluid draining from the site, especially if a large amount of fluid was taken out. There will be less drainage in 1 to 2 days. Ask your doctor how much drainage to expect.  A small gauze pad and bandage may be needed. You may need to change the bandage.  If your doctor thinks that testing the fluid can help find out the cause of a problem, your doctor will send it to a lab.  If fluid builds up in your belly again, your

## 2024-12-05 ENCOUNTER — HOSPITAL ENCOUNTER (EMERGENCY)
Facility: HOSPITAL | Age: 59
Discharge: HOME | End: 2024-12-06
Attending: EMERGENCY MEDICINE
Payer: MEDICARE

## 2024-12-05 VITALS
TEMPERATURE: 98.4 F | WEIGHT: 150 LBS | HEART RATE: 61 BPM | BODY MASS INDEX: 24.99 KG/M2 | RESPIRATION RATE: 17 BRPM | SYSTOLIC BLOOD PRESSURE: 147 MMHG | OXYGEN SATURATION: 98 % | DIASTOLIC BLOOD PRESSURE: 77 MMHG | HEIGHT: 65 IN

## 2024-12-05 DIAGNOSIS — T82.838A HEMORRHAGE OF ARTERIOVENOUS FISTULA, INITIAL ENCOUNTER (HCC): Primary | ICD-10-CM

## 2024-12-05 PROCEDURE — 99283 EMERGENCY DEPT VISIT LOW MDM: CPT

## 2024-12-05 ASSESSMENT — PAIN DESCRIPTION - LOCATION: LOCATION: ARM

## 2024-12-05 ASSESSMENT — PAIN - FUNCTIONAL ASSESSMENT: PAIN_FUNCTIONAL_ASSESSMENT: 0-10

## 2024-12-05 ASSESSMENT — PAIN DESCRIPTION - ORIENTATION: ORIENTATION: LEFT

## 2024-12-05 ASSESSMENT — PAIN SCALES - GENERAL: PAINLEVEL_OUTOF10: 7

## 2024-12-06 PROCEDURE — 6370000000 HC RX 637 (ALT 250 FOR IP): Performed by: EMERGENCY MEDICINE

## 2024-12-06 RX ORDER — DESMOPRESSIN ACETATE 0.1 MG/1
100 TABLET ORAL ONCE
Status: COMPLETED | OUTPATIENT
Start: 2024-12-06 | End: 2024-12-06

## 2024-12-06 RX ORDER — HYDROCODONE BITARTRATE AND ACETAMINOPHEN 5; 325 MG/1; MG/1
1 TABLET ORAL
Status: COMPLETED | OUTPATIENT
Start: 2024-12-06 | End: 2024-12-06

## 2024-12-06 RX ORDER — DESMOPRESSIN ACETATE 0.1 MG/1
100 TABLET ORAL NIGHTLY
Status: DISCONTINUED | OUTPATIENT
Start: 2024-12-06 | End: 2024-12-06

## 2024-12-06 RX ADMIN — DESMOPRESSIN ACETATE 100 MCG: 0.1 TABLET ORAL at 00:50

## 2024-12-06 RX ADMIN — HYDROCODONE BITARTRATE AND ACETAMINOPHEN 1 TABLET: 5; 325 TABLET ORAL at 00:50

## 2024-12-06 NOTE — ED TRIAGE NOTES
Pt arrives ambulatory to triage c/o fistula bleeding. Patient states bleeding started this morning around 0600. Pt states he has not been able to control bleeding. Pt states he had a syncopal episode around 1830 while ironing clothes. Pt denies any injury or trauma from the fall.

## 2024-12-06 NOTE — ED PROVIDER NOTES
EMERGENCY DEPARTMENT HISTORY AND PHYSICAL EXAM    Date: 12/5/2024  Patient Name: Frankie Kauffman    History of Presenting Illness     Chief Complaint   Patient presents with    Fistula Bleeding     History Provided By: Patient    Additional History (Context):   11:08 PM EST  Frankie Kauffman is a 59 y.o. male with PMHX of hepatitis B, end-stage renal disease with 3 times weekly at dialysis, high cholesterol who presents to the emergency department C/O bleeding from his fistula.  Patient woke this morning with punctate bleeding after taking place from his fistula site.  It started at 6 AM this morning and has been off and on throughout the day.  Blood pressure but otherwise feels well.  He is now awake alert oriented states if he takes the bandage off the bleeding will stop reassured.    Family History:   Diabetes Mother   Hypertension Mother   Stroke Mother   Heart attack Father   Hypertension Father   Prostate cancer Paternal Grandfather   Dusty nunez  Social History:   Smoking status: Former   Current packs/day: 0.00   Average packs/day: 1 pack/day for 47.0 years (47.0 ttl pk-yrs)   Types: Cigarettes   Start date: 1975   Quit date: 2022   Denies alcohol or drug use      PCP: José Miguel Stafford MD    Current Facility-Administered Medications   Medication Dose Route Frequency Provider Last Rate Last Admin    HYDROcodone-acetaminophen (NORCO) 5-325 MG per tablet 1 tablet  1 tablet Oral NOW Florentino Poole MD        DESMOpressin (DDAVP) tablet 100 mcg  100 mcg Oral Once Florentino Poole MD         Current Outpatient Medications   Medication Sig Dispense Refill    ibuprofen (ADVIL;MOTRIN) 800 MG tablet Take 1 tablet by mouth every 8 hours as needed for Pain (Patient not taking: Reported on 10/22/2024)      rosuvastatin (CRESTOR) 5 MG tablet Take 1 tablet by mouth daily      acetaminophen (TYLENOL) 500 MG tablet Take 1 tablet by mouth every 6 hours as needed      sevelamer (RENVELA) 800 MG tablet TAKE 2 TABLETS BY

## 2024-12-19 ENCOUNTER — HOSPITAL ENCOUNTER (OUTPATIENT)
Facility: HOSPITAL | Age: 59
Discharge: HOME | End: 2024-12-19
Attending: INTERNAL MEDICINE | Admitting: INTERNAL MEDICINE
Payer: MEDICARE

## 2024-12-19 VITALS
SYSTOLIC BLOOD PRESSURE: 124 MMHG | TEMPERATURE: 98.3 F | HEIGHT: 65 IN | OXYGEN SATURATION: 95 % | WEIGHT: 148.4 LBS | HEART RATE: 56 BPM | DIASTOLIC BLOOD PRESSURE: 63 MMHG | RESPIRATION RATE: 16 BRPM | BODY MASS INDEX: 24.72 KG/M2

## 2024-12-19 DIAGNOSIS — K76.1 CHRONIC PASSIVE HEPATIC CONGESTION: ICD-10-CM

## 2024-12-19 LAB
APPEARANCE FLD: ABNORMAL
COLOR FLD: ABNORMAL
EOSINOPHIL NFR FLD MANUAL: 0 %
ERYTHROCYTE [DISTWIDTH] IN BLOOD BY AUTOMATED COUNT: 16.3 % (ref 11.6–14.5)
HCT VFR BLD AUTO: 32.5 % (ref 36–48)
HGB BLD-MCNC: 10.4 G/DL (ref 13–16)
INR PPP: 1.3 (ref 0.9–1.1)
LYMPHOCYTES NFR FLD: 33 %
MACROPHAGES NFR FLD: 67 %
MCH RBC QN AUTO: 24.6 PG (ref 24–34)
MCHC RBC AUTO-ENTMCNC: 32 G/DL (ref 31–37)
MCV RBC AUTO: 77 FL (ref 78–100)
MONOCYTES NFR FLD: 0 %
NEUTROPHILS NFR FLD: 0 %
NEUTS BAND # FLD: 0 %
NRBC # BLD: 0 K/UL (ref 0–0.01)
NRBC BLD-RTO: 0 PER 100 WBC
NUC CELL # FLD: 335 /CU MM
PLATELET # BLD AUTO: 134 K/UL (ref 135–420)
PMV BLD AUTO: 10.4 FL (ref 9.2–11.8)
PROTHROMBIN TIME: 16.8 SEC (ref 11.9–14.9)
RBC # BLD AUTO: 4.22 M/UL (ref 4.35–5.65)
RBC # FLD: 5000 /CU MM
SPECIMEN SOURCE FLD: ABNORMAL
WBC # BLD AUTO: 3.6 K/UL (ref 4.6–13.2)

## 2024-12-19 PROCEDURE — 6360000002 HC RX W HCPCS: Performed by: INTERNAL MEDICINE

## 2024-12-19 PROCEDURE — P9047 ALBUMIN (HUMAN), 25%, 50ML: HCPCS | Performed by: NURSE PRACTITIONER

## 2024-12-19 PROCEDURE — 85610 PROTHROMBIN TIME: CPT

## 2024-12-19 PROCEDURE — 85027 COMPLETE CBC AUTOMATED: CPT

## 2024-12-19 PROCEDURE — 49083 ABD PARACENTESIS W/IMAGING: CPT

## 2024-12-19 PROCEDURE — 6360000002 HC RX W HCPCS: Performed by: NURSE PRACTITIONER

## 2024-12-19 PROCEDURE — 89051 BODY FLUID CELL COUNT: CPT

## 2024-12-19 RX ORDER — LIDOCAINE HYDROCHLORIDE 10 MG/ML
10 INJECTION, SOLUTION EPIDURAL; INFILTRATION; INTRACAUDAL; PERINEURAL ONCE
Status: COMPLETED | OUTPATIENT
Start: 2024-12-19 | End: 2024-12-19

## 2024-12-19 RX ORDER — ALBUMIN (HUMAN) 12.5 G/50ML
25 SOLUTION INTRAVENOUS ONCE
Status: COMPLETED | OUTPATIENT
Start: 2024-12-19 | End: 2024-12-19

## 2024-12-19 RX ORDER — ALBUMIN (HUMAN) 12.5 G/50ML
25 SOLUTION INTRAVENOUS AS NEEDED
Status: DISCONTINUED | OUTPATIENT
Start: 2024-12-19 | End: 2024-12-19 | Stop reason: HOSPADM

## 2024-12-19 RX ORDER — ALBUMIN (HUMAN) 12.5 G/50ML
SOLUTION INTRAVENOUS
Status: DISCONTINUED
Start: 2024-12-19 | End: 2024-12-19 | Stop reason: HOSPADM

## 2024-12-19 RX ADMIN — LIDOCAINE HYDROCHLORIDE ANHYDROUS 10 ML: 10 INJECTION, SOLUTION INFILTRATION at 10:17

## 2024-12-19 RX ADMIN — ALBUMIN (HUMAN) 25 G: 0.25 INJECTION, SOLUTION INTRAVENOUS at 09:09

## 2024-12-19 NOTE — PROGRESS NOTES
Pt. Is all prepped and ready for procedure.  0915 Pt. To U/S via stretcher.  1015 Pt. Returned from U/S , awake and alert. No c/o pain. Band aid dry and intact right lower abdomen.   1030 Pt. D/c'd in c/o self via w/c to home. No c/o pain. Band aid dry and intact right lower abdomen. Pt. In stable condition.

## 2024-12-19 NOTE — PROCEDURES
PROCEDURE NOTE  Date: 12/19/2024   Name: Frankie Kauffman  YOB: 1965    Procedures Ultrasound Guided Right Lower Quad Paracentesis was preformed. 3.8 Liters was drained.  14ml fluid sent to lab.  Patient tolerated procedure with out pain.  Patient was handed off to transportation in stable condition.

## 2024-12-19 NOTE — DISCHARGE INSTRUCTIONS
PARACENTESIS DISCHARGE INSTRUCTIONS    Activity  No strenuous activity for 12 hours after your procedure.  No heavy lifting for 12 hours after your procedure.  No driving for 12 hours after your procedure.  Move slowly when changing your position to avoid becoming lightheaded or dizzy.  Continue a low salt (sodium) diet.    Care of Puncture Site  Remove the bandage the morning after your procedure.  You may shower the day after your procedure.  Fluid leaking from the puncture site is normal for a few days after your procedure.  You may change the bandage as needed for leaking fluid.  Do not keep bandage in place once the leaking has stopped.  Mild soreness at the puncture site is normal.  You may take medication for pain as prescribed by your doctor.        When to Call Your Doctor     Your doctor is ___________________________  New or increased pain at the puncture site.  Lightheadedness or dizziness when changing position that does not improve or it becomes severe.  If you have fainting please call 911 for emergency help.  Bleeding from the puncture site.  Signs of infection:     -Sudden and severe abdominal pain   -Fever              -Chills              -Redness and warmth around the puncture site              -Yellow or green drainage from the puncture site. It may or may not have a foul odor.    We provide this literature for patients and family members.  It is intended to be an educational supplement that highlights some of the important points of what we have previously discussed.    I/We have been given an opportunity to ask questions.  Patient armband removed and shredded      I/We certify this form has been fully explained to me/us and I/We understand its contents.    Patient/Guardian Signature:________________________Date: __/__/____Time:______    RN Signature:______________________________________Date:__/__/____Time:______

## 2025-01-28 ENCOUNTER — OFFICE VISIT (OUTPATIENT)
Age: 60
End: 2025-01-28
Payer: MEDICARE

## 2025-01-28 ENCOUNTER — HOSPITAL ENCOUNTER (OUTPATIENT)
Facility: HOSPITAL | Age: 60
Setting detail: SPECIMEN
Discharge: HOME | End: 2025-01-31
Payer: MEDICARE

## 2025-01-28 ENCOUNTER — PREP FOR PROCEDURE (OUTPATIENT)
Age: 60
End: 2025-01-28

## 2025-01-28 VITALS
BODY MASS INDEX: 24.16 KG/M2 | WEIGHT: 145 LBS | OXYGEN SATURATION: 97 % | TEMPERATURE: 98.6 F | HEIGHT: 65 IN | SYSTOLIC BLOOD PRESSURE: 130 MMHG | HEART RATE: 51 BPM | DIASTOLIC BLOOD PRESSURE: 69 MMHG

## 2025-01-28 DIAGNOSIS — K76.1 CHRONIC PASSIVE HEPATIC CONGESTION: Primary | ICD-10-CM

## 2025-01-28 DIAGNOSIS — K70.31 ALCOHOLIC CIRRHOSIS OF LIVER WITH ASCITES (HCC): ICD-10-CM

## 2025-01-28 LAB
ALBUMIN SERPL-MCNC: 4 G/DL (ref 3.4–5)
ALBUMIN/GLOB SERPL: 0.8 (ref 0.8–1.7)
ALP SERPL-CCNC: 116 U/L (ref 45–117)
ALT SERPL-CCNC: 12 U/L (ref 16–61)
AMMONIA PLAS-SCNC: 27 UMOL/L (ref 11–32)
ANION GAP SERPL CALC-SCNC: 4 MMOL/L (ref 3–18)
AST SERPL-CCNC: 16 U/L (ref 10–38)
BASOPHILS # BLD: 0.04 K/UL (ref 0–0.1)
BASOPHILS NFR BLD: 0.9 % (ref 0–2)
BILIRUB DIRECT SERPL-MCNC: 0.4 MG/DL (ref 0–0.2)
BILIRUB SERPL-MCNC: 0.8 MG/DL (ref 0.2–1)
BUN SERPL-MCNC: 34 MG/DL (ref 7–18)
BUN/CREAT SERPL: 5 (ref 12–20)
CALCIUM SERPL-MCNC: 9.8 MG/DL (ref 8.5–10.1)
CHLORIDE SERPL-SCNC: 98 MMOL/L (ref 100–111)
CO2 SERPL-SCNC: 34 MMOL/L (ref 21–32)
CREAT SERPL-MCNC: 6.89 MG/DL (ref 0.6–1.3)
DIFFERENTIAL METHOD BLD: ABNORMAL
EOSINOPHIL # BLD: 0.14 K/UL (ref 0–0.4)
EOSINOPHIL NFR BLD: 3.1 % (ref 0–5)
ERYTHROCYTE [DISTWIDTH] IN BLOOD BY AUTOMATED COUNT: 18.1 % (ref 11.6–14.5)
GLOBULIN SER CALC-MCNC: 4.8 G/DL (ref 2–4)
GLUCOSE SERPL-MCNC: 94 MG/DL (ref 74–99)
HCT VFR BLD AUTO: 36.2 % (ref 36–48)
HGB BLD-MCNC: 11.7 G/DL (ref 13–16)
IMM GRANULOCYTES # BLD AUTO: 0.02 K/UL (ref 0–0.04)
IMM GRANULOCYTES NFR BLD AUTO: 0.4 % (ref 0–0.5)
INR PPP: 1.2 (ref 0.9–1.1)
LYMPHOCYTES # BLD: 0.81 K/UL (ref 0.9–3.3)
LYMPHOCYTES NFR BLD: 18.1 % (ref 21–52)
MCH RBC QN AUTO: 24.9 PG (ref 24–34)
MCHC RBC AUTO-ENTMCNC: 32.3 G/DL (ref 31–37)
MCV RBC AUTO: 77 FL (ref 78–100)
MONOCYTES # BLD: 0.49 K/UL (ref 0.05–1.2)
MONOCYTES NFR BLD: 10.9 % (ref 3–10)
NEUTS SEG # BLD: 2.98 K/UL (ref 1.8–8)
NEUTS SEG NFR BLD: 66.6 % (ref 40–73)
NRBC # BLD: 0 K/UL (ref 0–0.01)
NRBC BLD-RTO: 0 PER 100 WBC
PLATELET # BLD AUTO: 128 K/UL (ref 135–420)
POTASSIUM SERPL-SCNC: 4.4 MMOL/L (ref 3.5–5.5)
PROT SERPL-MCNC: 8.8 G/DL (ref 6.4–8.2)
PROTHROMBIN TIME: 15.8 SEC (ref 11.9–14.9)
RBC # BLD AUTO: 4.7 M/UL (ref 4.35–5.65)
SODIUM SERPL-SCNC: 136 MMOL/L (ref 136–145)
WBC # BLD AUTO: 4.5 K/UL (ref 4.6–13.2)

## 2025-01-28 PROCEDURE — 99214 OFFICE O/P EST MOD 30 MIN: CPT | Performed by: NURSE PRACTITIONER

## 2025-01-28 PROCEDURE — 82140 ASSAY OF AMMONIA: CPT

## 2025-01-28 PROCEDURE — 3075F SYST BP GE 130 - 139MM HG: CPT | Performed by: NURSE PRACTITIONER

## 2025-01-28 PROCEDURE — 85610 PROTHROMBIN TIME: CPT

## 2025-01-28 PROCEDURE — 3078F DIAST BP <80 MM HG: CPT | Performed by: NURSE PRACTITIONER

## 2025-01-28 PROCEDURE — 82107 ALPHA-FETOPROTEIN L3: CPT

## 2025-01-28 PROCEDURE — 80048 BASIC METABOLIC PNL TOTAL CA: CPT

## 2025-01-28 PROCEDURE — 85025 COMPLETE CBC W/AUTO DIFF WBC: CPT

## 2025-01-28 PROCEDURE — 36415 COLL VENOUS BLD VENIPUNCTURE: CPT

## 2025-01-28 PROCEDURE — 80076 HEPATIC FUNCTION PANEL: CPT

## 2025-01-28 RX ORDER — AMOXICILLIN 500 MG/1
500 CAPSULE ORAL 3 TIMES DAILY
COMMUNITY
Start: 2024-10-09 | End: 2025-01-28 | Stop reason: ALTCHOICE

## 2025-01-28 RX ORDER — PREDNISONE 20 MG/1
TABLET ORAL
COMMUNITY
Start: 2024-11-23 | End: 2025-01-28

## 2025-01-28 RX ORDER — CHLORHEXIDINE GLUCONATE ORAL RINSE 1.2 MG/ML
SOLUTION DENTAL
COMMUNITY
Start: 2024-10-16

## 2025-01-28 NOTE — PROGRESS NOTES
The liver, gallbladder, spleen, pancreas and adrenal glands are grossly normal.  07/2020.  Ultrasound of the liver.  Diffusely heterogeneous echotexture.  No focal mass.  07/2021.  Abdominal CT.  Liver is normal in unenhanced appearance.  05/2024.  Abdominal ultrasound.   The liver demonstrates diffusely increased echogenicity.  No focal liver lesion.  The liver has a mildly nodular contour.  The main portal vein is patent with hepatopetal flow.  There is mild ascites.   07/2024.  Ultrasound of the liver.  Echogenicity is heterogeneous. No focal liver lesion.     OTHER TESTING:  Not available or performed    FOLLOW-UP:  All of the issues listed above in the Assessment and Plan were discussed with the patient.  All questions were answered.  The patient expressed a clear understanding of the above.    Follow-up [facility] in 3 months.    VERO Cleaning-CALVIN  [facility]  [address], [address]   [place] [address] [phone]

## 2025-01-31 RX ORDER — ALBUMIN (HUMAN) 12.5 G/50ML
25 SOLUTION INTRAVENOUS
Status: CANCELLED | OUTPATIENT
Start: 2025-01-31

## 2025-01-31 RX ORDER — ALBUMIN (HUMAN) 12.5 G/50ML
25 SOLUTION INTRAVENOUS ONCE
Status: CANCELLED | OUTPATIENT
Start: 2025-01-31 | End: 2025-01-31

## 2025-02-08 LAB
AFP L3 MFR SERPL: NORMAL % (ref 0–9.9)
AFP SERPL-MCNC: 1.2 NG/ML (ref 0–8.4)

## 2025-02-10 ENCOUNTER — ANESTHESIA (OUTPATIENT)
Facility: HOSPITAL | Age: 60
End: 2025-02-10
Payer: MEDICARE

## 2025-02-10 ENCOUNTER — HOSPITAL ENCOUNTER (OUTPATIENT)
Facility: HOSPITAL | Age: 60
Setting detail: OUTPATIENT SURGERY
Discharge: HOME | End: 2025-02-10
Attending: INTERNAL MEDICINE | Admitting: INTERNAL MEDICINE
Payer: MEDICARE

## 2025-02-10 ENCOUNTER — ANESTHESIA EVENT (OUTPATIENT)
Facility: HOSPITAL | Age: 60
End: 2025-02-10
Payer: MEDICARE

## 2025-02-10 VITALS
SYSTOLIC BLOOD PRESSURE: 119 MMHG | HEIGHT: 65 IN | DIASTOLIC BLOOD PRESSURE: 66 MMHG | OXYGEN SATURATION: 100 % | RESPIRATION RATE: 15 BRPM | HEART RATE: 62 BPM | TEMPERATURE: 98 F | WEIGHT: 153.1 LBS | BODY MASS INDEX: 25.51 KG/M2

## 2025-02-10 PROCEDURE — 3700000000 HC ANESTHESIA ATTENDED CARE: Performed by: INTERNAL MEDICINE

## 2025-02-10 PROCEDURE — 2709999900 HC NON-CHARGEABLE SUPPLY: Performed by: INTERNAL MEDICINE

## 2025-02-10 PROCEDURE — 3700000001 HC ADD 15 MINUTES (ANESTHESIA): Performed by: INTERNAL MEDICINE

## 2025-02-10 PROCEDURE — 7100000010 HC PHASE II RECOVERY - FIRST 15 MIN: Performed by: INTERNAL MEDICINE

## 2025-02-10 PROCEDURE — 43235 EGD DIAGNOSTIC BRUSH WASH: CPT | Performed by: INTERNAL MEDICINE

## 2025-02-10 PROCEDURE — 6360000002 HC RX W HCPCS: Performed by: NURSE ANESTHETIST, CERTIFIED REGISTERED

## 2025-02-10 PROCEDURE — 3600007512: Performed by: INTERNAL MEDICINE

## 2025-02-10 PROCEDURE — 3600007502: Performed by: INTERNAL MEDICINE

## 2025-02-10 PROCEDURE — 2580000003 HC RX 258: Performed by: INTERNAL MEDICINE

## 2025-02-10 PROCEDURE — 7100000011 HC PHASE II RECOVERY - ADDTL 15 MIN: Performed by: INTERNAL MEDICINE

## 2025-02-10 RX ORDER — LIDOCAINE HYDROCHLORIDE 20 MG/ML
INJECTION, SOLUTION EPIDURAL; INFILTRATION; INTRACAUDAL; PERINEURAL
Status: DISCONTINUED | OUTPATIENT
Start: 2025-02-10 | End: 2025-02-10 | Stop reason: SDUPTHER

## 2025-02-10 RX ORDER — SODIUM CHLORIDE 9 MG/ML
INJECTION, SOLUTION INTRAVENOUS PRN
Status: DISCONTINUED | OUTPATIENT
Start: 2025-02-10 | End: 2025-02-10 | Stop reason: HOSPADM

## 2025-02-10 RX ORDER — FENTANYL CITRATE 50 UG/ML
25 INJECTION, SOLUTION INTRAMUSCULAR; INTRAVENOUS AS NEEDED
Status: DISCONTINUED | OUTPATIENT
Start: 2025-02-10 | End: 2025-02-10 | Stop reason: HOSPADM

## 2025-02-10 RX ORDER — GLYCOPYRROLATE 0.2 MG/ML
INJECTION INTRAMUSCULAR; INTRAVENOUS
Status: DISCONTINUED | OUTPATIENT
Start: 2025-02-10 | End: 2025-02-10 | Stop reason: SDUPTHER

## 2025-02-10 RX ORDER — SODIUM CHLORIDE 0.9 % (FLUSH) 0.9 %
5-40 SYRINGE (ML) INJECTION PRN
Status: DISCONTINUED | OUTPATIENT
Start: 2025-02-10 | End: 2025-02-10 | Stop reason: HOSPADM

## 2025-02-10 RX ORDER — ONDANSETRON 2 MG/ML
2 INJECTION INTRAMUSCULAR; INTRAVENOUS AS NEEDED
Status: DISCONTINUED | OUTPATIENT
Start: 2025-02-10 | End: 2025-02-10 | Stop reason: HOSPADM

## 2025-02-10 RX ORDER — PROPOFOL 10 MG/ML
INJECTION, EMULSION INTRAVENOUS
Status: DISCONTINUED | OUTPATIENT
Start: 2025-02-10 | End: 2025-02-10 | Stop reason: SDUPTHER

## 2025-02-10 RX ORDER — SODIUM CHLORIDE 0.9 % (FLUSH) 0.9 %
5-40 SYRINGE (ML) INJECTION EVERY 12 HOURS SCHEDULED
Status: DISCONTINUED | OUTPATIENT
Start: 2025-02-10 | End: 2025-02-10 | Stop reason: HOSPADM

## 2025-02-10 RX ADMIN — PROPOFOL 50 MG: 10 INJECTION, EMULSION INTRAVENOUS at 11:01

## 2025-02-10 RX ADMIN — LIDOCAINE HYDROCHLORIDE 20 MG: 20 INJECTION, SOLUTION EPIDURAL; INFILTRATION; INTRACAUDAL; PERINEURAL at 10:59

## 2025-02-10 RX ADMIN — PROPOFOL 100 MG: 10 INJECTION, EMULSION INTRAVENOUS at 10:59

## 2025-02-10 RX ADMIN — GLYCOPYRROLATE 0.2 MG: 0.2 INJECTION, SOLUTION INTRAMUSCULAR; INTRAVENOUS at 10:59

## 2025-02-10 RX ADMIN — SODIUM CHLORIDE: 9 INJECTION, SOLUTION INTRAVENOUS at 10:41

## 2025-02-10 ASSESSMENT — PAIN - FUNCTIONAL ASSESSMENT: PAIN_FUNCTIONAL_ASSESSMENT: 0-10

## 2025-02-10 NOTE — ANESTHESIA POSTPROCEDURE EVALUATION
Department of Anesthesiology  Postprocedure Note    Patient: Frankie Kauffman  MRN: [id number]  YOB: 1965  Date of evaluation: 2/10/2025    Procedure Summary       Date: 02/10/25 Room / Location: [facility] ENDO 01 / [facility] ENDOSCOPY    Anesthesia Start: 1054 Anesthesia Stop: 1112    Procedure: ESOPHAGOGASTRODUODENOSCOPY (Upper GI Region) Diagnosis:       Chronic passive hepatic congestion      (Chronic passive hepatic congestion [K76.1])    Surgeons: Dwight Palacios MD Responsible Provider: Alfredito Oliver MD    Anesthesia Type: General ASA Status: 3            Anesthesia Type: General    Jean Phase I: Jean Score: 10    Jean Phase II: Jean Score: 10    Anesthesia Post Evaluation    Patient location during evaluation: PACU  Patient participation: complete - patient participated  Level of consciousness: awake and alert  Airway patency: patent  Nausea & Vomiting: no nausea and no vomiting  Cardiovascular status: blood pressure returned to baseline  Respiratory status: acceptable  Hydration status: euvolemic  Pain management: adequate    No notable events documented.

## 2025-02-10 NOTE — H&P
[facility]     Dwight Palacios MD, FACP, MACG, FAASLD   MD Sharmila Erazo PA-C April S Ashworth, [facility]   Dejah Garcia, [facility]   Alexa Zuluaga, FNP-C  Efe Amin, FN-C   [place], [facility]   at [facility]   [address], [address]   [place]  [address] [phone]   FAX: [phone]  [facility]   [address], [address]   [place]  [address] [phone]   FAX: [phone]       PRE-PROCEDURE NOTE - EGD    H and P from last office visit reviewed.    Allergies reviewed.  Out-patient medicaton list reviewed.    Patient Active Problem List   Diagnosis    H/O acute myocardial infarction    Hepatitis C virus infection cured after antiviral drug therapy    S/P angioplasty with stent    S/P insertion of iliac artery stent    High cholesterol    H/O: CVA (cerebrovascular accident)    Hypoalbuminemia    Hypertension    Acute on chronic renal failure (HCC)    Chest pain, unspecified    CAD (coronary artery disease), native coronary artery    PVD (peripheral vascular disease) with claudication (HCC)    Cirrhosis (HCC)    Chronic passive hepatic congestion    Hypothyroidism    History of hepatitis B         No Known Allergies    No current facility-administered medications on file prior to encounter.     Current Outpatient Medications on File Prior to Encounter   Medication Sig Dispense Refill    rosuvastatin (CRESTOR) 5 MG tablet Take 1 tablet by mouth daily      sevelamer (RENVELA) 800 MG tablet TAKE 2 TABLETS BY MOUTH THREE TIMES A DAY WITH MEALS AND TAKE 1 TABLET TWICE A DAY WITH SNACKS.      albuterol sulfate HFA (PROVENTIL;VENTOLIN;PROAIR) 108 (90 Base) MCG/ACT inhaler INHALE 2 PUFFS BY MOUTH EVERY 6 HOURS AS NEEDED FOR WHEEZING      amLODIPine (NORVASC) 10 MG

## 2025-02-10 NOTE — ANESTHESIA PRE PROCEDURE
Department of Anesthesiology  Preprocedure Note       Name:  Frankie Kauffman   Age:  59 y.o.  :  1965                                          MRN:  [id number]         Date:  2/10/2025      Surgeon: Surgeon(s):  Dwight Palacios MD    Procedure: Procedure(s):  ESOPHAGOGASTRODUODENOSCOPY    Medications prior to admission:   Prior to Admission medications    Medication Sig Start Date End Date Taking? Authorizing Provider   rosuvastatin (CRESTOR) 5 MG tablet Take 1 tablet by mouth daily 24  Yes Provider, MD Cassidy   sevelamer (RENVELA) 800 MG tablet TAKE 2 TABLETS BY MOUTH THREE TIMES A DAY WITH MEALS AND TAKE 1 TABLET TWICE A DAY WITH SNACKS. 23  Yes Provider, MD Cassidy   albuterol sulfate HFA (PROVENTIL;VENTOLIN;PROAIR) 108 (90 Base) MCG/ACT inhaler INHALE 2 PUFFS BY MOUTH EVERY 6 HOURS AS NEEDED FOR WHEEZING 22  Yes Automatic Reconciliation, Ar   amLODIPine (NORVASC) 10 MG tablet Take 1 tablet by mouth daily 20  Yes Automatic Reconciliation, Ar   aspirin 81 MG chewable tablet Take 1 tablet by mouth daily   Yes Automatic Reconciliation, Ar   carvedilol (COREG) 25 MG tablet Take 1 tablet by mouth 2 times daily (with meals)   Yes Automatic Reconciliation, Ar   clopidogrel (PLAVIX) 75 MG tablet Take 1 tablet by mouth daily 3/24/15  Yes Automatic Reconciliation, Ar   isosorbide mononitrate (IMDUR) 30 MG extended release tablet Take 1 tablet by mouth daily 20  Yes Automatic Reconciliation, Ar   levothyroxine (SYNTHROID) 25 MCG tablet Take 1 tablet by mouth every morning (before breakfast)   Yes Automatic Reconciliation, Ar   acetaminophen (TYLENOL) 500 MG tablet Take 1 tablet by mouth every 6 hours as needed 23   Provider, MD Cassidy   ipratropium-albuterol (DUONEB) 0.5-2.5 (3) MG/3ML SOLN nebulizer solution Inhale 3 mLs into the lungs 2 times daily 22  Automatic Reconciliation, Ar   nitroGLYCERIN (NITROSTAT) 0.4 MG SL tablet Place 1 tablet under the

## 2025-02-10 NOTE — DISCHARGE INSTRUCTIONS
office at the phone number listed above to inquire about the results.    ENDOSCOPY FINDINGS:  Your endoscopy was normal.  No repeat EGD is needed.    Keep follow-up appointment with Derrek on 4/29/2025.    DISCHARGE SUMMARY from the Nurse:  The following personal items collected during your admission are returned to you:

## 2025-02-11 ENCOUNTER — HOSPITAL ENCOUNTER (OUTPATIENT)
Facility: HOSPITAL | Age: 60
End: 2025-02-11
Payer: MEDICARE

## 2025-02-11 ENCOUNTER — APPOINTMENT (OUTPATIENT)
Facility: HOSPITAL | Age: 60
End: 2025-02-11
Payer: MEDICARE

## 2025-02-11 ENCOUNTER — HOSPITAL ENCOUNTER (OUTPATIENT)
Facility: HOSPITAL | Age: 60
Discharge: HOME | End: 2025-02-11
Attending: INTERNAL MEDICINE | Admitting: INTERNAL MEDICINE
Payer: MEDICARE

## 2025-02-11 VITALS
TEMPERATURE: 97.4 F | WEIGHT: 155 LBS | RESPIRATION RATE: 20 BRPM | BODY MASS INDEX: 25.83 KG/M2 | SYSTOLIC BLOOD PRESSURE: 104 MMHG | DIASTOLIC BLOOD PRESSURE: 52 MMHG | HEIGHT: 65 IN | OXYGEN SATURATION: 100 % | HEART RATE: 50 BPM

## 2025-02-11 DIAGNOSIS — K76.1 CHRONIC PASSIVE HEPATIC CONGESTION: ICD-10-CM

## 2025-02-11 DIAGNOSIS — K70.31 ALCOHOLIC CIRRHOSIS OF LIVER WITH ASCITES (HCC): ICD-10-CM

## 2025-02-11 LAB
APPEARANCE FLD: ABNORMAL
COLOR FLD: YELLOW
EOSINOPHIL NFR FLD MANUAL: 1 %
LYMPHOCYTES NFR FLD: 26 %
MACROPHAGES NFR FLD: 71 %
MONOCYTES NFR FLD: 0 %
NEUTROPHILS NFR FLD: 2 %
NEUTS BAND # FLD: 0 %
NUC CELL # FLD: 540 /CU MM
RBC # FLD: 3000 /CU MM
SPECIMEN SOURCE FLD: ABNORMAL

## 2025-02-11 PROCEDURE — P9047 ALBUMIN (HUMAN), 25%, 50ML: HCPCS | Performed by: NURSE PRACTITIONER

## 2025-02-11 PROCEDURE — 76705 ECHO EXAM OF ABDOMEN: CPT

## 2025-02-11 PROCEDURE — 7100000010 HC PHASE II RECOVERY - FIRST 15 MIN

## 2025-02-11 PROCEDURE — 89051 BODY FLUID CELL COUNT: CPT

## 2025-02-11 PROCEDURE — 6360000002 HC RX W HCPCS

## 2025-02-11 PROCEDURE — 6360000002 HC RX W HCPCS: Performed by: NURSE PRACTITIONER

## 2025-02-11 PROCEDURE — 2709999900 US GUIDED PARACENTESIS

## 2025-02-11 RX ORDER — ALBUMIN (HUMAN) 12.5 G/50ML
25 SOLUTION INTRAVENOUS
Status: DISCONTINUED | OUTPATIENT
Start: 2025-02-11 | End: 2025-02-11 | Stop reason: HOSPADM

## 2025-02-11 RX ORDER — ALBUMIN (HUMAN) 12.5 G/50ML
25 SOLUTION INTRAVENOUS ONCE
Status: COMPLETED | OUTPATIENT
Start: 2025-02-11 | End: 2025-02-11

## 2025-02-11 RX ORDER — LIDOCAINE HYDROCHLORIDE 10 MG/ML
10 INJECTION, SOLUTION INFILTRATION; PERINEURAL ONCE
Status: COMPLETED | OUTPATIENT
Start: 2025-02-11 | End: 2025-02-11

## 2025-02-11 RX ADMIN — ALBUMIN (HUMAN) 25 G: 0.25 INJECTION, SOLUTION INTRAVENOUS at 10:19

## 2025-02-11 RX ADMIN — LIDOCAINE HYDROCHLORIDE ANHYDROUS 7 ML: 10 INJECTION, SOLUTION INFILTRATION at 11:12

## 2025-02-11 NOTE — PROGRESS NOTES
Ultrasound guided RLQ paracentesis was performed.  Pt tolerated procedure well, no signs of distress.  14 mL of ascites fluid was collected and sent to lab for cell count and diff.  3,000 mL of fluid drained.  Pt returned to Care Unit in stable condition.

## 2025-02-11 NOTE — DISCHARGE INSTRUCTIONS
ACTIVITY.   Please rest on your affected side (the side which fluid was removed). This will help seal the puncture site.   Resume light activities tomorrow as tolerated.     DIET   Resume your regular diet.     MEDICATIONS   Resume your regular medications.   DO NOT TAKE ANY BLOOD THINNING MEDICATIONS (Aspirin, Coumadin,Heparin, or any aspirin containing products) UNTIL YOUR DOCTOR TELLS YOU TO.   Take Tylenol or Extra-Strength Tylenol for pain if needed.     WOUND CARE   A Band-Aid will cover the site where the fluid was removed.   The bandage may be removed after 24 hours.     Check your puncture site for any redness, swelling or bleeding.      CALL YOUR DOCTOR OR THE EMERGENCY DEPARTMENT ([phone]) FOR ANY OF THE FOLLOWING:    *Any bleeding, swelling or drainage from the puncture site.  *Fever greater that 101 degrees.  *Pain that is not controlled with Tylenol or Extra-Strength Tylenol.  *Chest pain, shortness of breath, rapid heart rate, or pain with taking a breath.        FOLLOW UP APPOINTMENT:   Please call your physician in 2-3 days for a follow up appointment or results.   THE RADIOLOGY DEPARTMENT CANNOT GIVE YOU THESE RESULTS  Patient armband removed and shredded          Radiology Nurse: [phone]  Radiology Department: [phone]

## 2025-02-11 NOTE — PROGRESS NOTES
Pt. Is ready for procedure.  1055 Pt. To U/S via stretcher.  1210 Pt. Returned from IR , awake and alert. No c/o pain. Band aid dry and intact right lower abdomen.   1220 Pt. D/c'd to home via w/c in c/o friend. No c/o pain. Band aid dry and intact right lower abdomen. Pt. In stable condition.

## 2025-04-06 NOTE — DISCHARGE INSTRUCTIONS
Learning About Paracentesis  What is paracentesis?  Paracentesis (say \"nico-ou-owg-MIGUEL-ann-marie\") is a procedure that removes fluid from the belly. The buildup of fluid may be caused by infection, inflammation, an injury, or other problems.  Swelling from too much fluid may cause pain or trouble breathing. The doctor will remove the extra fluid with a needle attached to a tube.  Why is paracentesis done?  Paracentesis may be done to:  Find the cause of fluid buildup in the belly.  Diagnose an infection in the peritoneal fluid.  Check for certain types of cancer.  Remove a large amount of fluid that is causing pain or trouble breathing or that is affecting how the kidneys or the intestines (bowel) are working.  How is the procedure done?  You will empty your bladder before the procedure.  Your doctor or nurse will clean the area of your belly where the needle will go in. Then sterile towels will be put around the area.  You may get a shot of numbing medicine in the skin of your belly. Then your doctor will gently insert a needle where the fluid is. Your doctor may attach a tube (catheter) to the site to help collect the fluid.  After the fluid has drained, your doctor will take out the needle or catheter and put a bandage on the site.  How long does a paracentesis take?  The procedure may take from a few minutes to 30 minutes or more.  What happens after the test?  You can do your normal activities after the procedure, unless your doctor tells you not to.  After the procedure, you may have some clear fluid draining from the site, especially if a large amount of fluid was taken out. There will be less drainage in 1 to 2 days. Ask your doctor how much drainage to expect.  A small gauze pad and bandage may be needed. You may need to change the bandage.  If your doctor thinks that testing the fluid can help find out the cause of a problem, your doctor will send it to a lab.  If fluid builds up in your belly again, your 
no

## 2025-04-29 ENCOUNTER — HOSPITAL ENCOUNTER (OUTPATIENT)
Facility: HOSPITAL | Age: 60
Setting detail: SPECIMEN
Discharge: HOME | End: 2025-05-02

## 2025-04-29 ENCOUNTER — OFFICE VISIT (OUTPATIENT)
Age: 60
End: 2025-04-29
Payer: MEDICARE

## 2025-04-29 VITALS
DIASTOLIC BLOOD PRESSURE: 90 MMHG | TEMPERATURE: 98 F | BODY MASS INDEX: 24.32 KG/M2 | OXYGEN SATURATION: 100 % | WEIGHT: 146 LBS | SYSTOLIC BLOOD PRESSURE: 122 MMHG | HEIGHT: 65 IN | HEART RATE: 77 BPM

## 2025-04-29 DIAGNOSIS — K76.1 CHRONIC PASSIVE HEPATIC CONGESTION: ICD-10-CM

## 2025-04-29 DIAGNOSIS — K76.1 CHRONIC PASSIVE HEPATIC CONGESTION: Primary | ICD-10-CM

## 2025-04-29 LAB — LABCORP SPECIMEN COLLECTION: NORMAL

## 2025-04-29 PROCEDURE — 99001 SPECIMEN HANDLING PT-LAB: CPT

## 2025-04-29 PROCEDURE — 99214 OFFICE O/P EST MOD 30 MIN: CPT | Performed by: NURSE PRACTITIONER

## 2025-04-29 PROCEDURE — 3074F SYST BP LT 130 MM HG: CPT | Performed by: NURSE PRACTITIONER

## 2025-04-29 PROCEDURE — 3080F DIAST BP >= 90 MM HG: CPT | Performed by: NURSE PRACTITIONER

## 2025-04-29 NOTE — PROGRESS NOTES
CVA.   Eyes: Sclera anicteric.   ENT: No oral lesions.  Thyroid normal.  Nodes: No adenopathy.   Skin: No spider angiomata.  No jaundice.  No palmar erythema.  Respiratory: Lungs clear to auscultation.   Cardiovascular: Regular heart rate.  No murmurs.  No JVD.  Abdomen: Soft non-tender.  Liver size normal to percussion/palpation.  Spleen not palpable. Obvious ascites.  Extremities: No edema.  No muscle wasting.  No gross arthritic changes.  Neurologic: Alert and oriented.  Cranial nerves grossly intact.  No asterixis.    LABORATORY STUDIES:   Latest Ref Rng 10/15/2024 12/19/2024 1/28/2025 4/29/2025   [facility] - Routine Labs        WBC 3.4 - 10.8 x10E3/uL 4.9  3.6 (L)  4.5 (L)  3.8    ANC 1.4 - 7.0 x10E3/uL 3.5   2.98  2.4    HGB 13.0 - 17.7 g/dL 10.1 (L)  10.4 (L)  11.7 (L)  10.2 (L)     - 450 x10E3/uL 169  134 (L)  128 (L)  119 (L)    INR 0.9 - 1.2  1.2  1.3 (H)  1.2 (H)  1.2    AST 0 - 40 IU/L 17   16  18    ALT 0 - 44 IU/L 7   12 (L)  11    Alk Phos 44 - 121 IU/L 113   116  98    Bili, Total 0.0 - 1.2 mg/dL 0.4   0.8  0.5    Bili, Direct 0.00 - 0.40 mg/dL 0.26   0.4 (H)  0.27    Albumin 3.8 - 4.9 g/dL 4.4   4.0  4.5    BUN 8 - 27 mg/dL 39 (H)   34 (H)  29 (H)    Creat 0.76 - 1.27 mg/dL 5.66 (H)   6.89 (H)  6.40 (H)    Na 134 - 144 mmol/L 138   136  140    K 3.5 - 5.2 mmol/L 5.1   4.4  4.2    Cl 96 - 106 mmol/L 94 (L)   98 (L)  98    CO2 20 - 29 mmol/L 24   34 (H)  26    Glucose 70 - 99 mg/dL 87   94  86    Magnesium 1.6 - 2.6 mg/dL       Ammonia 11 - 32 UMOL/L 29 (L)   27         Latest Ref Rng 10/15/2024 12/19/2024 1/28/2025   [facility] - Cancer Screening       AFP 0.0 - 8.4 ng/mL 1.0   1.2    AFP-L3% 0.0 - 9.9 % Comment   Comment      SEROLOGIES:  Serologies Latest Ref Rng & Units 7/6/2020   Hep A Ab, Total Negative Positive (A)   Hep B Surface Ag Negative Negative   Hep B Core Ab, Total Negative Negative   Hep B Surface AB QL  Reactive   Hep C Genotype  1a   HCV RT-PCR, Quant IU/mL 598,000   CHRIS, IFA       LIVER

## 2025-04-30 LAB
ALBUMIN SERPL-MCNC: 4.5 G/DL (ref 3.8–4.9)
ALP SERPL-CCNC: 98 IU/L (ref 44–121)
ALT SERPL-CCNC: 11 IU/L (ref 0–44)
AST SERPL-CCNC: 18 IU/L (ref 0–40)
BASOPHILS # BLD AUTO: 0 X10E3/UL (ref 0–0.2)
BASOPHILS NFR BLD AUTO: 1 %
BILIRUB DIRECT SERPL-MCNC: 0.27 MG/DL (ref 0–0.4)
BILIRUB SERPL-MCNC: 0.5 MG/DL (ref 0–1.2)
BUN SERPL-MCNC: 29 MG/DL (ref 8–27)
BUN/CREAT SERPL: 5 (ref 10–24)
CALCIUM SERPL-MCNC: 9.8 MG/DL (ref 8.6–10.2)
CHLORIDE SERPL-SCNC: 98 MMOL/L (ref 96–106)
CO2 SERPL-SCNC: 26 MMOL/L (ref 20–29)
CREAT SERPL-MCNC: 6.4 MG/DL (ref 0.76–1.27)
EGFRCR SERPLBLD CKD-EPI 2021: 9 ML/MIN/1.73
EOSINOPHIL # BLD AUTO: 0.1 X10E3/UL (ref 0–0.4)
EOSINOPHIL NFR BLD AUTO: 4 %
ERYTHROCYTE [DISTWIDTH] IN BLOOD BY AUTOMATED COUNT: 16.4 % (ref 11.6–15.4)
GLUCOSE SERPL-MCNC: 86 MG/DL (ref 70–99)
HCT VFR BLD AUTO: 32.1 % (ref 37.5–51)
HGB BLD-MCNC: 10.2 G/DL (ref 13–17.7)
IMM GRANULOCYTES # BLD AUTO: 0 X10E3/UL (ref 0–0.1)
IMM GRANULOCYTES NFR BLD AUTO: 0 %
INR PPP: 1.2 (ref 0.9–1.2)
LYMPHOCYTES # BLD AUTO: 0.8 X10E3/UL (ref 0.7–3.1)
LYMPHOCYTES NFR BLD AUTO: 22 %
MCH RBC QN AUTO: 25 PG (ref 26.6–33)
MCHC RBC AUTO-ENTMCNC: 31.8 G/DL (ref 31.5–35.7)
MCV RBC AUTO: 79 FL (ref 79–97)
MONOCYTES # BLD AUTO: 0.4 X10E3/UL (ref 0.1–0.9)
MONOCYTES NFR BLD AUTO: 11 %
MORPHOLOGY BLD-IMP: ABNORMAL
NEUTROPHILS # BLD AUTO: 2.4 X10E3/UL (ref 1.4–7)
NEUTROPHILS NFR BLD AUTO: 62 %
PLATELET # BLD AUTO: 119 X10E3/UL (ref 150–450)
POTASSIUM SERPL-SCNC: 4.2 MMOL/L (ref 3.5–5.2)
PROT SERPL-MCNC: 7.6 G/DL (ref 6–8.5)
PROTHROMBIN TIME: 13.2 SEC (ref 9.1–12)
RBC # BLD AUTO: 4.08 X10E6/UL (ref 4.14–5.8)
SODIUM SERPL-SCNC: 140 MMOL/L (ref 134–144)
WBC # BLD AUTO: 3.8 X10E3/UL (ref 3.4–10.8)

## 2025-05-06 RX ORDER — ALBUMIN (HUMAN) 12.5 G/50ML
25 SOLUTION INTRAVENOUS ONCE
OUTPATIENT
Start: 2025-05-06 | End: 2025-05-06

## 2025-05-06 RX ORDER — ALBUMIN (HUMAN) 12.5 G/50ML
25 SOLUTION INTRAVENOUS AS NEEDED
OUTPATIENT
Start: 2025-05-06

## 2025-05-13 ENCOUNTER — HOSPITAL ENCOUNTER (OUTPATIENT)
Facility: HOSPITAL | Age: 60
Discharge: HOME | End: 2025-05-13
Attending: INTERNAL MEDICINE | Admitting: INTERNAL MEDICINE
Payer: MEDICARE

## 2025-05-13 VITALS
WEIGHT: 149.6 LBS | HEART RATE: 53 BPM | BODY MASS INDEX: 24.89 KG/M2 | SYSTOLIC BLOOD PRESSURE: 118 MMHG | TEMPERATURE: 97.7 F | OXYGEN SATURATION: 97 % | DIASTOLIC BLOOD PRESSURE: 61 MMHG | RESPIRATION RATE: 16 BRPM

## 2025-05-13 DIAGNOSIS — K76.1 CHRONIC PASSIVE HEPATIC CONGESTION: ICD-10-CM

## 2025-05-13 LAB
AFP L3 MFR SERPL: NORMAL % (ref 0–9.9)
AFP SERPL-MCNC: 1.2 NG/ML (ref 0–8.4)
APPEARANCE FLD: CLEAR
COLOR FLD: YELLOW
EOSINOPHIL NFR FLD MANUAL: 1 %
LYMPHOCYTES NFR FLD: 37 %
MACROPHAGES NFR FLD: 62 %
MONOCYTES NFR FLD: 0 %
NEUTROPHILS NFR FLD: 0 %
NEUTS BAND # FLD: 0 %
NUC CELL # FLD: 434 /CU MM
RBC # FLD: 5000 /CU MM
SPECIMEN SOURCE FLD: ABNORMAL

## 2025-05-13 PROCEDURE — 6360000002 HC RX W HCPCS: Performed by: INTERNAL MEDICINE

## 2025-05-13 PROCEDURE — 89051 BODY FLUID CELL COUNT: CPT

## 2025-05-13 PROCEDURE — 2709999900 US GUIDED PARACENTESIS

## 2025-05-13 PROCEDURE — 6360000002 HC RX W HCPCS

## 2025-05-13 PROCEDURE — P9047 ALBUMIN (HUMAN), 25%, 50ML: HCPCS

## 2025-05-13 RX ORDER — ALBUMIN (HUMAN) 12.5 G/50ML
SOLUTION INTRAVENOUS
Status: COMPLETED
Start: 2025-05-13 | End: 2025-05-13

## 2025-05-13 RX ORDER — LIDOCAINE HYDROCHLORIDE 10 MG/ML
10 INJECTION, SOLUTION EPIDURAL; INFILTRATION; INTRACAUDAL; PERINEURAL ONCE
Status: COMPLETED | OUTPATIENT
Start: 2025-05-13 | End: 2025-05-13

## 2025-05-13 RX ADMIN — ALBUMIN (HUMAN) 25 G: 0.25 INJECTION, SOLUTION INTRAVENOUS at 08:45

## 2025-05-13 RX ADMIN — LIDOCAINE HYDROCHLORIDE ANHYDROUS 10 ML: 10 INJECTION, SOLUTION INFILTRATION at 09:25

## 2025-05-13 ASSESSMENT — PAIN - FUNCTIONAL ASSESSMENT: PAIN_FUNCTIONAL_ASSESSMENT: NONE - DENIES PAIN

## 2025-05-13 NOTE — PROGRESS NOTES
Pt returned to care unit post paracentesis,  dressing to right abd is clean dry and intact.  Discharge inst verbally reviewed with pt.  Pt verbalized understanding,  arm bands removed and shredded, pt denies any pain or distress at time of discharge

## 2025-05-13 NOTE — PROCEDURES
PROCEDURE NOTE  Date: 5/13/2025   Name: Frankie Kauffman  YOB: 1965    Procedures  Ultrasound Guided Right Lower Quad Paracentesis was preformed. 1.8 Liters was drained.  14ccs sent to lab for testing.

## (undated) DEVICE — MOUTHPIECE ENDOSCP L CTRL OPN AND SIDE PORTS DISP

## (undated) DEVICE — KENDALL RADIOLUCENT FOAM MONITORING ELECTRODE RECTANGULAR SHAPE: Brand: KENDALL

## (undated) DEVICE — ENDO CARRY-ON PROCEDURE KIT INCLUDES ENZYMATIC SPONGE, GAUZE, BIOHAZARD LABEL, TRAY, LUBRICANT, DIRTY SCOPE LABEL, WATER LABEL, TRAY, DRAWSTRING PAD, AND DEFENDO 4-PIECE KIT.: Brand: ENDO CARRY-ON PROCEDURE KIT

## (undated) DEVICE — TUBING SUCT L12FT DIA0.25IN CLR W/ MAXI-GRIP AND M/M CONN